# Patient Record
Sex: MALE | Race: WHITE | NOT HISPANIC OR LATINO | Employment: OTHER | URBAN - METROPOLITAN AREA
[De-identification: names, ages, dates, MRNs, and addresses within clinical notes are randomized per-mention and may not be internally consistent; named-entity substitution may affect disease eponyms.]

---

## 2017-05-01 ENCOUNTER — OFFICE VISIT (OUTPATIENT)
Dept: OPHTHALMOLOGY | Facility: CLINIC | Age: 82
End: 2017-05-01
Payer: MEDICARE

## 2017-05-01 DIAGNOSIS — H52.7 REFRACTIVE ERROR: ICD-10-CM

## 2017-05-01 DIAGNOSIS — Z96.1 PSEUDOPHAKIA: ICD-10-CM

## 2017-05-01 DIAGNOSIS — H43.23 ASTEROID HYALOSIS, BILATERAL: ICD-10-CM

## 2017-05-01 DIAGNOSIS — H35.30 AMD (AGE-RELATED MACULAR DEGENERATION), BILATERAL: Primary | ICD-10-CM

## 2017-05-01 DIAGNOSIS — I10 ESSENTIAL HYPERTENSION: ICD-10-CM

## 2017-05-01 PROCEDURE — 99999 PR PBB SHADOW E&M-EST. PATIENT-LVL II: CPT | Mod: PBBFAC,,, | Performed by: OPHTHALMOLOGY

## 2017-05-01 PROCEDURE — 92014 COMPRE OPH EXAM EST PT 1/>: CPT | Mod: S$GLB,,, | Performed by: OPHTHALMOLOGY

## 2017-05-01 NOTE — PROGRESS NOTES
Subjective:       Patient ID: Tremayne Fortune is a 82 y.o. male.    Chief Complaint: Follow-up    HPI  Review of Systems    Objective:      Physical Exam    Assessment:       1. AMD (age-related macular degeneration), bilateral    2. Asteroid hyalosis, bilateral    3. Essential hypertension    4. Refractive error    5. Pseudophakia        Plan:       Dry AMD OU-Stable.  Asteroid hyalosis OU-Stable.  HTN-No retinopathy OU.  RE-Pt does not want MRx. Doing well with readers.        AREDS/AG.  Control HTN.  Readers.  RTC 1 yr.

## 2017-05-01 NOTE — MR AVS SNAPSHOT
[facility]  [address]  [place] [address]  Phone: [phone]  Fax: [phone]                  Tremayne Fortune   2017 11:00 AM   Office Visit    Description:  Male : 1935   Provider:  Nato Delgado MD   Department:  [facility]           Reason for Visit     Follow-up           Diagnoses this Visit        Comments    AMD (age-related macular degeneration), bilateral    -  Primary     Asteroid hyalosis, bilateral         Essential hypertension         Refractive error         Pseudophakia                To Do List           Goals (5 Years of Data)     None      Follow-Up and Disposition     Return in about 1 year (around 2018) for 1 yr F/U..      [facility]     [facility] Care Line -  Assistance  Unless otherwise directed by your provider, please contact [facility], our nurse care line that is available for  assistance.     Registered nurses in the [facility] provide: appointment scheduling, clinical advisement, health education, and other advisory services.  Call: [phone] (toll free)               Medications           Message regarding Medications     Verify the changes and/or additions to your medication regime listed below are the same as discussed with your clinician today.  If any of these changes or additions are incorrect, please notify your healthcare provider.             Verify that the below list of medications is an accurate representation of the medications you are currently taking.  If none reported, the list may be blank. If incorrect, please contact your healthcare provider. Carry this list with you in case of emergency.           Current Medications     colestipol (COLESTID) 1 gram Tab     lisinopril (PRINIVIL,ZESTRIL) 20 MG tablet     metoprolol succinate (TOPROL-XL) 50 MG 24 hr tablet     rosuvastatin (CRESTOR) 10 MG tablet Take 10 mg by mouth once daily.    isosorbide mononitrate  (IMDUR) 30 MG 24 hr tablet            Clinical Reference Information           Allergies as of 5/1/2017     Shellfish Containing Products      Immunizations Administered on Date of Encounter - 5/1/2017     None      [facility] Sign-Up     Activating your [facility] account is as easy as 1-2-3!     1) Visit [email or web address], select Sign Up Now, enter this activation code and your date of birth, then select Next.  [email or web address]  Expires: 6/15/2017  4:40 PM      2) Create a username and password to use when you visit [facility] in the future and select a security question in case you lose your password and select Next.    3) Enter your e-mail address and click Sign Up!    Additional Information  If you have questions, please e-mail [email or web address] or call [phone] to talk to our [facility] staff. Remember, [facility] is NOT to be used for urgent needs. For medical emergencies, dial 911.         Language Assistance Services     ATTENTION: Language assistance services are available, free of charge. Please call [phone].      ATENCIÓN: Si habla español, tiene a hogue disposición servicios gratuitos de asistencia lingüística. Llame al [phone].     CHÚ Ý: N?u b?n nói Ti?ng Vi?t, có các d?ch v? h? tr? ngôn ng? mi?n phí dành cho b?n. G?i s? [phone].         [facility] complies with applicable Federal civil rights laws and does not discriminate on the basis of race, color, national origin, age, disability, or sex.

## 2018-05-10 ENCOUNTER — OFFICE VISIT (OUTPATIENT)
Dept: OPHTHALMOLOGY | Facility: CLINIC | Age: 83
End: 2018-05-10
Payer: MEDICARE

## 2018-05-10 DIAGNOSIS — H43.23 ASTEROID HYALOSIS, BILATERAL: ICD-10-CM

## 2018-05-10 DIAGNOSIS — H26.491 PCO (POSTERIOR CAPSULAR OPACIFICATION), RIGHT: Primary | ICD-10-CM

## 2018-05-10 DIAGNOSIS — H52.7 REFRACTIVE ERROR: ICD-10-CM

## 2018-05-10 DIAGNOSIS — I10 ESSENTIAL HYPERTENSION: ICD-10-CM

## 2018-05-10 DIAGNOSIS — Z96.1 PSEUDOPHAKIA: ICD-10-CM

## 2018-05-10 DIAGNOSIS — H35.30 AMD (AGE-RELATED MACULAR DEGENERATION), BILATERAL: ICD-10-CM

## 2018-05-10 PROCEDURE — 92014 COMPRE OPH EXAM EST PT 1/>: CPT | Mod: S$GLB,,, | Performed by: OPHTHALMOLOGY

## 2018-05-10 PROCEDURE — 99999 PR PBB SHADOW E&M-EST. PATIENT-LVL II: CPT | Mod: PBBFAC,,, | Performed by: OPHTHALMOLOGY

## 2018-05-10 RX ORDER — AMLODIPINE BESYLATE 5 MG/1
5 TABLET ORAL NIGHTLY
COMMUNITY

## 2018-05-10 RX ORDER — ASPIRIN 325 MG
325 TABLET, DELAYED RELEASE (ENTERIC COATED) ORAL DAILY
COMMUNITY

## 2018-05-10 RX ORDER — AMOXICILLIN 500 MG
1 CAPSULE ORAL 2 TIMES DAILY
COMMUNITY

## 2018-05-10 RX ORDER — ROSUVASTATIN CALCIUM 5 MG/1
5 TABLET, COATED ORAL DAILY
Refills: 3 | COMMUNITY
Start: 2018-03-22 | End: 2018-12-01

## 2018-05-10 RX ORDER — LOSARTAN POTASSIUM 50 MG/1
50 TABLET ORAL DAILY
Refills: 1 | COMMUNITY
Start: 2018-02-26

## 2018-05-10 NOTE — PROGRESS NOTES
Subjective:       Patient ID: Tremayne Fortune is a 83 y.o. male.    Chief Complaint: Macular Degeneration (AMD, bilateral )    HPI     Macular Degeneration    Additional comments: AMD, bilateral            Comments   AMD, bilateral   Denies eye pain and flashes. Increase floaters left eye more than right   eye.   No itching, burning or tearing.   Crusting in the corner of eyes left eye more than right eye throughout the   day. Notice a few months, pt states that he no longer have this problem.   Pt states that vision is getting a little bit worst.   Do have trouble with glare.     Meds: No gtt        Last edited by BERNA Espinoza on 5/10/2018 10:59 AM. (History)             Assessment:       1. PCO (posterior capsular opacification), right    2. AMD (age-related macular degeneration), bilateral    3. Asteroid hyalosis, bilateral    4. Essential hypertension    5. Refractive error    6. Pseudophakia        Plan:       Early PCO OD- Not Visually Significant.  Dry AMD OU-Stable.  Asteroid hyalosis OU-Stable.  HTN-No retinopathy OU.  RE-Pt wants CRx.        AREDS/AG.  Control HTN.  Give CRx.  RTC 1 yr.

## 2018-12-01 ENCOUNTER — OFFICE VISIT (OUTPATIENT)
Dept: URGENT CARE | Facility: CLINIC | Age: 83
End: 2018-12-01
Payer: MEDICARE

## 2018-12-01 VITALS
DIASTOLIC BLOOD PRESSURE: 64 MMHG | WEIGHT: 150 LBS | TEMPERATURE: 101 F | SYSTOLIC BLOOD PRESSURE: 126 MMHG | HEART RATE: 87 BPM | BODY MASS INDEX: 23.85 KG/M2 | OXYGEN SATURATION: 98 %

## 2018-12-01 DIAGNOSIS — J00 ACUTE NASOPHARYNGITIS: ICD-10-CM

## 2018-12-01 DIAGNOSIS — R50.9 FEVER, UNSPECIFIED FEVER CAUSE: Primary | ICD-10-CM

## 2018-12-01 LAB
CTP QC/QA: YES
FLUAV AG NPH QL: NEGATIVE
FLUBV AG NPH QL: NEGATIVE

## 2018-12-01 PROCEDURE — 96372 THER/PROPH/DIAG INJ SC/IM: CPT | Mod: S$GLB,,, | Performed by: FAMILY MEDICINE

## 2018-12-01 PROCEDURE — 1101F PT FALLS ASSESS-DOCD LE1/YR: CPT | Mod: CPTII,S$GLB,, | Performed by: FAMILY MEDICINE

## 2018-12-01 PROCEDURE — 99214 OFFICE O/P EST MOD 30 MIN: CPT | Mod: 25,S$GLB,, | Performed by: FAMILY MEDICINE

## 2018-12-01 PROCEDURE — 3074F SYST BP LT 130 MM HG: CPT | Mod: CPTII,S$GLB,, | Performed by: FAMILY MEDICINE

## 2018-12-01 PROCEDURE — 87804 INFLUENZA ASSAY W/OPTIC: CPT | Mod: 59,QW,S$GLB, | Performed by: FAMILY MEDICINE

## 2018-12-01 PROCEDURE — 3078F DIAST BP <80 MM HG: CPT | Mod: CPTII,S$GLB,, | Performed by: FAMILY MEDICINE

## 2018-12-01 RX ORDER — BETAMETHASONE SODIUM PHOSPHATE AND BETAMETHASONE ACETATE 3; 3 MG/ML; MG/ML
6 INJECTION, SUSPENSION INTRA-ARTICULAR; INTRALESIONAL; INTRAMUSCULAR; SOFT TISSUE
Status: COMPLETED | OUTPATIENT
Start: 2018-12-01 | End: 2018-12-01

## 2018-12-01 RX ORDER — GUAIFENESIN/DEXTROMETHORPHAN 100-10MG/5
5 SYRUP ORAL NIGHTLY
Refills: 0 | COMMUNITY
Start: 2018-12-01 | End: 2018-12-11

## 2018-12-01 RX ADMIN — BETAMETHASONE SODIUM PHOSPHATE AND BETAMETHASONE ACETATE 6 MG: 3; 3 INJECTION, SUSPENSION INTRA-ARTICULAR; INTRALESIONAL; INTRAMUSCULAR; SOFT TISSUE at 04:12

## 2018-12-01 NOTE — PATIENT INSTRUCTIONS

## 2018-12-01 NOTE — PROGRESS NOTES
Subjective:       Patient ID: Tremayne Fortune is a 83 y.o. male.    Vitals:  weight is 68 kg (150 lb). His temperature is 100.8 °F (38.2 °C) (abnormal). His blood pressure is 126/64 and his pulse is 87. His oxygen saturation is 98%.     Chief Complaint: URI    URI    This is a new problem. The current episode started in the past 7 days. The problem has been gradually worsening. The maximum temperature recorded prior to his arrival was 100.4 - 100.9 F. The fever has been present for less than 1 day. Associated symptoms include congestion, coughing, rhinorrhea and a sore throat. Pertinent negatives include no ear pain, nausea, rash, sinus pain, vomiting or wheezing. Treatments tried: otc cough.     Just a couple of days of URI sx. Some cough but mostly nonproductive.  Nasal d/c is clear.      Constitution: Positive for fever. Negative for chills, sweating and fatigue.   HENT: Positive for congestion, sore throat and voice change. Negative for ear pain, sinus pain and sinus pressure.    Neck: Negative for painful lymph nodes.   Eyes: Negative for eye redness.   Respiratory: Positive for cough. Negative for chest tightness, sputum production, bloody sputum, COPD, shortness of breath, stridor, wheezing and asthma.    Gastrointestinal: Negative for nausea and vomiting.   Musculoskeletal: Negative for muscle ache.   Skin: Negative for rash.   Allergic/Immunologic: Negative for seasonal allergies and asthma.   Hematologic/Lymphatic: Negative for swollen lymph nodes.       Objective:      Physical Exam   Constitutional: He appears well-developed and well-nourished. No distress.   HENT:   Head: Normocephalic.   Right Ear: External ear normal.   Left Ear: External ear normal.   Nose: Nose normal.   Mouth/Throat: Oropharynx is clear and moist. No oropharyngeal exudate.   Eyes: Conjunctivae are normal.   Neck: No thyromegaly present.   Cardiovascular: Normal rate, regular rhythm and normal heart sounds.   Pulmonary/Chest: Effort  normal and breath sounds normal. No respiratory distress. He has no wheezes.   Lymphadenopathy:     He has no cervical adenopathy.   Neurological: He is alert. No cranial nerve deficit.   Skin: He is not diaphoretic.   Psychiatric: He has a normal mood and affect. His behavior is normal.       Assessment:       1. Fever, unspecified fever cause    2. Acute nasopharyngitis        Plan:         Fever, unspecified fever cause  -     POCT Influenza A/B    Acute nasopharyngitis  -     betamethasone acetate-betamethasone sodium phosphate injection 6 mg

## 2019-08-05 ENCOUNTER — OFFICE VISIT (OUTPATIENT)
Dept: OPHTHALMOLOGY | Facility: CLINIC | Age: 84
End: 2019-08-05
Payer: MEDICARE

## 2019-08-05 DIAGNOSIS — H35.3131 EARLY DRY STAGE NONEXUDATIVE AGE-RELATED MACULAR DEGENERATION OF BOTH EYES: ICD-10-CM

## 2019-08-05 DIAGNOSIS — Z96.1 PSEUDOPHAKIA: ICD-10-CM

## 2019-08-05 DIAGNOSIS — H26.491 PCO (POSTERIOR CAPSULAR OPACIFICATION), RIGHT: Primary | ICD-10-CM

## 2019-08-05 DIAGNOSIS — H52.7 REFRACTIVE ERROR: ICD-10-CM

## 2019-08-05 DIAGNOSIS — I10 ESSENTIAL HYPERTENSION: ICD-10-CM

## 2019-08-05 DIAGNOSIS — H43.23 ASTEROID HYALOSIS, BILATERAL: ICD-10-CM

## 2019-08-05 PROCEDURE — 92014 PR EYE EXAM, EST PATIENT,COMPREHESV: ICD-10-PCS | Mod: S$GLB,,, | Performed by: OPHTHALMOLOGY

## 2019-08-05 PROCEDURE — 99999 PR PBB SHADOW E&M-EST. PATIENT-LVL II: CPT | Mod: PBBFAC,,, | Performed by: OPHTHALMOLOGY

## 2019-08-05 PROCEDURE — 99499 UNLISTED E&M SERVICE: CPT | Mod: S$GLB,,, | Performed by: OPHTHALMOLOGY

## 2019-08-05 PROCEDURE — 99499 RISK ADDL DX/OHS AUDIT: ICD-10-PCS | Mod: S$GLB,,, | Performed by: OPHTHALMOLOGY

## 2019-08-05 PROCEDURE — 99999 PR PBB SHADOW E&M-EST. PATIENT-LVL II: ICD-10-PCS | Mod: PBBFAC,,, | Performed by: OPHTHALMOLOGY

## 2019-08-05 PROCEDURE — 92014 COMPRE OPH EXAM EST PT 1/>: CPT | Mod: S$GLB,,, | Performed by: OPHTHALMOLOGY

## 2019-08-05 NOTE — PROGRESS NOTES
Subjective:       Patient ID: Tremayne Fortune is a 84 y.o. male.    Chief Complaint: Eye Exam    HPI     DSL- 5/10/18     85 y/o male is here for routine eye exam. Pt states no Va change. Pt   denies eye allergies. Occas floaters. Pt wear only OTC.     Eyemeds  At's OU PRN    Last edited by Barbara Roman on 8/5/2019  2:58 PM. (History)             Assessment:       1. PCO (posterior capsular opacification), right    2. Early dry stage nonexudative age-related macular degeneration of both eyes    3. Asteroid hyalosis, bilateral    4. Essential hypertension    5. Refractive error    6. Pseudophakia        Plan:       Early PCO OD- Not Visually Significant.  Dry AMD OU-Needs AREDS/AG.  Asteroid hyalosis OU-Stable.  HTN-No retinopathy OU.  RE-Pt wants MRx for distance only.          AREDS/AG.  Control HTN.  Give MRx for distance.  RTC 1 yr.

## 2020-11-19 ENCOUNTER — TELEPHONE (OUTPATIENT)
Dept: OPHTHALMOLOGY | Facility: CLINIC | Age: 85
End: 2020-11-19

## 2020-11-19 NOTE — TELEPHONE ENCOUNTER
----- Message from Rashmi Brown sent at 11/19/2020 10:43 AM CST -----  Contact: Tremayne  @ [phone]  Pt needs to be seen sooner than January because he is having floaters in the left eye.

## 2020-11-19 NOTE — TELEPHONE ENCOUNTER
Left message stating I was returning phone call. If pt call back, appt has been schedule already on Epic.

## 2020-12-07 ENCOUNTER — OFFICE VISIT (OUTPATIENT)
Dept: OPHTHALMOLOGY | Facility: CLINIC | Age: 85
End: 2020-12-07
Payer: MEDICARE

## 2020-12-07 DIAGNOSIS — H35.3221 EXUDATIVE AGE-RELATED MACULAR DEGENERATION OF LEFT EYE WITH ACTIVE CHOROIDAL NEOVASCULARIZATION: Primary | ICD-10-CM

## 2020-12-07 DIAGNOSIS — H35.3111 EARLY DRY STAGE NONEXUDATIVE AGE-RELATED MACULAR DEGENERATION OF RIGHT EYE: ICD-10-CM

## 2020-12-07 DIAGNOSIS — Z96.1 PSEUDOPHAKIA: ICD-10-CM

## 2020-12-07 DIAGNOSIS — H26.491 PCO (POSTERIOR CAPSULAR OPACIFICATION), RIGHT: ICD-10-CM

## 2020-12-07 DIAGNOSIS — H52.7 REFRACTIVE ERROR: ICD-10-CM

## 2020-12-07 DIAGNOSIS — H43.23 ASTEROID HYALOSIS, BILATERAL: ICD-10-CM

## 2020-12-07 PROCEDURE — 99499 UNLISTED E&M SERVICE: CPT | Mod: S$GLB,,, | Performed by: OPHTHALMOLOGY

## 2020-12-07 PROCEDURE — 99499 RISK ADDL DX/OHS AUDIT: ICD-10-PCS | Mod: S$GLB,,, | Performed by: OPHTHALMOLOGY

## 2020-12-07 PROCEDURE — 1126F PR PAIN SEVERITY QUANTIFIED, NO PAIN PRESENT: ICD-10-PCS | Mod: S$GLB,,, | Performed by: OPHTHALMOLOGY

## 2020-12-07 PROCEDURE — 1126F AMNT PAIN NOTED NONE PRSNT: CPT | Mod: S$GLB,,, | Performed by: OPHTHALMOLOGY

## 2020-12-07 PROCEDURE — 99999 PR PBB SHADOW E&M-EST. PATIENT-LVL II: CPT | Mod: PBBFAC,,, | Performed by: OPHTHALMOLOGY

## 2020-12-07 PROCEDURE — 3288F PR FALLS RISK ASSESSMENT DOCUMENTED: ICD-10-PCS | Mod: CPTII,S$GLB,, | Performed by: OPHTHALMOLOGY

## 2020-12-07 PROCEDURE — 3288F FALL RISK ASSESSMENT DOCD: CPT | Mod: CPTII,S$GLB,, | Performed by: OPHTHALMOLOGY

## 2020-12-07 PROCEDURE — 1101F PR PT FALLS ASSESS DOC 0-1 FALLS W/OUT INJ PAST YR: ICD-10-PCS | Mod: CPTII,S$GLB,, | Performed by: OPHTHALMOLOGY

## 2020-12-07 PROCEDURE — 1101F PT FALLS ASSESS-DOCD LE1/YR: CPT | Mod: CPTII,S$GLB,, | Performed by: OPHTHALMOLOGY

## 2020-12-07 PROCEDURE — 92014 PR EYE EXAM, EST PATIENT,COMPREHESV: ICD-10-PCS | Mod: S$GLB,,, | Performed by: OPHTHALMOLOGY

## 2020-12-07 PROCEDURE — 99999 PR PBB SHADOW E&M-EST. PATIENT-LVL II: ICD-10-PCS | Mod: PBBFAC,,, | Performed by: OPHTHALMOLOGY

## 2020-12-07 PROCEDURE — 92014 COMPRE OPH EXAM EST PT 1/>: CPT | Mod: S$GLB,,, | Performed by: OPHTHALMOLOGY

## 2020-12-07 NOTE — PROGRESS NOTES
Subjective:       Patient ID: Tremayne Fortune is a 85 y.o. male.    Chief Complaint: Eye Exam    HPI     DSL_ 8/5/2019     86 y/o male is here for routine eye exam. H/o of PCO, right. Pt report   onset of vision lost and floaters of the LT eye X 3 weeks ago. Pt denies   any new medication, flashes of light, pain, and eye allergies.     Eyemed  No gtts      Last edited by Barbara Roman on 12/7/2020  8:31 AM. (History)             Assessment:       1. Exudative age-related macular degeneration of left eye with active choroidal neovascularization    2. Early dry stage nonexudative age-related macular degeneration of right eye    3. PCO (posterior capsular opacification), right    4. Asteroid hyalosis, bilateral    5. Refractive error    6. Pseudophakia        Plan:       Wet AMD OS with macular hem-Needs Retina eval/tx. Pt with 3 wk h/o decline in Va.  Dry AMD OD-Stable.     Early PCO OU- Not Visually Significant.  Asteroih hyalosis OU-Stable.  RE      Refer to Dr Shultz.  AREDS/AG.  RTC me in 1 yr.

## 2020-12-09 ENCOUNTER — TELEPHONE (OUTPATIENT)
Dept: FAMILY MEDICINE | Facility: CLINIC | Age: 85
End: 2020-12-09

## 2020-12-09 NOTE — TELEPHONE ENCOUNTER
----- Message from EtienneJones Foy sent at 12/9/2020 11:52 AM CST -----  Regarding: Orders  Contact: Patient  Type: Patient Call Back    Who called: PATIENT    What is the request in detail: He is requesting medical record request sent to retired provider office and labs before appt. Please advise.    [phone]    Can the clinic reply by [facility]? No    Would the patient rather a call back or a response via [facility]? Call    Best call back number: [phone]    Additional Information:    Thanks

## 2020-12-09 NOTE — TELEPHONE ENCOUNTER
I spoke to the pt and advised we will need him to complete the TY form when he comes in and then we can request them.

## 2020-12-14 ENCOUNTER — TELEPHONE (OUTPATIENT)
Dept: OPHTHALMOLOGY | Facility: CLINIC | Age: 85
End: 2020-12-14

## 2020-12-14 ENCOUNTER — LAB VISIT (OUTPATIENT)
Dept: FAMILY MEDICINE | Facility: CLINIC | Age: 85
End: 2020-12-14
Attending: OPHTHALMOLOGY
Payer: MEDICARE

## 2020-12-14 ENCOUNTER — OFFICE VISIT (OUTPATIENT)
Dept: OPHTHALMOLOGY | Facility: CLINIC | Age: 85
End: 2020-12-14
Attending: OPHTHALMOLOGY
Payer: MEDICARE

## 2020-12-14 DIAGNOSIS — H35.3112 NONEXUDATIVE AGE-RELATED MACULAR DEGENERATION, RIGHT EYE, INTERMEDIATE DRY STAGE: ICD-10-CM

## 2020-12-14 DIAGNOSIS — H35.62 MACULAR SUBRETINAL HEMORRHAGE, LEFT: ICD-10-CM

## 2020-12-14 DIAGNOSIS — H43.23 ASTEROID HYALOSIS, BILATERAL: ICD-10-CM

## 2020-12-14 DIAGNOSIS — H35.3221 EXUDATIVE AGE-RELATED MACULAR DEGENERATION, LEFT EYE, WITH ACTIVE CHOROIDAL NEOVASCULARIZATION: ICD-10-CM

## 2020-12-14 DIAGNOSIS — H35.62 MACULAR SUBRETINAL HEMORRHAGE, LEFT: Primary | ICD-10-CM

## 2020-12-14 LAB — SARS-COV-2 RNA RESP QL NAA+PROBE: NOT DETECTED

## 2020-12-14 PROCEDURE — 99999 PR PBB SHADOW E&M-EST. PATIENT-LVL III: ICD-10-PCS | Mod: PBBFAC,,, | Performed by: OPHTHALMOLOGY

## 2020-12-14 PROCEDURE — 92014 COMPRE OPH EXAM EST PT 1/>: CPT | Mod: S$GLB,,, | Performed by: OPHTHALMOLOGY

## 2020-12-14 PROCEDURE — 92202 OPSCPY EXTND ON/MAC DRAW: CPT | Mod: S$GLB,,, | Performed by: OPHTHALMOLOGY

## 2020-12-14 PROCEDURE — 99999 PR PBB SHADOW E&M-EST. PATIENT-LVL III: CPT | Mod: PBBFAC,,, | Performed by: OPHTHALMOLOGY

## 2020-12-14 PROCEDURE — U0003 INFECTIOUS AGENT DETECTION BY NUCLEIC ACID (DNA OR RNA); SEVERE ACUTE RESPIRATORY SYNDROME CORONAVIRUS 2 (SARS-COV-2) (CORONAVIRUS DISEASE [COVID-19]), AMPLIFIED PROBE TECHNIQUE, MAKING USE OF HIGH THROUGHPUT TECHNOLOGIES AS DESCRIBED BY CMS-2020-01-R: HCPCS

## 2020-12-14 PROCEDURE — 1126F PR PAIN SEVERITY QUANTIFIED, NO PAIN PRESENT: ICD-10-PCS | Mod: S$GLB,,, | Performed by: OPHTHALMOLOGY

## 2020-12-14 PROCEDURE — 92134 POSTERIOR SEGMENT OCT RETINA (OCULAR COHERENCE TOMOGRAPHY)-BOTH EYES: ICD-10-PCS | Mod: S$GLB,,, | Performed by: OPHTHALMOLOGY

## 2020-12-14 PROCEDURE — 1101F PR PT FALLS ASSESS DOC 0-1 FALLS W/OUT INJ PAST YR: ICD-10-PCS | Mod: CPTII,S$GLB,, | Performed by: OPHTHALMOLOGY

## 2020-12-14 PROCEDURE — 1101F PT FALLS ASSESS-DOCD LE1/YR: CPT | Mod: CPTII,S$GLB,, | Performed by: OPHTHALMOLOGY

## 2020-12-14 PROCEDURE — 3288F FALL RISK ASSESSMENT DOCD: CPT | Mod: CPTII,S$GLB,, | Performed by: OPHTHALMOLOGY

## 2020-12-14 PROCEDURE — 92202 PR OPHTHALMOSCOPY, EXT, W/DRAW OPTIC NERVE/MACULA, I&R, UNI/BI: ICD-10-PCS | Mod: S$GLB,,, | Performed by: OPHTHALMOLOGY

## 2020-12-14 PROCEDURE — 3288F PR FALLS RISK ASSESSMENT DOCUMENTED: ICD-10-PCS | Mod: CPTII,S$GLB,, | Performed by: OPHTHALMOLOGY

## 2020-12-14 PROCEDURE — 92134 CPTRZ OPH DX IMG PST SGM RTA: CPT | Mod: S$GLB,,, | Performed by: OPHTHALMOLOGY

## 2020-12-14 PROCEDURE — 92014 PR EYE EXAM, EST PATIENT,COMPREHESV: ICD-10-PCS | Mod: S$GLB,,, | Performed by: OPHTHALMOLOGY

## 2020-12-14 PROCEDURE — 1126F AMNT PAIN NOTED NONE PRSNT: CPT | Mod: S$GLB,,, | Performed by: OPHTHALMOLOGY

## 2020-12-14 RX ORDER — ROSUVASTATIN CALCIUM 5 MG/1
5 TABLET, COATED ORAL NIGHTLY
COMMUNITY
Start: 2020-11-30

## 2020-12-14 RX ORDER — OMEGA-3 FATTY ACIDS 1000 MG
2 CAPSULE ORAL 2 TIMES DAILY
COMMUNITY
End: 2021-02-22 | Stop reason: CLARIF

## 2020-12-14 RX ORDER — ISOSORBIDE MONONITRATE 30 MG/1
30 TABLET, EXTENDED RELEASE ORAL NIGHTLY
COMMUNITY
Start: 2020-09-10 | End: 2021-02-22 | Stop reason: CLARIF

## 2020-12-14 RX ORDER — ROSUVASTATIN CALCIUM 5 MG/1
TABLET, COATED ORAL
COMMUNITY
Start: 2020-11-30 | End: 2021-02-22 | Stop reason: CLARIF

## 2020-12-14 RX ORDER — ISOSORBIDE MONONITRATE 30 MG/1
30 TABLET, EXTENDED RELEASE ORAL DAILY
COMMUNITY
Start: 2020-09-29

## 2020-12-14 NOTE — TELEPHONE ENCOUNTER
12/14/2020  Amisha has spoken w/ pt and Mrs Curtis (Sx Nurse regarding change of arrival time due to a  cancellation of on schedule and pt new arrival time. Pt was informed of new arrival time and pre-op call has been completed w/ all information. stj 3:20

## 2020-12-14 NOTE — H&P
Pre-Operative History & Physical  Ophthalmology      SUBJECTIVE:     History of Present Illness:  Patient is a 85 y.o. 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    MEDICATIONS:   No medications prior to admission.       ALLERGIES:   Review of patient's allergies indicates:   Allergen Reactions    Shellfish containing products Hives     Nausea and vomiting  Nausea and vomiting       PAST MEDICAL HISTORY:   Past Medical History:   Diagnosis Date    Cataract     Colon cancer     Hypertension     Macular degeneration      PAST SURGICAL HISTORY:   Past Surgical History:   Procedure Laterality Date    CARDIAC SURGERY      4 vessel CABG    CATARACT EXTRACTION W/  INTRAOCULAR LENS IMPLANT Right 2016    Dr. Delgado    CATARACT EXTRACTION W/  INTRAOCULAR LENS IMPLANT Left 2016    Dr. Delgado     CHOLECYSTECTOMY      COLON SURGERY      colon resection     PAST FAMILY HISTORY:   Family History   Problem Relation Age of Onset    No Known Problems Mother     No Known Problems Father     No Known Problems Sister     No Known Problems Brother     No Known Problems Maternal Aunt     No Known Problems Maternal Uncle     No Known Problems Paternal Aunt     No Known Problems Paternal Uncle     No Known Problems Maternal Grandmother     No Known Problems Maternal Grandfather     No Known Problems Paternal Grandmother     No Known Problems Paternal Grandfather     Amblyopia Neg Hx     Blindness Neg Hx     Cancer Neg Hx     Cataracts Neg Hx     Diabetes Neg Hx     Glaucoma Neg Hx     Hypertension Neg Hx     Macular degeneration Neg Hx     Retinal detachment Neg Hx     Strabismus Neg Hx     Stroke Neg Hx     Thyroid disease Neg Hx      SOCIAL HISTORY:   Social History     Tobacco Use    Smoking status: Former Smoker     Quit date: 1996     Years since quittin.4    Smokeless tobacco: Never Used   Substance Use Topics    Alcohol use: No    Drug use: No         MENTAL STATUS: Alert    REVIEW OF SYSTEMS: Negative,except per HPI    OBJECTIVE:     COVID-19 Screening Test:  Test order  Results pending  ASSESSMENT/PLAN:     Patient is a 85 y.o. 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     - Plan for surgical correction 25g PPV/subretinal tPA/Avastin, left eye, under local/MAC     - Risks/benefits/alternatives of the procedure including, but not limited to scarring, bleeding, infection, loss or decreased vision, and/or need for possible repeat surgery discussed with the patient and family.   - Informed consent obtained prior to surgery and the patient/family voiced good understanding.

## 2020-12-15 ENCOUNTER — HOSPITAL ENCOUNTER (OUTPATIENT)
Facility: HOSPITAL | Age: 85
Discharge: HOME | End: 2020-12-15
Attending: OPHTHALMOLOGY | Admitting: OPHTHALMOLOGY
Payer: MEDICARE

## 2020-12-15 ENCOUNTER — ANESTHESIA EVENT (OUTPATIENT)
Dept: SURGERY | Facility: HOSPITAL | Age: 85
End: 2020-12-15
Payer: MEDICARE

## 2020-12-15 ENCOUNTER — ANESTHESIA (OUTPATIENT)
Dept: SURGERY | Facility: HOSPITAL | Age: 85
End: 2020-12-15
Payer: MEDICARE

## 2020-12-15 VITALS
SYSTOLIC BLOOD PRESSURE: 173 MMHG | HEART RATE: 59 BPM | BODY MASS INDEX: 24.91 KG/M2 | WEIGHT: 155 LBS | HEIGHT: 66 IN | RESPIRATION RATE: 16 BRPM | DIASTOLIC BLOOD PRESSURE: 78 MMHG | TEMPERATURE: 98 F | OXYGEN SATURATION: 100 %

## 2020-12-15 DIAGNOSIS — H35.62 MACULAR SUBRETINAL HEMORRHAGE OF LEFT EYE: Primary | ICD-10-CM

## 2020-12-15 DIAGNOSIS — H35.62 SUBRETINAL HEMORRHAGE OF LEFT EYE: ICD-10-CM

## 2020-12-15 PROCEDURE — 36000706: Performed by: OPHTHALMOLOGY

## 2020-12-15 PROCEDURE — 25000003 PHARM REV CODE 250: Performed by: OPHTHALMOLOGY

## 2020-12-15 PROCEDURE — 36000707: Performed by: OPHTHALMOLOGY

## 2020-12-15 PROCEDURE — D9220A PRA ANESTHESIA: ICD-10-PCS | Mod: ANES,,, | Performed by: ANESTHESIOLOGY

## 2020-12-15 PROCEDURE — 71000015 HC POSTOP RECOV 1ST HR: Performed by: OPHTHALMOLOGY

## 2020-12-15 PROCEDURE — 37000008 HC ANESTHESIA 1ST 15 MINUTES: Performed by: OPHTHALMOLOGY

## 2020-12-15 PROCEDURE — 63600175 PHARM REV CODE 636 W HCPCS: Performed by: NURSE ANESTHETIST, CERTIFIED REGISTERED

## 2020-12-15 PROCEDURE — 63600175 PHARM REV CODE 636 W HCPCS: Performed by: OPHTHALMOLOGY

## 2020-12-15 PROCEDURE — 37000009 HC ANESTHESIA EA ADD 15 MINS: Performed by: OPHTHALMOLOGY

## 2020-12-15 PROCEDURE — 71000044 HC DOSC ROUTINE RECOVERY FIRST HOUR: Performed by: OPHTHALMOLOGY

## 2020-12-15 PROCEDURE — 67036 PR VITRECTOMY,MECHANICAL: ICD-10-PCS | Mod: LT,,, | Performed by: OPHTHALMOLOGY

## 2020-12-15 PROCEDURE — D9220A PRA ANESTHESIA: Mod: CRNA,,, | Performed by: NURSE ANESTHETIST, CERTIFIED REGISTERED

## 2020-12-15 PROCEDURE — 67036 REMOVAL OF INNER EYE FLUID: CPT | Mod: LT,,, | Performed by: OPHTHALMOLOGY

## 2020-12-15 PROCEDURE — D9220A PRA ANESTHESIA: Mod: ANES,,, | Performed by: ANESTHESIOLOGY

## 2020-12-15 PROCEDURE — 27600004 OPTIME MED/SURG SUP & DEVICES INTRAOCULAR LENS: Performed by: OPHTHALMOLOGY

## 2020-12-15 PROCEDURE — S0020 INJECTION, BUPIVICAINE HYDRO: HCPCS | Performed by: OPHTHALMOLOGY

## 2020-12-15 PROCEDURE — 25000003 PHARM REV CODE 250: Performed by: NURSE ANESTHETIST, CERTIFIED REGISTERED

## 2020-12-15 PROCEDURE — D9220A PRA ANESTHESIA: ICD-10-PCS | Mod: CRNA,,, | Performed by: NURSE ANESTHETIST, CERTIFIED REGISTERED

## 2020-12-15 RX ORDER — MIDAZOLAM HYDROCHLORIDE 1 MG/ML
INJECTION, SOLUTION INTRAMUSCULAR; INTRAVENOUS
Status: DISCONTINUED | OUTPATIENT
Start: 2020-12-15 | End: 2020-12-15

## 2020-12-15 RX ORDER — MOXIFLOXACIN 5 MG/ML
1 SOLUTION/ DROPS OPHTHALMIC
Status: DISPENSED | OUTPATIENT
Start: 2020-12-15

## 2020-12-15 RX ORDER — ACETAMINOPHEN 325 MG/1
325 TABLET ORAL EVERY 6 HOURS PRN
Refills: 0
Start: 2020-12-15

## 2020-12-15 RX ORDER — PHENYLEPHRINE HYDROCHLORIDE 25 MG/ML
1 SOLUTION/ DROPS OPHTHALMIC
Status: DISPENSED | OUTPATIENT
Start: 2020-12-15

## 2020-12-15 RX ORDER — TETRACAINE HYDROCHLORIDE 5 MG/ML
1 SOLUTION OPHTHALMIC
Status: DISPENSED | OUTPATIENT
Start: 2020-12-15

## 2020-12-15 RX ORDER — NEOMYCIN SULFATE, POLYMYXIN B SULFATE, AND DEXAMETHASONE 3.5; 10000; 1 MG/G; [USP'U]/G; MG/G
OINTMENT OPHTHALMIC
Status: DISCONTINUED | OUTPATIENT
Start: 2020-12-15 | End: 2020-12-15 | Stop reason: HOSPADM

## 2020-12-15 RX ORDER — LIDOCAINE HYDROCHLORIDE 20 MG/ML
INJECTION, SOLUTION EPIDURAL; INFILTRATION; INTRACAUDAL; PERINEURAL
Status: DISCONTINUED | OUTPATIENT
Start: 2020-12-15 | End: 2020-12-15

## 2020-12-15 RX ORDER — DEXAMETHASONE SODIUM PHOSPHATE 4 MG/ML
INJECTION, SOLUTION INTRA-ARTICULAR; INTRALESIONAL; INTRAMUSCULAR; INTRAVENOUS; SOFT TISSUE
Status: DISCONTINUED
Start: 2020-12-15 | End: 2020-12-15 | Stop reason: HOSPADM

## 2020-12-15 RX ORDER — ACETAMINOPHEN 325 MG/1
650 TABLET ORAL EVERY 4 HOURS PRN
Status: DISCONTINUED | OUTPATIENT
Start: 2020-12-15 | End: 2020-12-15 | Stop reason: HOSPADM

## 2020-12-15 RX ORDER — PREDNISOLONE ACETATE 10 MG/ML
1 SUSPENSION/ DROPS OPHTHALMIC
Status: DISPENSED | OUTPATIENT
Start: 2020-12-15

## 2020-12-15 RX ORDER — FENTANYL CITRATE 50 UG/ML
INJECTION, SOLUTION INTRAMUSCULAR; INTRAVENOUS
Status: DISCONTINUED | OUTPATIENT
Start: 2020-12-15 | End: 2020-12-15

## 2020-12-15 RX ORDER — SODIUM CHLORIDE 9 MG/ML
INJECTION, SOLUTION INTRAVENOUS CONTINUOUS PRN
Status: DISCONTINUED | OUTPATIENT
Start: 2020-12-15 | End: 2020-12-15

## 2020-12-15 RX ORDER — EPINEPHRINE 1 MG/ML
INJECTION, SOLUTION INTRACARDIAC; INTRAMUSCULAR; INTRAVENOUS; SUBCUTANEOUS
Status: DISCONTINUED
Start: 2020-12-15 | End: 2020-12-15 | Stop reason: HOSPADM

## 2020-12-15 RX ORDER — EPINEPHRINE 1 MG/ML
INJECTION, SOLUTION INTRACARDIAC; INTRAMUSCULAR; INTRAVENOUS; SUBCUTANEOUS
Status: DISCONTINUED | OUTPATIENT
Start: 2020-12-15 | End: 2020-12-15 | Stop reason: HOSPADM

## 2020-12-15 RX ORDER — VANCOMYCIN HYDROCHLORIDE 500 MG/10ML
INJECTION, POWDER, LYOPHILIZED, FOR SOLUTION INTRAVENOUS
Status: DISCONTINUED
Start: 2020-12-15 | End: 2020-12-15 | Stop reason: HOSPADM

## 2020-12-15 RX ORDER — PROPOFOL 10 MG/ML
VIAL (ML) INTRAVENOUS
Status: DISCONTINUED | OUTPATIENT
Start: 2020-12-15 | End: 2020-12-15

## 2020-12-15 RX ORDER — NEOMYCIN SULFATE, POLYMYXIN B SULFATE, AND DEXAMETHASONE 3.5; 10000; 1 MG/G; [USP'U]/G; MG/G
OINTMENT OPHTHALMIC
Status: DISCONTINUED
Start: 2020-12-15 | End: 2020-12-15 | Stop reason: HOSPADM

## 2020-12-15 RX ORDER — VANCOMYCIN HYDROCHLORIDE 500 MG/10ML
INJECTION, POWDER, LYOPHILIZED, FOR SOLUTION INTRAVENOUS
Status: DISCONTINUED | OUTPATIENT
Start: 2020-12-15 | End: 2020-12-15 | Stop reason: HOSPADM

## 2020-12-15 RX ORDER — TROPICAMIDE 10 MG/ML
1 SOLUTION/ DROPS OPHTHALMIC
Status: DISPENSED | OUTPATIENT
Start: 2020-12-15

## 2020-12-15 RX ORDER — LIDOCAINE HYDROCHLORIDE 20 MG/ML
INJECTION, SOLUTION EPIDURAL; INFILTRATION; INTRACAUDAL; PERINEURAL
Status: DISCONTINUED
Start: 2020-12-15 | End: 2020-12-15 | Stop reason: HOSPADM

## 2020-12-15 RX ORDER — LIDOCAINE HYDROCHLORIDE 20 MG/ML
INJECTION, SOLUTION EPIDURAL; INFILTRATION; INTRACAUDAL; PERINEURAL
Status: DISCONTINUED | OUTPATIENT
Start: 2020-12-15 | End: 2020-12-15 | Stop reason: HOSPADM

## 2020-12-15 RX ORDER — NEOMYCIN SULFATE, POLYMYXIN B SULFATE AND DEXAMETHASONE 3.5; 10000; 1 MG/ML; [USP'U]/ML; MG/ML
SUSPENSION/ DROPS OPHTHALMIC
Status: DISCONTINUED
Start: 2020-12-15 | End: 2020-12-15 | Stop reason: WASHOUT

## 2020-12-15 RX ORDER — BUPIVACAINE HYDROCHLORIDE 7.5 MG/ML
INJECTION, SOLUTION EPIDURAL; RETROBULBAR
Status: DISCONTINUED | OUTPATIENT
Start: 2020-12-15 | End: 2020-12-15 | Stop reason: HOSPADM

## 2020-12-15 RX ORDER — HYDROCODONE BITARTRATE AND ACETAMINOPHEN 5; 325 MG/1; MG/1
1 TABLET ORAL EVERY 4 HOURS PRN
Status: DISCONTINUED | OUTPATIENT
Start: 2020-12-15 | End: 2020-12-15 | Stop reason: HOSPADM

## 2020-12-15 RX ORDER — ATROPINE SULFATE 10 MG/ML
1 SOLUTION/ DROPS OPHTHALMIC
Status: DISPENSED | OUTPATIENT
Start: 2020-12-15

## 2020-12-15 RX ORDER — BUPIVACAINE HYDROCHLORIDE 7.5 MG/ML
INJECTION, SOLUTION EPIDURAL; RETROBULBAR
Status: DISCONTINUED
Start: 2020-12-15 | End: 2020-12-15 | Stop reason: HOSPADM

## 2020-12-15 RX ORDER — DEXAMETHASONE SODIUM PHOSPHATE 4 MG/ML
INJECTION, SOLUTION INTRA-ARTICULAR; INTRALESIONAL; INTRAMUSCULAR; INTRAVENOUS; SOFT TISSUE
Status: DISCONTINUED | OUTPATIENT
Start: 2020-12-15 | End: 2020-12-15 | Stop reason: HOSPADM

## 2020-12-15 RX ADMIN — TROPICAMIDE 1 DROP: 10 SOLUTION/ DROPS OPHTHALMIC at 10:12

## 2020-12-15 RX ADMIN — BEVACIZUMAB 1.25 MG: 100 INJECTION, SOLUTION INTRAVENOUS at 01:12

## 2020-12-15 RX ADMIN — SODIUM CHLORIDE: 0.9 INJECTION, SOLUTION INTRAVENOUS at 12:12

## 2020-12-15 RX ADMIN — ATROPINE SULFATE 1 DROP: 10 SOLUTION OPHTHALMIC at 10:12

## 2020-12-15 RX ADMIN — TETRACAINE HYDROCHLORIDE 1 DROP: 5 SOLUTION OPHTHALMIC at 10:12

## 2020-12-15 RX ADMIN — PHENYLEPHRINE HYDROCHLORIDE 1 DROP: 25 SOLUTION/ DROPS OPHTHALMIC at 10:12

## 2020-12-15 RX ADMIN — PREDNISOLONE ACETATE 1 DROP: 10 SUSPENSION OPHTHALMIC at 10:12

## 2020-12-15 RX ADMIN — FENTANYL CITRATE 25 MCG: 50 INJECTION INTRAMUSCULAR; INTRAVENOUS at 12:12

## 2020-12-15 RX ADMIN — PROPOFOL 10 MG: 10 INJECTION, EMULSION INTRAVENOUS at 12:12

## 2020-12-15 RX ADMIN — MOXIFLOXACIN 1 DROP: 5 SOLUTION/ DROPS OPHTHALMIC at 10:12

## 2020-12-15 RX ADMIN — LIDOCAINE HYDROCHLORIDE 60 MG: 20 INJECTION, SOLUTION EPIDURAL; INFILTRATION; INTRACAUDAL at 12:12

## 2020-12-15 RX ADMIN — PROPOFOL 30 MG: 10 INJECTION, EMULSION INTRAVENOUS at 12:12

## 2020-12-15 RX ADMIN — MIDAZOLAM 1 MG: 1 INJECTION INTRAMUSCULAR; INTRAVENOUS at 12:12

## 2020-12-15 NOTE — BRIEF OP NOTE
Date of Procedure: 12/15/2020     Pre-Op Dx: Macular subretinal hemorrhage, left eye    Post Op Dx: Same    Procedure Performed: Pars plana vitrectomy, injection of subretinal tpa, injection of 20% SF6 gas, intravitreal injection of 20% SF6 gas, left eye    Attending Surgeon: CAM Shultz    Assistant Surgeon: CUCA Dillon    Anesthesia: Local/Mac, retrobulbar injection of 50/50 mixture 0.75% bupivicaine, 2% lidocaine    Estimated blood loss: Minimal    Complication: None    Specimen: None    Disposition: Stable to recovery    Findings: None    Outcome: Stable    Date of Discharge: 12/15/2020    Discharge Disposition: Home    F/U: 12/16/20

## 2020-12-15 NOTE — ANESTHESIA POSTPROCEDURE EVALUATION
Anesthesia Post Evaluation    Patient: Tremayne Fortune    Procedure(s) Performed: Procedure(s) (LRB):  VITRECTOMY, PARS PLANA APPROACH, injection of subretinal tpa, injection of gas, left eye (Left)    Final Anesthesia Type: general    Patient location during evaluation: OPS  Patient participation: Yes- Able to Participate  Level of consciousness: awake and alert  Post-procedure vital signs: reviewed and stable  Pain management: adequate  Airway patency: patent    PONV status at discharge: No PONV  Anesthetic complications: no      Cardiovascular status: blood pressure returned to baseline and stable  Respiratory status: unassisted, room air and spontaneous ventilation  Hydration status: euvolemic  Follow-up not needed.          Vitals Value Taken Time   /72 12/15/20 1431   Temp 98.1 12/15/20 1350   Pulse 57 12/15/20 1433   Resp 16 12/15/20 1350   SpO2 100 % 12/15/20 1433   Vitals shown include unvalidated device data.      No case tracking events are documented in the log.      Pain/Justin Score: No data recorded    Patient moving throughout the procedure despite retrobulbar block.  Disinhibited due sedation.  Likely would benefit from LMA for future procedures

## 2020-12-15 NOTE — OP NOTE
Date of Procedure: 12/15/2020   Pre-Op Dx: Macular subretinal hemorrhage, left eye  Post Op Dx: Same  Procedure Performed: Pars plana vitrectomy, injection of subretinal tpa, injection of 20% SF6 gas, intravitreal injection of 20% SF6 gas, left eye  Attending Surgeon: CAM Shultz  Assistant Surgeon: CUCA Dillon  Anesthesia: Local/Mac, retrobulbar injection of 50/50 mixture 0.75% bupivicaine, 2% lidocaine  Estimated blood loss: Minimal  Complication: None  Specimen: None  Disposition: Stable to recovery  Findings: None  Outcome: Stable  Date of Discharge: 12/15/2020  Discharge Disposition: Home  F/U: 12/16/20       Informed consent was obtained and placed in the medical record. The patient was properly identified and taken to the operating room. MAC anesthesia was begun by the anesthesia team. A retrobulbar block consisting of a 50/50 mixture of 2% lidocaine and 0.75% bupivicaine was performed in the left eye without complication. The left eye was prepped and draped in the standard ophthalmic fashion taking care to exclude the lashes from the operative field.    Standard 25 gauge vitrectomy setup was achieved with all ports 4.0 mm posterior to the limbus. The Hepregen visualization system alternating with a high magnification contact lens were used. Core vitrectomy was performed. The hyaloid membrane was elevated using the cutter on suction mode.  The peripheral vitreous was shaved to the vitreous base using scleral depression. 0.4cc of tissue plasminogen activator in a concentration of 12.5mcg/0.1 cc was injected into the large subretinal hemorrhage in the inferotemporal macula.  A bleb was raised which included the entire area of the submacular hemorrhage and extended past the inferotemporal arcade.  The retina was inspected for 360 degrees to the ora harlan using scleral depression. There were no breaks or detachments.  Fluid/air exchange was performed.  The air was exchanged for 20% SF6 gas through the  infusion cannula using a chimney through one of the ports for exhaust. The ports were removed. The infusion cannula was removed.  All wounds were checked and noted not to be leaking. The eye was normotensive. A subconjunctival injection of vancomycin and dexamethasone was placed.     The eye was patched. A Infante shield was placed. The patient was awakened from MAC anesthesia by the anesthesia team having tolerated the procedure well.  The patient was brought to the post operative area with instructions to lie flat until 45 minutes after tpa injection, then to sit upright.

## 2020-12-15 NOTE — PLAN OF CARE
Patient tolerated procedure/anesthesia well, vss, no complications or concerns. Patient complains of mild pain (does not want to take tylenol at this time), patient denies nausea, and tolerates PO intake. Consents with chart. RN reviewed discharge instructions with patient and granddaughter at bedside,verbalized understanding.

## 2020-12-15 NOTE — TRANSFER OF CARE
"Anesthesia Transfer of Care Note    Patient: Tremayne Fortune    Procedure(s) Performed: Procedure(s) (LRB):  VITRECTOMY, PARS PLANA APPROACH, injection of subretinal tpa, injection of gas, left eye (Left)    Patient location: PACU    Anesthesia Type: general    Transport from OR: Transported from OR on room air with adequate spontaneous ventilation    Post pain: adequate analgesia    Post assessment: no apparent anesthetic complications and tolerated procedure well    Post vital signs: stable    Level of consciousness: awake, alert and oriented    Nausea/Vomiting: no nausea/vomiting    Complications: none    Transfer of care protocol was followed      Last vitals:   Visit Vitals  BP (!) 171/79   Pulse (!) 55   Temp 37.1 °C (98.7 °F) (Oral)   Resp 19   Ht 5' 6" (1.676 m)   Wt 70.3 kg (155 lb)   SpO2 97%   BMI 25.02 kg/m²     "
no

## 2020-12-15 NOTE — DISCHARGE INSTRUCTIONS
Face forward positioning all day today.  Try to sleep upright or head at 45 degree angle.  If unable to sleep in this position, then left side down is the next best p  Having a Vitrectomy    A vitrectomy is a type of eye surgery to treat problems with the retina and vitreous. The vitreous is a gel-like substance that fills the middle portion of your eye. During the surgery, your surgeon removes the vitreous and replaces it with another solution.  What to tell your health care provider  Before your surgery, tell your health care provider:  · What medicines you take. This includes over-the-counter medicines such as ibuprofen. It also includes vitamins, herbs, and other supplements. You may need to stop taking some medicines before the procedure, such as blood thinners and aspirin.  · If you smoke. You may need to stop before your surgery. Smoking can delay healing. Talk with your healthcare provider if you need help to stop smoking.  · If youve had recent changes in your health. This includes an infection or fever.  · If you are sensitive or allergic to anything. This includes medicines, latex, tape, and anesthetic medicines.  · If you are pregnant. Also tell your healthcare provider if you think you may be pregnant.  Getting ready for your surgery  Make sure to:  · Ask a family member or friend to take you home from the hospital  · Make plans for some help at home while you recover  · Follow all other instructions from your health care provider  · Read the consent form and ask questions if something is not clear  · Not eat or drink after midnight before your surgery  Tests before your surgery  Before your surgery, your doctor may look at your retina. Special tools are used to shine a light in your eye and look at your retina. You may need to have your eyes dilated for this eye exam. You also may need to have an ultrasound of your eye. This helps your doctor view the retina. An ultrasound uses sound waves to create  images on a computer screen.  On the day of your surgery  Talk with your doctor about what to expect during your surgery. The details of the surgery may differ somewhat. A doctor specially trained in eye surgery (ophthalmologist) will do your operation. In general, you can expect the following:  · You may have general anesthesia. This will cause you to sleep through the surgery. Or you may be awake during the surgery. You will receive a medicine to help you relax. You also may be given anesthetic eye drops and injections. This is to make sure you dont feel anything.  · Your surgeon will make an incision in the outer layer of your eye.  · He or she will make a small cut in the white part of the eye (sclera).  · Your doctor will remove the vitreous and any scar tissue or other material.  · Your doctor will do other repairs to your eye as needed. For example, he or she might use a laser to fix a tear in your retina. In some cases, your doctor may inject a gas bubble into your eye. This is to help keep the retina in place.  · Your doctor will replace the vitreous with another type of fluid. It may be replaced with silicone oil or saline.  · Your doctor will close your incisions with stitches.  · Your doctor will put an antibiotic ointment on your eye to help prevent infection.  · Your eye will be covered with a patch.  After your surgery  Youll likely be able to go home the same day. Have someone drive you home.  Recovering at home  Follow all of your doctors instructions about eye care. Your eye may be a little sore after the procedure. You can take over-the-counter pain medicine as approved by your healthcare provider. You may need to use eye drops with antibiotics. This is to help prevent infection. You may need to wear an eye patch for a day or so.  If you had a gas bubble placed in your eye during your vitrectomy, you will need to follow specific instructions about positioning. You will also need to not travel  on an airplane for a period of time after the surgery. Ask your doctor when it will be safe for you to fly again.  Follow-up care  You will need close follow-up with your doctor to see how well the surgery worked. You may have an appointment the day after the surgery. You may need follow-up surgery in the future to remove the replacement fluid from your eye.  Your vision may not be completely normal after your vitrectomy, especially if you had permanent damage to your retina. Ask your doctor how much improvement you can expect.     When to call your health care provider  Call your health care provider right away if you have any of the below:  · Vision that gets worse  · Pain or swelling around your eye that gets worse   Date Last Reviewed: 6/16/2015  © 3959-6095 [employer] [address] [place] [address], [place] [address]. All rights reserved. This information is not intended as a substitute for professional medical care. Always follow your healthcare professional's instructions.

## 2020-12-15 NOTE — ANESTHESIA PREPROCEDURE EVALUATION
12/15/2020  Tremayne Fortune is a 85 y.o., male.    Pre-op Assessment    I have reviewed the Patient Summary Reports.     I have reviewed the Nursing Notes.    I have reviewed the Medications.     Review of Systems  Anesthesia Hx:  No problems with previous Anesthesia Denies Hx of Anesthetic complications  History of prior surgery of interest to airway management or planning: Denies Family Hx of Anesthesia complications.   Denies Personal Hx of Anesthesia complications.   Social:  Non-Smoker, No Alcohol Use    Hematology/Oncology:  Hematology Normal       -- Cancer in past history:  surgery  Oncology Comments: Colon cancer     EENT/Dental:   Macular subretinal hemorrhage   Cardiovascular:   Exercise tolerance: good Hypertension CAD asymptomatic CABG/stent      12/20  ECHOCARDIOGRAM CONCLUSIONS   *Mildly increased left ventricular septal wall thickness. Normal left ventricular cavity size. Borderline normal left ventricular   systolic function. Apical-septal akinesis; Septal and inferior hypokinesis. Left ventricular ejection fraction is estimated at 50 %.   Grade I diastolic dysfunction.   *Mild LAE.   *Mild aortic valve sclerosis without stenosis; Mild AI.   *Trace to mild MR.   *Mild TR; RVSP 33.6 mmHg.   *Mild to moderate PI.           12/20  CAROTIDS: Conclusions - Abnormal scan as described above. Bilateral Moderate ICA stenosis of 40-59%.    Pulmonary:  Pulmonary Normal    Renal/:  Renal/ Normal     Hepatic/GI:  Hepatic/GI Normal    Musculoskeletal:  Musculoskeletal Normal    Neurological:  Neurology Normal    Endocrine:  Endocrine Normal    Dermatological:  Skin Normal    Psych:  Psychiatric Normal           Physical Exam  General:  Well nourished    Airway/Jaw/Neck:  Airway Findings: Mouth Opening: Normal General Airway Assessment: Adult  Mallampati: II  TM Distance: Normal, at least 6 cm          Dental:  DENTAL FINDINGS: Normal   Chest/Lungs:  Chest/Lungs Clear    Heart/Vascular:  Heart Findings: Normal Heart murmur: negative       Mental Status:  Mental Status Findings:  Cooperative, Alert and Oriented         Anesthesia Plan  Type of Anesthesia, risks & benefits discussed:  Anesthesia Type:  MAC, general  Patient's Preference:   Intra-op Monitoring Plan: standard ASA monitors  Intra-op Monitoring Plan Comments:   Post Op Pain Control Plan: per primary service following discharge from PACU  Post Op Pain Control Plan Comments:   Induction:   IV  Beta Blocker:  Patient is not currently on a Beta-Blocker (No further documentation required).       Informed Consent: Patient understands risks and agrees with Anesthesia plan.  Questions answered. Anesthesia consent signed with patient.  ASA Score: 3     Day of Surgery Review of History & Physical:    H&P update referred to the surgeon.         Ready For Surgery From Anesthesia Perspective.

## 2020-12-15 NOTE — PROGRESS NOTES
Subjective:       Patient ID: Tremayne Fortune is a 85 y.o. male      Chief Complaint   Patient presents with    Macular Degeneration     History of Present Illness  HPI     DSL-12/07/20 referred by   for ARMD - OS retina exam    Pt states that about 3-4 wks ago started having problems with OS, had   floaters in the past but this time big spot, shaped like the number 8 and   black in front of the eye- doesn't move. Can't make out any faces when   closing the OD, but can see in the periphery. No flashes of light, pain or   double vision.  Va normal OD.    Eye med(s) - none     H/o of PCO, right.         Last edited by Alexander Shultz MD on 12/15/2020  5:55 AM. (History)        Imaging:    See report    Assessment/Plan:     1. Macular subretinal hemorrhage, left  Large subfoveal SRH.  Discussed RBA antiVEGF vs PPV/tpa/gas/antiVEGF.  Unclear visual potential with each.  Discussed that SRH is likely already causing some toxicity.  May have better chance at limiting subfoveal scarring with displacement.  Also has small chance of improved vision with inj only.    Pt wishes to proceed with surgery    RBA discussed in detail, inc surgical failure, need for more surgery, loss of vision/eye, no recovery of vision, surgical failure, bleeding, infection, high eye pressure (glaucoma), etc.  Pt wishes to proceed.    - COVID-19 Routine Screening; Future  - Case Request Operating Room: VITRECTOMY, PARS PLANA APPROACH, injection of subretinal tpa, injection of gas, left eye  - Posterior Segment OCT Retina-Both eyes    2. Exudative age-related macular degeneration, left eye, with active choroidal neovascularization  See #1  - Posterior Segment OCT Retina-Both eyes    3. Nonexudative age-related macular degeneration, right eye, intermediate dry stage  Discussed Dry and Wet AMD in detail  Recommend AREDS 2 Vitamins  Home Amsler Grid Testing  RTC immediately PRN any changes in vision    - Posterior Segment OCT  Retina-Both eyes    4. Asteroid hyalosis, bilateral  Having PPV OS.  Not vis sig.    Follow up in about 1 day (around 12/15/2020), or if symptoms worsen or fail to improve, for Surgery.

## 2020-12-15 NOTE — DISCHARGE SUMMARY
[facility]  Discharge Note  Short Stay  Date of Admission: 12/15/2020    Procedure(s) (LRB):  VITRECTOMY, PARS PLANA APPROACH, injection of subretinal tpa, injection of gas, left eye (Left)    OUTCOME: Patient tolerated treatment/procedure well without complication and is now ready for discharge.    DISPOSITION: Home or Self Care    FINAL DIAGNOSIS:  Macular subretinal hemorrhage, left eye    FOLLOWUP: In clinic    DISCHARGE INSTRUCTIONS:    Discharge Procedure Orders   Diet general     Sponge bath only until clinic visit     Lifting restrictions     Call MD for:  temperature >100.4     Call MD for:  persistent nausea and vomiting     Call MD for:  severe uncontrolled pain     Call MD for:  difficulty breathing, headache or visual disturbances     Call MD for:  redness, tenderness, or signs of infection (pain, swelling, redness, odor or green/yellow discharge around incision site)     Call MD for:  hives     Call MD for:  persistent dizziness or light-headedness     Call MD for:  extreme fatigue     Call MD for:     Leave dressing on - Keep it clean, dry, and intact until clinic visit

## 2020-12-16 ENCOUNTER — OFFICE VISIT (OUTPATIENT)
Dept: OPHTHALMOLOGY | Facility: CLINIC | Age: 85
End: 2020-12-16
Payer: MEDICARE

## 2020-12-16 VITALS — DIASTOLIC BLOOD PRESSURE: 67 MMHG | HEART RATE: 64 BPM | SYSTOLIC BLOOD PRESSURE: 144 MMHG

## 2020-12-16 DIAGNOSIS — H35.62 MACULAR SUBRETINAL HEMORRHAGE, LEFT: Primary | ICD-10-CM

## 2020-12-16 PROCEDURE — 1101F PR PT FALLS ASSESS DOC 0-1 FALLS W/OUT INJ PAST YR: ICD-10-PCS | Mod: CPTII,S$GLB,, | Performed by: OPHTHALMOLOGY

## 2020-12-16 PROCEDURE — 99999 PR PBB SHADOW E&M-EST. PATIENT-LVL III: ICD-10-PCS | Mod: PBBFAC,,, | Performed by: OPHTHALMOLOGY

## 2020-12-16 PROCEDURE — 99024 PR POST-OP FOLLOW-UP VISIT: ICD-10-PCS | Mod: S$GLB,,, | Performed by: OPHTHALMOLOGY

## 2020-12-16 PROCEDURE — 3288F FALL RISK ASSESSMENT DOCD: CPT | Mod: CPTII,S$GLB,, | Performed by: OPHTHALMOLOGY

## 2020-12-16 PROCEDURE — 1126F PR PAIN SEVERITY QUANTIFIED, NO PAIN PRESENT: ICD-10-PCS | Mod: S$GLB,,, | Performed by: OPHTHALMOLOGY

## 2020-12-16 PROCEDURE — 3288F PR FALLS RISK ASSESSMENT DOCUMENTED: ICD-10-PCS | Mod: CPTII,S$GLB,, | Performed by: OPHTHALMOLOGY

## 2020-12-16 PROCEDURE — 1101F PT FALLS ASSESS-DOCD LE1/YR: CPT | Mod: CPTII,S$GLB,, | Performed by: OPHTHALMOLOGY

## 2020-12-16 PROCEDURE — 99024 POSTOP FOLLOW-UP VISIT: CPT | Mod: S$GLB,,, | Performed by: OPHTHALMOLOGY

## 2020-12-16 PROCEDURE — 1126F AMNT PAIN NOTED NONE PRSNT: CPT | Mod: S$GLB,,, | Performed by: OPHTHALMOLOGY

## 2020-12-16 PROCEDURE — 99999 PR PBB SHADOW E&M-EST. PATIENT-LVL III: CPT | Mod: PBBFAC,,, | Performed by: OPHTHALMOLOGY

## 2020-12-19 NOTE — PROGRESS NOTES
Subjective:       Patient ID: Tremayne Fortune is a 85 y.o. male      Chief Complaint   Patient presents with    Post-op Evaluation     History of Present Illness  HPI     1 day PO PPV - OS 12/15/2020    Pt along with granddaughter states that he is doing ok, no pain or   discomfort at this time.  Eye shield and patch was removed without   difficulty.  Vision is very blurry.  Sees smaller spot in vision.    Pt did not bring drops today.          Last edited by Alexander Shultz MD on 12/19/2020  2:02 PM. (History)          Assessment/Plan:     1. Macular subretinal hemorrhage, left  Doing well POD#1  PF 0/4  Vig 0/4  Atropine 0/1  No vigorous activity, no lifting, bending, etc.  Infante shield sleeping  Infante shield, glasses, or sunglasses all times for protection   No shower   RD/Endophthalmitis precautions   RTC 1 wk sooner PRN if any problems (kian pain, redness, dimming or loss of vision)      Follow up in about 1 week (around 12/23/2020), or if symptoms worsen or fail to improve, for Post-op.

## 2020-12-28 ENCOUNTER — OFFICE VISIT (OUTPATIENT)
Dept: OPHTHALMOLOGY | Facility: CLINIC | Age: 85
End: 2020-12-28
Attending: OPHTHALMOLOGY
Payer: MEDICARE

## 2020-12-28 DIAGNOSIS — H35.62 MACULAR SUBRETINAL HEMORRHAGE, LEFT: Primary | ICD-10-CM

## 2020-12-28 DIAGNOSIS — H35.3221 EXUDATIVE AGE-RELATED MACULAR DEGENERATION, LEFT EYE, WITH ACTIVE CHOROIDAL NEOVASCULARIZATION: ICD-10-CM

## 2020-12-28 PROCEDURE — 99999 PR PBB SHADOW E&M-EST. PATIENT-LVL III: CPT | Mod: PBBFAC,,, | Performed by: OPHTHALMOLOGY

## 2020-12-28 PROCEDURE — 99024 POSTOP FOLLOW-UP VISIT: CPT | Mod: S$GLB,,, | Performed by: OPHTHALMOLOGY

## 2020-12-28 PROCEDURE — 3288F PR FALLS RISK ASSESSMENT DOCUMENTED: ICD-10-PCS | Mod: CPTII,S$GLB,, | Performed by: OPHTHALMOLOGY

## 2020-12-28 PROCEDURE — 99999 PR PBB SHADOW E&M-EST. PATIENT-LVL III: ICD-10-PCS | Mod: PBBFAC,,, | Performed by: OPHTHALMOLOGY

## 2020-12-28 PROCEDURE — 1101F PT FALLS ASSESS-DOCD LE1/YR: CPT | Mod: CPTII,S$GLB,, | Performed by: OPHTHALMOLOGY

## 2020-12-28 PROCEDURE — 99024 PR POST-OP FOLLOW-UP VISIT: ICD-10-PCS | Mod: S$GLB,,, | Performed by: OPHTHALMOLOGY

## 2020-12-28 PROCEDURE — 1126F PR PAIN SEVERITY QUANTIFIED, NO PAIN PRESENT: ICD-10-PCS | Mod: S$GLB,,, | Performed by: OPHTHALMOLOGY

## 2020-12-28 PROCEDURE — 1101F PR PT FALLS ASSESS DOC 0-1 FALLS W/OUT INJ PAST YR: ICD-10-PCS | Mod: CPTII,S$GLB,, | Performed by: OPHTHALMOLOGY

## 2020-12-28 PROCEDURE — 3288F FALL RISK ASSESSMENT DOCD: CPT | Mod: CPTII,S$GLB,, | Performed by: OPHTHALMOLOGY

## 2020-12-28 PROCEDURE — 1126F AMNT PAIN NOTED NONE PRSNT: CPT | Mod: S$GLB,,, | Performed by: OPHTHALMOLOGY

## 2020-12-28 RX ORDER — MOXIFLOXACIN 5 MG/ML
1 SOLUTION/ DROPS OPHTHALMIC 4 TIMES DAILY
Status: ON HOLD | COMMUNITY
End: 2021-02-23 | Stop reason: HOSPADM

## 2020-12-28 RX ORDER — ATROPINE SULFATE 10 MG/ML
1 SOLUTION/ DROPS OPHTHALMIC DAILY
Status: ON HOLD | COMMUNITY
End: 2021-02-23 | Stop reason: HOSPADM

## 2020-12-28 RX ORDER — PREDNISOLONE ACETATE 10 MG/ML
1 SUSPENSION/ DROPS OPHTHALMIC 4 TIMES DAILY
Status: ON HOLD | COMMUNITY
End: 2021-02-23 | Stop reason: HOSPADM

## 2020-12-28 NOTE — PROGRESS NOTES
Subjective:       Patient ID: Tremayne Fortune is a 85 y.o. male      Chief Complaint   Patient presents with    macular sub retinal heme left eye     History of Present Illness  HPI     DLS 12/16/2020 Dr Shultz       POW 2 PPV OS 12/15/2020    Pt reports that he is using the gtts as instructed, no eye pain , no F/F  Saw the line of bubble.  Now sees [place] [facility] looking down.    Dark figure 8 became lighter spot.  Still blurry      Gtts   PF QID OS  vigamox QID OS (ran out of Vigamox on 12/27/2020 )  Atropine QD OS    Ocular HX   Macular subretinal heme OS              Last edited by Alexander Shultz MD on 12/28/2020  3:29 PM. (History)          Assessment/Plan:     1. Macular subretinal hemorrhage, left  Doing well POD#1  Taper PF OS 2-1-0 dec by 1 gtt/day/wk until off in 2 wks  Vig d/c  Atropine d/c    RD/Endophthalmitis precautions     RTC 2 wks sooner PRN if any problems (kian pain, redness, dimming or loss of vision)      Follow up in about 2 weeks (around 1/11/2021), or if symptoms worsen or fail to improve, for OCT and INJECTION ONLY, Injection Left eye, Avastin.

## 2021-01-11 ENCOUNTER — OFFICE VISIT (OUTPATIENT)
Dept: FAMILY MEDICINE | Facility: CLINIC | Age: 86
End: 2021-01-11
Payer: MEDICARE

## 2021-01-11 ENCOUNTER — LAB VISIT (OUTPATIENT)
Dept: LAB | Facility: HOSPITAL | Age: 86
End: 2021-01-11
Attending: FAMILY MEDICINE
Payer: MEDICARE

## 2021-01-11 VITALS
OXYGEN SATURATION: 98 % | DIASTOLIC BLOOD PRESSURE: 70 MMHG | BODY MASS INDEX: 25.72 KG/M2 | WEIGHT: 160.06 LBS | HEIGHT: 66 IN | SYSTOLIC BLOOD PRESSURE: 124 MMHG | HEART RATE: 82 BPM | TEMPERATURE: 97 F

## 2021-01-11 DIAGNOSIS — I10 ESSENTIAL HYPERTENSION: ICD-10-CM

## 2021-01-11 DIAGNOSIS — E78.5 DYSLIPIDEMIA: ICD-10-CM

## 2021-01-11 DIAGNOSIS — Z85.038 HISTORY OF COLON CANCER: ICD-10-CM

## 2021-01-11 DIAGNOSIS — E66.3 OVERWEIGHT (BMI 25.0-29.9): ICD-10-CM

## 2021-01-11 DIAGNOSIS — H35.62 MACULAR SUBRETINAL HEMORRHAGE OF LEFT EYE: ICD-10-CM

## 2021-01-11 DIAGNOSIS — Z00.00 VISIT FOR WELL MAN HEALTH CHECK: ICD-10-CM

## 2021-01-11 DIAGNOSIS — I25.810 CORONARY ARTERY DISEASE INVOLVING CORONARY BYPASS GRAFT OF NATIVE HEART WITHOUT ANGINA PECTORIS: ICD-10-CM

## 2021-01-11 DIAGNOSIS — Z00.00 VISIT FOR WELL MAN HEALTH CHECK: Primary | ICD-10-CM

## 2021-01-11 LAB
ALBUMIN SERPL BCP-MCNC: 3.8 G/DL (ref 3.5–5.2)
ALP SERPL-CCNC: 108 U/L (ref 55–135)
ALT SERPL W/O P-5'-P-CCNC: 24 U/L (ref 10–44)
ANION GAP SERPL CALC-SCNC: 5 MMOL/L (ref 8–16)
AST SERPL-CCNC: 25 U/L (ref 10–40)
BASOPHILS # BLD AUTO: 0.02 K/UL (ref 0–0.2)
BASOPHILS NFR BLD: 0.4 % (ref 0–1.9)
BILIRUB SERPL-MCNC: 0.7 MG/DL (ref 0.1–1)
BUN SERPL-MCNC: 10 MG/DL (ref 8–23)
CALCIUM SERPL-MCNC: 9.3 MG/DL (ref 8.7–10.5)
CHLORIDE SERPL-SCNC: 102 MMOL/L (ref 95–110)
CHOLEST SERPL-MCNC: 138 MG/DL (ref 120–199)
CHOLEST/HDLC SERPL: 3.1 {RATIO} (ref 2–5)
CO2 SERPL-SCNC: 31 MMOL/L (ref 23–29)
CREAT SERPL-MCNC: 0.8 MG/DL (ref 0.5–1.4)
DIFFERENTIAL METHOD: ABNORMAL
EOSINOPHIL # BLD AUTO: 0.1 K/UL (ref 0–0.5)
EOSINOPHIL NFR BLD: 2.5 % (ref 0–8)
ERYTHROCYTE [DISTWIDTH] IN BLOOD BY AUTOMATED COUNT: 13.2 % (ref 11.5–14.5)
EST. GFR  (AFRICAN AMERICAN): >60 ML/MIN/1.73 M^2
EST. GFR  (NON AFRICAN AMERICAN): >60 ML/MIN/1.73 M^2
GLUCOSE SERPL-MCNC: 92 MG/DL (ref 70–110)
HCT VFR BLD AUTO: 40.9 % (ref 40–54)
HDLC SERPL-MCNC: 44 MG/DL (ref 40–75)
HDLC SERPL: 31.9 % (ref 20–50)
HGB BLD-MCNC: 13.7 G/DL (ref 14–18)
IMM GRANULOCYTES # BLD AUTO: 0.02 K/UL (ref 0–0.04)
IMM GRANULOCYTES NFR BLD AUTO: 0.4 % (ref 0–0.5)
LDLC SERPL CALC-MCNC: 63.2 MG/DL (ref 63–159)
LYMPHOCYTES # BLD AUTO: 0.9 K/UL (ref 1–4.8)
LYMPHOCYTES NFR BLD: 17.7 % (ref 18–48)
MCH RBC QN AUTO: 31.6 PG (ref 27–31)
MCHC RBC AUTO-ENTMCNC: 33.5 G/DL (ref 32–36)
MCV RBC AUTO: 95 FL (ref 82–98)
MONOCYTES # BLD AUTO: 0.5 K/UL (ref 0.3–1)
MONOCYTES NFR BLD: 9.2 % (ref 4–15)
NEUTROPHILS # BLD AUTO: 3.7 K/UL (ref 1.8–7.7)
NEUTROPHILS NFR BLD: 69.8 % (ref 38–73)
NONHDLC SERPL-MCNC: 94 MG/DL
NRBC BLD-RTO: 0 /100 WBC
PLATELET # BLD AUTO: 180 K/UL (ref 150–350)
PMV BLD AUTO: 9.4 FL (ref 9.2–12.9)
POTASSIUM SERPL-SCNC: 4.5 MMOL/L (ref 3.5–5.1)
PROT SERPL-MCNC: 7.4 G/DL (ref 6–8.4)
RBC # BLD AUTO: 4.33 M/UL (ref 4.6–6.2)
SODIUM SERPL-SCNC: 138 MMOL/L (ref 136–145)
TRIGL SERPL-MCNC: 154 MG/DL (ref 30–150)
WBC # BLD AUTO: 5.24 K/UL (ref 3.9–12.7)

## 2021-01-11 PROCEDURE — 3074F SYST BP LT 130 MM HG: CPT | Mod: CPTII,S$GLB,, | Performed by: FAMILY MEDICINE

## 2021-01-11 PROCEDURE — 1101F PT FALLS ASSESS-DOCD LE1/YR: CPT | Mod: CPTII,S$GLB,, | Performed by: FAMILY MEDICINE

## 2021-01-11 PROCEDURE — 1126F PR PAIN SEVERITY QUANTIFIED, NO PAIN PRESENT: ICD-10-PCS | Mod: S$GLB,,, | Performed by: FAMILY MEDICINE

## 2021-01-11 PROCEDURE — 99214 PR OFFICE/OUTPT VISIT, EST, LEVL IV, 30-39 MIN: ICD-10-PCS | Mod: S$GLB,,, | Performed by: FAMILY MEDICINE

## 2021-01-11 PROCEDURE — 3288F FALL RISK ASSESSMENT DOCD: CPT | Mod: CPTII,S$GLB,, | Performed by: FAMILY MEDICINE

## 2021-01-11 PROCEDURE — 80061 LIPID PANEL: CPT

## 2021-01-11 PROCEDURE — 1126F AMNT PAIN NOTED NONE PRSNT: CPT | Mod: S$GLB,,, | Performed by: FAMILY MEDICINE

## 2021-01-11 PROCEDURE — 99999 PR PBB SHADOW E&M-EST. PATIENT-LVL IV: ICD-10-PCS | Mod: PBBFAC,,, | Performed by: FAMILY MEDICINE

## 2021-01-11 PROCEDURE — 99214 OFFICE O/P EST MOD 30 MIN: CPT | Mod: S$GLB,,, | Performed by: FAMILY MEDICINE

## 2021-01-11 PROCEDURE — 3078F DIAST BP <80 MM HG: CPT | Mod: CPTII,S$GLB,, | Performed by: FAMILY MEDICINE

## 2021-01-11 PROCEDURE — 99999 PR PBB SHADOW E&M-EST. PATIENT-LVL IV: CPT | Mod: PBBFAC,,, | Performed by: FAMILY MEDICINE

## 2021-01-11 PROCEDURE — 1101F PR PT FALLS ASSESS DOC 0-1 FALLS W/OUT INJ PAST YR: ICD-10-PCS | Mod: CPTII,S$GLB,, | Performed by: FAMILY MEDICINE

## 2021-01-11 PROCEDURE — 85025 COMPLETE CBC W/AUTO DIFF WBC: CPT

## 2021-01-11 PROCEDURE — 3074F PR MOST RECENT SYSTOLIC BLOOD PRESSURE < 130 MM HG: ICD-10-PCS | Mod: CPTII,S$GLB,, | Performed by: FAMILY MEDICINE

## 2021-01-11 PROCEDURE — 36415 COLL VENOUS BLD VENIPUNCTURE: CPT | Mod: PN

## 2021-01-11 PROCEDURE — 3078F PR MOST RECENT DIASTOLIC BLOOD PRESSURE < 80 MM HG: ICD-10-PCS | Mod: CPTII,S$GLB,, | Performed by: FAMILY MEDICINE

## 2021-01-11 PROCEDURE — 80053 COMPREHEN METABOLIC PANEL: CPT

## 2021-01-11 PROCEDURE — 3288F PR FALLS RISK ASSESSMENT DOCUMENTED: ICD-10-PCS | Mod: CPTII,S$GLB,, | Performed by: FAMILY MEDICINE

## 2021-01-13 ENCOUNTER — IMMUNIZATION (OUTPATIENT)
Dept: OBSTETRICS AND GYNECOLOGY | Facility: CLINIC | Age: 86
End: 2021-01-13
Payer: MEDICARE

## 2021-01-13 DIAGNOSIS — Z23 NEED FOR VACCINATION: ICD-10-CM

## 2021-01-13 PROCEDURE — 91300 COVID-19, MRNA, LNP-S, PF, 30 MCG/0.3 ML DOSE VACCINE: CPT | Mod: PBBFAC | Performed by: FAMILY MEDICINE

## 2021-01-20 ENCOUNTER — PROCEDURE VISIT (OUTPATIENT)
Dept: OPHTHALMOLOGY | Facility: CLINIC | Age: 86
End: 2021-01-20
Attending: OPHTHALMOLOGY
Payer: MEDICARE

## 2021-01-20 DIAGNOSIS — H35.3221 EXUDATIVE AGE-RELATED MACULAR DEGENERATION, LEFT EYE, WITH ACTIVE CHOROIDAL NEOVASCULARIZATION: ICD-10-CM

## 2021-01-20 DIAGNOSIS — H35.62 MACULAR SUBRETINAL HEMORRHAGE, LEFT: Primary | ICD-10-CM

## 2021-01-20 PROCEDURE — 92134 CPTRZ OPH DX IMG PST SGM RTA: CPT | Mod: S$GLB,,, | Performed by: OPHTHALMOLOGY

## 2021-01-20 PROCEDURE — 99499 RISK ADDL DX/OHS AUDIT: ICD-10-PCS | Mod: S$GLB,,, | Performed by: OPHTHALMOLOGY

## 2021-01-20 PROCEDURE — 99024 POSTOP FOLLOW-UP VISIT: CPT | Mod: S$GLB,,, | Performed by: OPHTHALMOLOGY

## 2021-01-20 PROCEDURE — 67028 INJECTION EYE DRUG: CPT | Mod: 79,LT,S$GLB, | Performed by: OPHTHALMOLOGY

## 2021-01-20 PROCEDURE — 92134 POSTERIOR SEGMENT OCT RETINA (OCULAR COHERENCE TOMOGRAPHY)-BOTH EYES: ICD-10-PCS | Mod: S$GLB,,, | Performed by: OPHTHALMOLOGY

## 2021-01-20 PROCEDURE — 99499 UNLISTED E&M SERVICE: CPT | Mod: S$GLB,,, | Performed by: OPHTHALMOLOGY

## 2021-01-20 PROCEDURE — 67028 PR INJECT INTRAVITREAL PHARMCOLOGIC: ICD-10-PCS | Mod: 79,LT,S$GLB, | Performed by: OPHTHALMOLOGY

## 2021-01-20 PROCEDURE — 99024 PR POST-OP FOLLOW-UP VISIT: ICD-10-PCS | Mod: S$GLB,,, | Performed by: OPHTHALMOLOGY

## 2021-01-20 RX ADMIN — Medication 1.25 MG: at 02:01

## 2021-02-03 ENCOUNTER — IMMUNIZATION (OUTPATIENT)
Dept: OBSTETRICS AND GYNECOLOGY | Facility: CLINIC | Age: 86
End: 2021-02-03
Payer: MEDICARE

## 2021-02-03 DIAGNOSIS — Z23 NEED FOR VACCINATION: Primary | ICD-10-CM

## 2021-02-03 PROCEDURE — 0002A COVID-19, MRNA, LNP-S, PF, 30 MCG/0.3 ML DOSE VACCINE: CPT | Mod: PBBFAC | Performed by: FAMILY MEDICINE

## 2021-02-03 PROCEDURE — 91300 COVID-19, MRNA, LNP-S, PF, 30 MCG/0.3 ML DOSE VACCINE: CPT | Mod: PBBFAC | Performed by: FAMILY MEDICINE

## 2021-02-18 ENCOUNTER — OFFICE VISIT (OUTPATIENT)
Dept: OPHTHALMOLOGY | Facility: CLINIC | Age: 86
End: 2021-02-18
Payer: MEDICARE

## 2021-02-18 ENCOUNTER — TELEPHONE (OUTPATIENT)
Dept: OPHTHALMOLOGY | Facility: CLINIC | Age: 86
End: 2021-02-18

## 2021-02-18 DIAGNOSIS — H33.22 RETINAL DETACHMENT, LEFT: Primary | ICD-10-CM

## 2021-02-18 PROCEDURE — 3288F FALL RISK ASSESSMENT DOCD: CPT | Mod: CPTII,S$GLB,, | Performed by: OPHTHALMOLOGY

## 2021-02-18 PROCEDURE — 1101F PT FALLS ASSESS-DOCD LE1/YR: CPT | Mod: CPTII,S$GLB,, | Performed by: OPHTHALMOLOGY

## 2021-02-18 PROCEDURE — 1126F AMNT PAIN NOTED NONE PRSNT: CPT | Mod: S$GLB,,, | Performed by: OPHTHALMOLOGY

## 2021-02-18 PROCEDURE — 1159F MED LIST DOCD IN RCRD: CPT | Mod: S$GLB,,, | Performed by: OPHTHALMOLOGY

## 2021-02-18 PROCEDURE — 1126F PR PAIN SEVERITY QUANTIFIED, NO PAIN PRESENT: ICD-10-PCS | Mod: S$GLB,,, | Performed by: OPHTHALMOLOGY

## 2021-02-18 PROCEDURE — 99024 POSTOP FOLLOW-UP VISIT: CPT | Mod: S$GLB,,, | Performed by: OPHTHALMOLOGY

## 2021-02-18 PROCEDURE — 99999 PR PBB SHADOW E&M-EST. PATIENT-LVL III: CPT | Mod: PBBFAC,,, | Performed by: OPHTHALMOLOGY

## 2021-02-18 PROCEDURE — 99999 PR PBB SHADOW E&M-EST. PATIENT-LVL III: ICD-10-PCS | Mod: PBBFAC,,, | Performed by: OPHTHALMOLOGY

## 2021-02-18 PROCEDURE — 76512 OPH US DX B-SCAN: CPT | Mod: LT,S$GLB,, | Performed by: OPHTHALMOLOGY

## 2021-02-18 PROCEDURE — 1159F PR MEDICATION LIST DOCUMENTED IN MEDICAL RECORD: ICD-10-PCS | Mod: S$GLB,,, | Performed by: OPHTHALMOLOGY

## 2021-02-18 PROCEDURE — 99024 PR POST-OP FOLLOW-UP VISIT: ICD-10-PCS | Mod: S$GLB,,, | Performed by: OPHTHALMOLOGY

## 2021-02-18 PROCEDURE — 3288F PR FALLS RISK ASSESSMENT DOCUMENTED: ICD-10-PCS | Mod: CPTII,S$GLB,, | Performed by: OPHTHALMOLOGY

## 2021-02-18 PROCEDURE — 76512 B SCAN: ICD-10-PCS | Mod: LT,S$GLB,, | Performed by: OPHTHALMOLOGY

## 2021-02-18 PROCEDURE — 1101F PR PT FALLS ASSESS DOC 0-1 FALLS W/OUT INJ PAST YR: ICD-10-PCS | Mod: CPTII,S$GLB,, | Performed by: OPHTHALMOLOGY

## 2021-02-20 ENCOUNTER — LAB VISIT (OUTPATIENT)
Dept: INTERNAL MEDICINE | Facility: CLINIC | Age: 86
End: 2021-02-20
Payer: MEDICARE

## 2021-02-20 DIAGNOSIS — H33.22 RETINAL DETACHMENT, LEFT: ICD-10-CM

## 2021-02-20 PROCEDURE — U0005 INFEC AGEN DETEC AMPLI PROBE: HCPCS

## 2021-02-20 PROCEDURE — U0003 INFECTIOUS AGENT DETECTION BY NUCLEIC ACID (DNA OR RNA); SEVERE ACUTE RESPIRATORY SYNDROME CORONAVIRUS 2 (SARS-COV-2) (CORONAVIRUS DISEASE [COVID-19]), AMPLIFIED PROBE TECHNIQUE, MAKING USE OF HIGH THROUGHPUT TECHNOLOGIES AS DESCRIBED BY CMS-2020-01-R: HCPCS

## 2021-02-21 LAB — SARS-COV-2 RNA RESP QL NAA+PROBE: NOT DETECTED

## 2021-02-22 ENCOUNTER — TELEPHONE (OUTPATIENT)
Dept: OPHTHALMOLOGY | Facility: CLINIC | Age: 86
End: 2021-02-22

## 2021-02-22 RX ORDER — PSYLLIUM HUSK 0.4 G
1 CAPSULE ORAL 4 TIMES DAILY
COMMUNITY

## 2021-02-23 ENCOUNTER — HOSPITAL ENCOUNTER (OUTPATIENT)
Facility: HOSPITAL | Age: 86
Discharge: HOME | End: 2021-02-23
Attending: OPHTHALMOLOGY | Admitting: OPHTHALMOLOGY
Payer: MEDICARE

## 2021-02-23 ENCOUNTER — ANESTHESIA (OUTPATIENT)
Dept: SURGERY | Facility: HOSPITAL | Age: 86
End: 2021-02-23
Payer: MEDICARE

## 2021-02-23 ENCOUNTER — ANESTHESIA EVENT (OUTPATIENT)
Dept: SURGERY | Facility: HOSPITAL | Age: 86
End: 2021-02-23
Payer: MEDICARE

## 2021-02-23 VITALS
DIASTOLIC BLOOD PRESSURE: 70 MMHG | HEART RATE: 62 BPM | WEIGHT: 156 LBS | SYSTOLIC BLOOD PRESSURE: 147 MMHG | RESPIRATION RATE: 14 BRPM | HEIGHT: 66 IN | TEMPERATURE: 97 F | BODY MASS INDEX: 25.07 KG/M2 | OXYGEN SATURATION: 99 %

## 2021-02-23 DIAGNOSIS — H33.052 TOTAL RETINAL DETACHMENT OF LEFT EYE: Primary | ICD-10-CM

## 2021-02-23 DIAGNOSIS — H33.22 RETINAL DETACHMENT, LEFT: ICD-10-CM

## 2021-02-23 PROCEDURE — 25000003 PHARM REV CODE 250: Performed by: OPHTHALMOLOGY

## 2021-02-23 PROCEDURE — D9220A PRA ANESTHESIA: ICD-10-PCS | Mod: CRNA,,, | Performed by: NURSE ANESTHETIST, CERTIFIED REGISTERED

## 2021-02-23 PROCEDURE — 63600175 PHARM REV CODE 636 W HCPCS: Performed by: NURSE ANESTHETIST, CERTIFIED REGISTERED

## 2021-02-23 PROCEDURE — 27201423 OPTIME MED/SURG SUP & DEVICES STERILE SUPPLY: Performed by: OPHTHALMOLOGY

## 2021-02-23 PROCEDURE — 71000015 HC POSTOP RECOV 1ST HR: Performed by: OPHTHALMOLOGY

## 2021-02-23 PROCEDURE — 27800903 OPTIME MED/SURG SUP & DEVICES OTHER IMPLANTS: Performed by: OPHTHALMOLOGY

## 2021-02-23 PROCEDURE — 71000044 HC DOSC ROUTINE RECOVERY FIRST HOUR: Performed by: OPHTHALMOLOGY

## 2021-02-23 PROCEDURE — 36000709 HC OR TIME LEV III EA ADD 15 MIN: Performed by: OPHTHALMOLOGY

## 2021-02-23 PROCEDURE — 67113 REPAIR RETINAL DETACH CPLX: CPT | Mod: 79,LT,, | Performed by: OPHTHALMOLOGY

## 2021-02-23 PROCEDURE — 71000033 HC RECOVERY, INTIAL HOUR: Performed by: OPHTHALMOLOGY

## 2021-02-23 PROCEDURE — 71000039 HC RECOVERY, EACH ADD'L HOUR: Performed by: OPHTHALMOLOGY

## 2021-02-23 PROCEDURE — 63600175 PHARM REV CODE 636 W HCPCS: Performed by: ANESTHESIOLOGY

## 2021-02-23 PROCEDURE — C1784 OCULAR DEV, INTRAOP, DET RET: HCPCS | Performed by: OPHTHALMOLOGY

## 2021-02-23 PROCEDURE — S0020 INJECTION, BUPIVICAINE HYDRO: HCPCS | Performed by: OPHTHALMOLOGY

## 2021-02-23 PROCEDURE — 37000009 HC ANESTHESIA EA ADD 15 MINS: Performed by: OPHTHALMOLOGY

## 2021-02-23 PROCEDURE — 71000016 HC POSTOP RECOV ADDL HR: Performed by: OPHTHALMOLOGY

## 2021-02-23 PROCEDURE — 25000003 PHARM REV CODE 250

## 2021-02-23 PROCEDURE — D9220A PRA ANESTHESIA: ICD-10-PCS | Mod: ANES,,, | Performed by: ANESTHESIOLOGY

## 2021-02-23 PROCEDURE — C1814 RETINAL TAMP, SILICONE OIL: HCPCS | Performed by: OPHTHALMOLOGY

## 2021-02-23 PROCEDURE — D9220A PRA ANESTHESIA: Mod: CRNA,,, | Performed by: NURSE ANESTHETIST, CERTIFIED REGISTERED

## 2021-02-23 PROCEDURE — 67113 PR REPAIR COMPLEX RETINA DETACH VITRECTOMY & MEMB PEEL: ICD-10-PCS | Mod: 79,LT,, | Performed by: OPHTHALMOLOGY

## 2021-02-23 PROCEDURE — 36000708 HC OR TIME LEV III 1ST 15 MIN: Performed by: OPHTHALMOLOGY

## 2021-02-23 PROCEDURE — 63600175 PHARM REV CODE 636 W HCPCS: Performed by: OPHTHALMOLOGY

## 2021-02-23 PROCEDURE — 27600004 OPTIME MED/SURG SUP & DEVICES INTRAOCULAR LENS: Performed by: OPHTHALMOLOGY

## 2021-02-23 PROCEDURE — 37000008 HC ANESTHESIA 1ST 15 MINUTES: Performed by: OPHTHALMOLOGY

## 2021-02-23 PROCEDURE — 25000003 PHARM REV CODE 250: Performed by: NURSE ANESTHETIST, CERTIFIED REGISTERED

## 2021-02-23 PROCEDURE — D9220A PRA ANESTHESIA: Mod: ANES,,, | Performed by: ANESTHESIOLOGY

## 2021-02-23 DEVICE — IMPLANTABLE DEVICE: Type: IMPLANTABLE DEVICE | Site: EYE | Status: FUNCTIONAL

## 2021-02-23 RX ORDER — PHENYLEPHRINE HYDROCHLORIDE 25 MG/ML
SOLUTION/ DROPS OPHTHALMIC
Status: COMPLETED
Start: 2021-02-23 | End: 2021-02-23

## 2021-02-23 RX ORDER — FENTANYL CITRATE 50 UG/ML
INJECTION, SOLUTION INTRAMUSCULAR; INTRAVENOUS
Status: DISCONTINUED | OUTPATIENT
Start: 2021-02-23 | End: 2021-02-23

## 2021-02-23 RX ORDER — DEXAMETHASONE SODIUM PHOSPHATE 4 MG/ML
INJECTION, SOLUTION INTRA-ARTICULAR; INTRALESIONAL; INTRAMUSCULAR; INTRAVENOUS; SOFT TISSUE
Status: DISCONTINUED | OUTPATIENT
Start: 2021-02-23 | End: 2021-02-23 | Stop reason: HOSPADM

## 2021-02-23 RX ORDER — PREDNISOLONE ACETATE 10 MG/ML
1 SUSPENSION/ DROPS OPHTHALMIC
Status: DISCONTINUED | OUTPATIENT
Start: 2021-02-23 | End: 2021-02-23 | Stop reason: HOSPADM

## 2021-02-23 RX ORDER — TETRACAINE HYDROCHLORIDE 5 MG/ML
1 SOLUTION OPHTHALMIC
Status: DISCONTINUED | OUTPATIENT
Start: 2021-02-23 | End: 2021-02-23 | Stop reason: HOSPADM

## 2021-02-23 RX ORDER — BUPIVACAINE HYDROCHLORIDE 7.5 MG/ML
INJECTION, SOLUTION EPIDURAL; RETROBULBAR
Status: DISCONTINUED | OUTPATIENT
Start: 2021-02-23 | End: 2021-02-23 | Stop reason: HOSPADM

## 2021-02-23 RX ORDER — ACETAMINOPHEN 325 MG/1
325 TABLET ORAL EVERY 6 HOURS PRN
Refills: 0
Start: 2021-02-23 | End: 2021-07-12 | Stop reason: SDUPTHER

## 2021-02-23 RX ORDER — EPHEDRINE SULFATE 50 MG/ML
INJECTION, SOLUTION INTRAVENOUS
Status: DISCONTINUED | OUTPATIENT
Start: 2021-02-23 | End: 2021-02-23

## 2021-02-23 RX ORDER — ACETAMINOPHEN 325 MG/1
650 TABLET ORAL EVERY 4 HOURS PRN
Status: DISCONTINUED | OUTPATIENT
Start: 2021-02-23 | End: 2021-02-23 | Stop reason: HOSPADM

## 2021-02-23 RX ORDER — LIDOCAINE HYDROCHLORIDE 10 MG/ML
1 INJECTION, SOLUTION EPIDURAL; INFILTRATION; INTRACAUDAL; PERINEURAL ONCE
Status: COMPLETED | OUTPATIENT
Start: 2021-02-23 | End: 2021-02-23

## 2021-02-23 RX ORDER — PREDNISOLONE ACETATE 10 MG/ML
SUSPENSION/ DROPS OPHTHALMIC
Status: COMPLETED
Start: 2021-02-23 | End: 2021-02-23

## 2021-02-23 RX ORDER — TOBRAMYCIN AND DEXAMETHASONE 3; 1 MG/ML; MG/ML
1 SUSPENSION/ DROPS OPHTHALMIC
Status: DISCONTINUED | OUTPATIENT
Start: 2021-02-23 | End: 2021-02-23

## 2021-02-23 RX ORDER — HYDROCODONE BITARTRATE AND ACETAMINOPHEN 5; 325 MG/1; MG/1
1 TABLET ORAL EVERY 6 HOURS PRN
Qty: 12 TABLET | Refills: 0 | Status: SHIPPED | OUTPATIENT
Start: 2021-02-23 | End: 2021-02-23 | Stop reason: SDUPTHER

## 2021-02-23 RX ORDER — VANCOMYCIN HYDROCHLORIDE 500 MG/10ML
INJECTION, POWDER, LYOPHILIZED, FOR SOLUTION INTRAVENOUS
Status: DISCONTINUED | OUTPATIENT
Start: 2021-02-23 | End: 2021-02-23 | Stop reason: HOSPADM

## 2021-02-23 RX ORDER — TROPICAMIDE 10 MG/ML
1 SOLUTION/ DROPS OPHTHALMIC
Status: DISCONTINUED | OUTPATIENT
Start: 2021-02-23 | End: 2021-02-23 | Stop reason: HOSPADM

## 2021-02-23 RX ORDER — HYDROCODONE BITARTRATE AND ACETAMINOPHEN 5; 325 MG/1; MG/1
1 TABLET ORAL EVERY 6 HOURS PRN
Qty: 12 TABLET | Refills: 0 | Status: SHIPPED | OUTPATIENT
Start: 2021-02-23 | End: 2021-07-12

## 2021-02-23 RX ORDER — NEOMYCIN SULFATE, POLYMYXIN B SULFATE, AND DEXAMETHASONE 3.5; 10000; 1 MG/G; [USP'U]/G; MG/G
OINTMENT OPHTHALMIC
Status: DISCONTINUED
Start: 2021-02-23 | End: 2021-02-23 | Stop reason: HOSPADM

## 2021-02-23 RX ORDER — LIDOCAINE HYDROCHLORIDE 20 MG/ML
INJECTION, SOLUTION EPIDURAL; INFILTRATION; INTRACAUDAL; PERINEURAL
Status: DISCONTINUED
Start: 2021-02-23 | End: 2021-02-23 | Stop reason: HOSPADM

## 2021-02-23 RX ORDER — LIDOCAINE HYDROCHLORIDE 20 MG/ML
INJECTION INTRAVENOUS
Status: DISCONTINUED | OUTPATIENT
Start: 2021-02-23 | End: 2021-02-23

## 2021-02-23 RX ORDER — TETRACAINE HYDROCHLORIDE 5 MG/ML
SOLUTION OPHTHALMIC
Status: COMPLETED
Start: 2021-02-23 | End: 2021-02-23

## 2021-02-23 RX ORDER — PROPOFOL 10 MG/ML
VIAL (ML) INTRAVENOUS
Status: DISCONTINUED | OUTPATIENT
Start: 2021-02-23 | End: 2021-02-23

## 2021-02-23 RX ORDER — HYDROCODONE BITARTRATE AND ACETAMINOPHEN 5; 325 MG/1; MG/1
1 TABLET ORAL EVERY 4 HOURS PRN
Status: DISCONTINUED | OUTPATIENT
Start: 2021-02-23 | End: 2021-02-23 | Stop reason: HOSPADM

## 2021-02-23 RX ORDER — FENTANYL CITRATE 50 UG/ML
25 INJECTION, SOLUTION INTRAMUSCULAR; INTRAVENOUS EVERY 5 MIN PRN
Status: DISCONTINUED | OUTPATIENT
Start: 2021-02-23 | End: 2021-02-23 | Stop reason: HOSPADM

## 2021-02-23 RX ORDER — ATROPINE SULFATE 10 MG/ML
1 SOLUTION/ DROPS OPHTHALMIC
Status: DISCONTINUED | OUTPATIENT
Start: 2021-02-23 | End: 2021-02-23 | Stop reason: HOSPADM

## 2021-02-23 RX ORDER — PHENYLEPHRINE HYDROCHLORIDE 25 MG/ML
1 SOLUTION/ DROPS OPHTHALMIC
Status: DISCONTINUED | OUTPATIENT
Start: 2021-02-23 | End: 2021-02-23 | Stop reason: HOSPADM

## 2021-02-23 RX ORDER — EPINEPHRINE 1 MG/ML
INJECTION, SOLUTION INTRACARDIAC; INTRAMUSCULAR; INTRAVENOUS; SUBCUTANEOUS
Status: DISCONTINUED
Start: 2021-02-23 | End: 2021-02-23 | Stop reason: HOSPADM

## 2021-02-23 RX ORDER — DEXAMETHASONE SODIUM PHOSPHATE 4 MG/ML
INJECTION, SOLUTION INTRA-ARTICULAR; INTRALESIONAL; INTRAMUSCULAR; INTRAVENOUS; SOFT TISSUE
Status: DISCONTINUED
Start: 2021-02-23 | End: 2021-02-23 | Stop reason: HOSPADM

## 2021-02-23 RX ORDER — MOXIFLOXACIN 5 MG/ML
1 SOLUTION/ DROPS OPHTHALMIC
Status: DISCONTINUED | OUTPATIENT
Start: 2021-02-23 | End: 2021-02-23 | Stop reason: HOSPADM

## 2021-02-23 RX ORDER — MOXIFLOXACIN 5 MG/ML
SOLUTION/ DROPS OPHTHALMIC
Status: COMPLETED
Start: 2021-02-23 | End: 2021-02-23

## 2021-02-23 RX ORDER — BUPIVACAINE HYDROCHLORIDE 7.5 MG/ML
INJECTION, SOLUTION EPIDURAL; RETROBULBAR
Status: DISCONTINUED
Start: 2021-02-23 | End: 2021-02-23 | Stop reason: HOSPADM

## 2021-02-23 RX ORDER — TROPICAMIDE 10 MG/ML
SOLUTION/ DROPS OPHTHALMIC
Status: COMPLETED
Start: 2021-02-23 | End: 2021-02-23

## 2021-02-23 RX ORDER — GENTAMICIN SULFATE 40 MG/ML
INJECTION, SOLUTION INTRAMUSCULAR; INTRAVENOUS
Status: DISCONTINUED | OUTPATIENT
Start: 2021-02-23 | End: 2021-02-23 | Stop reason: HOSPADM

## 2021-02-23 RX ORDER — SODIUM CHLORIDE 0.9 % (FLUSH) 0.9 %
10 SYRINGE (ML) INJECTION
Status: DISCONTINUED | OUTPATIENT
Start: 2021-02-23 | End: 2021-02-23 | Stop reason: HOSPADM

## 2021-02-23 RX ORDER — ACETAMINOPHEN 10 MG/ML
1000 INJECTION, SOLUTION INTRAVENOUS ONCE
Status: COMPLETED | OUTPATIENT
Start: 2021-02-23 | End: 2021-02-23

## 2021-02-23 RX ORDER — EPINEPHRINE 1 MG/ML
INJECTION, SOLUTION INTRACARDIAC; INTRAMUSCULAR; INTRAVENOUS; SUBCUTANEOUS
Status: DISCONTINUED | OUTPATIENT
Start: 2021-02-23 | End: 2021-02-23 | Stop reason: HOSPADM

## 2021-02-23 RX ORDER — SODIUM CHLORIDE 9 MG/ML
INJECTION, SOLUTION INTRAVENOUS CONTINUOUS
Status: DISCONTINUED | OUTPATIENT
Start: 2021-02-23 | End: 2021-02-23 | Stop reason: HOSPADM

## 2021-02-23 RX ORDER — GENTAMICIN SULFATE 40 MG/ML
INJECTION, SOLUTION INTRAMUSCULAR; INTRAVENOUS
Status: DISCONTINUED
Start: 2021-02-23 | End: 2021-02-23 | Stop reason: HOSPADM

## 2021-02-23 RX ORDER — VANCOMYCIN HYDROCHLORIDE 500 MG/10ML
INJECTION, POWDER, LYOPHILIZED, FOR SOLUTION INTRAVENOUS
Status: DISCONTINUED
Start: 2021-02-23 | End: 2021-02-23 | Stop reason: HOSPADM

## 2021-02-23 RX ORDER — ATROPINE SULFATE 10 MG/ML
SOLUTION/ DROPS OPHTHALMIC
Status: COMPLETED
Start: 2021-02-23 | End: 2021-02-23

## 2021-02-23 RX ORDER — ONDANSETRON 2 MG/ML
4 INJECTION INTRAMUSCULAR; INTRAVENOUS ONCE AS NEEDED
Status: DISCONTINUED | OUTPATIENT
Start: 2021-02-23 | End: 2021-02-23 | Stop reason: HOSPADM

## 2021-02-23 RX ORDER — NEOMYCIN SULFATE, POLYMYXIN B SULFATE, AND DEXAMETHASONE 3.5; 10000; 1 MG/G; [USP'U]/G; MG/G
OINTMENT OPHTHALMIC
Status: DISCONTINUED | OUTPATIENT
Start: 2021-02-23 | End: 2021-02-23 | Stop reason: HOSPADM

## 2021-02-23 RX ADMIN — PROPOFOL 100 MG: 10 INJECTION, EMULSION INTRAVENOUS at 12:02

## 2021-02-23 RX ADMIN — PREDNISOLONE ACETATE 1 DROP: 10 SUSPENSION/ DROPS OPHTHALMIC at 12:02

## 2021-02-23 RX ADMIN — ACETAMINOPHEN 1000 MG: 10 INJECTION, SOLUTION INTRAVENOUS at 06:02

## 2021-02-23 RX ADMIN — TETRACAINE HYDROCHLORIDE 1 DROP: 5 SOLUTION OPHTHALMIC at 12:02

## 2021-02-23 RX ADMIN — SODIUM CHLORIDE: 0.9 INJECTION, SOLUTION INTRAVENOUS at 12:02

## 2021-02-23 RX ADMIN — LIDOCAINE HYDROCHLORIDE 2 MG: 10 INJECTION, SOLUTION EPIDURAL; INFILTRATION; INTRACAUDAL at 12:02

## 2021-02-23 RX ADMIN — LIDOCAINE HYDROCHLORIDE 70 MG: 20 INJECTION, SOLUTION INTRAVENOUS at 12:02

## 2021-02-23 RX ADMIN — TROPICAMIDE 1 DROP: 10 SOLUTION/ DROPS OPHTHALMIC at 12:02

## 2021-02-23 RX ADMIN — PREDNISOLONE ACETATE 1 DROP: 10 SUSPENSION OPHTHALMIC at 12:02

## 2021-02-23 RX ADMIN — ATROPINE SULFATE 1 DROP: 10 SOLUTION/ DROPS OPHTHALMIC at 12:02

## 2021-02-23 RX ADMIN — FENTANYL CITRATE 25 MCG: 50 INJECTION, SOLUTION INTRAMUSCULAR; INTRAVENOUS at 12:02

## 2021-02-23 RX ADMIN — MOXIFLOXACIN 1 DROP: 5 SOLUTION/ DROPS OPHTHALMIC at 12:02

## 2021-02-23 RX ADMIN — EPHEDRINE SULFATE 5 MG: 50 INJECTION INTRAVENOUS at 01:02

## 2021-02-23 RX ADMIN — PHENYLEPHRINE HYDROCHLORIDE 1 DROP: 25 SOLUTION/ DROPS OPHTHALMIC at 12:02

## 2021-02-23 RX ADMIN — ATROPINE SULFATE 1 DROP: 10 SOLUTION OPHTHALMIC at 12:02

## 2021-02-24 ENCOUNTER — OFFICE VISIT (OUTPATIENT)
Dept: OPHTHALMOLOGY | Facility: CLINIC | Age: 86
End: 2021-02-24
Payer: MEDICARE

## 2021-02-24 DIAGNOSIS — H33.22 RETINAL DETACHMENT, LEFT: Primary | ICD-10-CM

## 2021-02-24 PROCEDURE — 99024 POSTOP FOLLOW-UP VISIT: CPT | Mod: S$GLB,,, | Performed by: OPHTHALMOLOGY

## 2021-02-24 PROCEDURE — 3288F FALL RISK ASSESSMENT DOCD: CPT | Mod: CPTII,S$GLB,, | Performed by: OPHTHALMOLOGY

## 2021-02-24 PROCEDURE — 99024 PR POST-OP FOLLOW-UP VISIT: ICD-10-PCS | Mod: S$GLB,,, | Performed by: OPHTHALMOLOGY

## 2021-02-24 PROCEDURE — 1126F AMNT PAIN NOTED NONE PRSNT: CPT | Mod: S$GLB,,, | Performed by: OPHTHALMOLOGY

## 2021-02-24 PROCEDURE — 3288F PR FALLS RISK ASSESSMENT DOCUMENTED: ICD-10-PCS | Mod: CPTII,S$GLB,, | Performed by: OPHTHALMOLOGY

## 2021-02-24 PROCEDURE — 1126F PR PAIN SEVERITY QUANTIFIED, NO PAIN PRESENT: ICD-10-PCS | Mod: S$GLB,,, | Performed by: OPHTHALMOLOGY

## 2021-02-24 PROCEDURE — 1101F PT FALLS ASSESS-DOCD LE1/YR: CPT | Mod: CPTII,S$GLB,, | Performed by: OPHTHALMOLOGY

## 2021-02-24 PROCEDURE — 99999 PR PBB SHADOW E&M-EST. PATIENT-LVL III: CPT | Mod: PBBFAC,,, | Performed by: OPHTHALMOLOGY

## 2021-02-24 PROCEDURE — 99999 PR PBB SHADOW E&M-EST. PATIENT-LVL III: ICD-10-PCS | Mod: PBBFAC,,, | Performed by: OPHTHALMOLOGY

## 2021-02-24 PROCEDURE — 1101F PR PT FALLS ASSESS DOC 0-1 FALLS W/OUT INJ PAST YR: ICD-10-PCS | Mod: CPTII,S$GLB,, | Performed by: OPHTHALMOLOGY

## 2021-03-03 ENCOUNTER — OFFICE VISIT (OUTPATIENT)
Dept: OPHTHALMOLOGY | Facility: CLINIC | Age: 86
End: 2021-03-03
Attending: OPHTHALMOLOGY
Payer: MEDICARE

## 2021-03-03 DIAGNOSIS — H33.22 RETINAL DETACHMENT, LEFT: Primary | ICD-10-CM

## 2021-03-03 PROCEDURE — 99024 PR POST-OP FOLLOW-UP VISIT: ICD-10-PCS | Mod: S$GLB,,, | Performed by: OPHTHALMOLOGY

## 2021-03-03 PROCEDURE — 1125F AMNT PAIN NOTED PAIN PRSNT: CPT | Mod: S$GLB,,, | Performed by: OPHTHALMOLOGY

## 2021-03-03 PROCEDURE — 99999 PR PBB SHADOW E&M-EST. PATIENT-LVL III: CPT | Mod: PBBFAC,,, | Performed by: OPHTHALMOLOGY

## 2021-03-03 PROCEDURE — 3288F PR FALLS RISK ASSESSMENT DOCUMENTED: ICD-10-PCS | Mod: CPTII,S$GLB,, | Performed by: OPHTHALMOLOGY

## 2021-03-03 PROCEDURE — 99024 POSTOP FOLLOW-UP VISIT: CPT | Mod: S$GLB,,, | Performed by: OPHTHALMOLOGY

## 2021-03-03 PROCEDURE — 1101F PR PT FALLS ASSESS DOC 0-1 FALLS W/OUT INJ PAST YR: ICD-10-PCS | Mod: CPTII,S$GLB,, | Performed by: OPHTHALMOLOGY

## 2021-03-03 PROCEDURE — 99999 PR PBB SHADOW E&M-EST. PATIENT-LVL III: ICD-10-PCS | Mod: PBBFAC,,, | Performed by: OPHTHALMOLOGY

## 2021-03-03 PROCEDURE — 1125F PR PAIN SEVERITY QUANTIFIED, PAIN PRESENT: ICD-10-PCS | Mod: S$GLB,,, | Performed by: OPHTHALMOLOGY

## 2021-03-03 PROCEDURE — 3288F FALL RISK ASSESSMENT DOCD: CPT | Mod: CPTII,S$GLB,, | Performed by: OPHTHALMOLOGY

## 2021-03-03 PROCEDURE — 1101F PT FALLS ASSESS-DOCD LE1/YR: CPT | Mod: CPTII,S$GLB,, | Performed by: OPHTHALMOLOGY

## 2021-03-22 ENCOUNTER — OFFICE VISIT (OUTPATIENT)
Dept: OPHTHALMOLOGY | Facility: CLINIC | Age: 86
End: 2021-03-22
Attending: OPHTHALMOLOGY
Payer: MEDICARE

## 2021-03-22 DIAGNOSIS — H33.22 RETINAL DETACHMENT, LEFT: Primary | ICD-10-CM

## 2021-03-22 PROCEDURE — 99999 PR PBB SHADOW E&M-EST. PATIENT-LVL III: ICD-10-PCS | Mod: PBBFAC,,, | Performed by: OPHTHALMOLOGY

## 2021-03-22 PROCEDURE — 99024 PR POST-OP FOLLOW-UP VISIT: ICD-10-PCS | Mod: S$GLB,,, | Performed by: OPHTHALMOLOGY

## 2021-03-22 PROCEDURE — 1101F PT FALLS ASSESS-DOCD LE1/YR: CPT | Mod: CPTII,S$GLB,, | Performed by: OPHTHALMOLOGY

## 2021-03-22 PROCEDURE — 99024 POSTOP FOLLOW-UP VISIT: CPT | Mod: S$GLB,,, | Performed by: OPHTHALMOLOGY

## 2021-03-22 PROCEDURE — 1126F PR PAIN SEVERITY QUANTIFIED, NO PAIN PRESENT: ICD-10-PCS | Mod: S$GLB,,, | Performed by: OPHTHALMOLOGY

## 2021-03-22 PROCEDURE — 1126F AMNT PAIN NOTED NONE PRSNT: CPT | Mod: S$GLB,,, | Performed by: OPHTHALMOLOGY

## 2021-03-22 PROCEDURE — 3288F PR FALLS RISK ASSESSMENT DOCUMENTED: ICD-10-PCS | Mod: CPTII,S$GLB,, | Performed by: OPHTHALMOLOGY

## 2021-03-22 PROCEDURE — 99999 PR PBB SHADOW E&M-EST. PATIENT-LVL III: CPT | Mod: PBBFAC,,, | Performed by: OPHTHALMOLOGY

## 2021-03-22 PROCEDURE — 3288F FALL RISK ASSESSMENT DOCD: CPT | Mod: CPTII,S$GLB,, | Performed by: OPHTHALMOLOGY

## 2021-03-22 PROCEDURE — 1101F PR PT FALLS ASSESS DOC 0-1 FALLS W/OUT INJ PAST YR: ICD-10-PCS | Mod: CPTII,S$GLB,, | Performed by: OPHTHALMOLOGY

## 2021-03-22 RX ORDER — PREDNISOLONE ACETATE 10 MG/ML
1 SUSPENSION/ DROPS OPHTHALMIC 3 TIMES DAILY
Qty: 5 ML | Refills: 0 | Status: SHIPPED | OUTPATIENT
Start: 2021-03-22 | End: 2021-03-22 | Stop reason: SDUPTHER

## 2021-03-22 RX ORDER — PREDNISOLONE ACETATE 10 MG/ML
1 SUSPENSION/ DROPS OPHTHALMIC 3 TIMES DAILY
Qty: 5 ML | Refills: 0 | Status: SHIPPED | OUTPATIENT
Start: 2021-03-22 | End: 2022-03-09

## 2021-04-26 ENCOUNTER — OFFICE VISIT (OUTPATIENT)
Dept: OPHTHALMOLOGY | Facility: CLINIC | Age: 86
End: 2021-04-26
Attending: OPHTHALMOLOGY
Payer: MEDICARE

## 2021-04-26 DIAGNOSIS — H35.3112 NONEXUDATIVE AGE-RELATED MACULAR DEGENERATION, RIGHT EYE, INTERMEDIATE DRY STAGE: ICD-10-CM

## 2021-04-26 DIAGNOSIS — H33.22 RETINAL DETACHMENT, LEFT: Primary | ICD-10-CM

## 2021-04-26 PROCEDURE — 1126F PR PAIN SEVERITY QUANTIFIED, NO PAIN PRESENT: ICD-10-PCS | Mod: S$GLB,,, | Performed by: OPHTHALMOLOGY

## 2021-04-26 PROCEDURE — 1101F PR PT FALLS ASSESS DOC 0-1 FALLS W/OUT INJ PAST YR: ICD-10-PCS | Mod: CPTII,S$GLB,, | Performed by: OPHTHALMOLOGY

## 2021-04-26 PROCEDURE — 92134 POSTERIOR SEGMENT OCT RETINA (OCULAR COHERENCE TOMOGRAPHY)-BOTH EYES: ICD-10-PCS | Mod: S$GLB,,, | Performed by: OPHTHALMOLOGY

## 2021-04-26 PROCEDURE — 3288F PR FALLS RISK ASSESSMENT DOCUMENTED: ICD-10-PCS | Mod: CPTII,S$GLB,, | Performed by: OPHTHALMOLOGY

## 2021-04-26 PROCEDURE — 3288F FALL RISK ASSESSMENT DOCD: CPT | Mod: CPTII,S$GLB,, | Performed by: OPHTHALMOLOGY

## 2021-04-26 PROCEDURE — 99999 PR PBB SHADOW E&M-EST. PATIENT-LVL III: ICD-10-PCS | Mod: PBBFAC,,, | Performed by: OPHTHALMOLOGY

## 2021-04-26 PROCEDURE — 99999 PR PBB SHADOW E&M-EST. PATIENT-LVL III: CPT | Mod: PBBFAC,,, | Performed by: OPHTHALMOLOGY

## 2021-04-26 PROCEDURE — 99024 POSTOP FOLLOW-UP VISIT: CPT | Mod: S$GLB,,, | Performed by: OPHTHALMOLOGY

## 2021-04-26 PROCEDURE — 1126F AMNT PAIN NOTED NONE PRSNT: CPT | Mod: S$GLB,,, | Performed by: OPHTHALMOLOGY

## 2021-04-26 PROCEDURE — 92134 CPTRZ OPH DX IMG PST SGM RTA: CPT | Mod: S$GLB,,, | Performed by: OPHTHALMOLOGY

## 2021-04-26 PROCEDURE — 1101F PT FALLS ASSESS-DOCD LE1/YR: CPT | Mod: CPTII,S$GLB,, | Performed by: OPHTHALMOLOGY

## 2021-04-26 PROCEDURE — 99024 PR POST-OP FOLLOW-UP VISIT: ICD-10-PCS | Mod: S$GLB,,, | Performed by: OPHTHALMOLOGY

## 2021-05-04 ENCOUNTER — OFFICE VISIT (OUTPATIENT)
Dept: OPTOMETRY | Facility: CLINIC | Age: 86
End: 2021-05-04
Payer: MEDICARE

## 2021-05-04 DIAGNOSIS — H52.7 REFRACTIVE ERROR: Primary | ICD-10-CM

## 2021-05-04 PROCEDURE — 92015 DETERMINE REFRACTIVE STATE: CPT | Mod: S$GLB,,, | Performed by: OPTOMETRIST

## 2021-05-04 PROCEDURE — 99999 PR PBB SHADOW E&M-EST. PATIENT-LVL III: CPT | Mod: PBBFAC,,, | Performed by: OPTOMETRIST

## 2021-05-04 PROCEDURE — 92015 PR REFRACTION: ICD-10-PCS | Mod: S$GLB,,, | Performed by: OPTOMETRIST

## 2021-05-04 PROCEDURE — 3288F PR FALLS RISK ASSESSMENT DOCUMENTED: ICD-10-PCS | Mod: CPTII,S$GLB,, | Performed by: OPTOMETRIST

## 2021-05-04 PROCEDURE — 99999 PR PBB SHADOW E&M-EST. PATIENT-LVL III: ICD-10-PCS | Mod: PBBFAC,,, | Performed by: OPTOMETRIST

## 2021-05-04 PROCEDURE — 1101F PT FALLS ASSESS-DOCD LE1/YR: CPT | Mod: CPTII,S$GLB,, | Performed by: OPTOMETRIST

## 2021-05-04 PROCEDURE — 1126F PR PAIN SEVERITY QUANTIFIED, NO PAIN PRESENT: ICD-10-PCS | Mod: S$GLB,,, | Performed by: OPTOMETRIST

## 2021-05-04 PROCEDURE — 1101F PR PT FALLS ASSESS DOC 0-1 FALLS W/OUT INJ PAST YR: ICD-10-PCS | Mod: CPTII,S$GLB,, | Performed by: OPTOMETRIST

## 2021-05-04 PROCEDURE — 1126F AMNT PAIN NOTED NONE PRSNT: CPT | Mod: S$GLB,,, | Performed by: OPTOMETRIST

## 2021-05-04 PROCEDURE — 3288F FALL RISK ASSESSMENT DOCD: CPT | Mod: CPTII,S$GLB,, | Performed by: OPTOMETRIST

## 2021-06-08 ENCOUNTER — HOSPITAL ENCOUNTER (OUTPATIENT)
Dept: RADIOLOGY | Facility: HOSPITAL | Age: 86
Discharge: HOME | End: 2021-06-08
Attending: PODIATRIST
Payer: MEDICARE

## 2021-06-08 ENCOUNTER — OFFICE VISIT (OUTPATIENT)
Dept: PODIATRY | Facility: CLINIC | Age: 86
End: 2021-06-08
Payer: MEDICARE

## 2021-06-08 VITALS — WEIGHT: 156 LBS | BODY MASS INDEX: 25.07 KG/M2 | HEIGHT: 66 IN

## 2021-06-08 DIAGNOSIS — M79.672 FOOT PAIN, LEFT: Primary | ICD-10-CM

## 2021-06-08 DIAGNOSIS — M76.72 PERONEUS BREVIS TENDINITIS, LEFT: ICD-10-CM

## 2021-06-08 DIAGNOSIS — M79.672 FOOT PAIN, LEFT: ICD-10-CM

## 2021-06-08 DIAGNOSIS — S93.402A INVERSION SPRAIN OF ANKLE, LEFT, INITIAL ENCOUNTER: ICD-10-CM

## 2021-06-08 PROCEDURE — 1126F AMNT PAIN NOTED NONE PRSNT: CPT | Mod: S$GLB,,, | Performed by: PODIATRIST

## 2021-06-08 PROCEDURE — 99203 PR OFFICE/OUTPT VISIT, NEW, LEVL III, 30-44 MIN: ICD-10-PCS | Mod: S$GLB,,, | Performed by: PODIATRIST

## 2021-06-08 PROCEDURE — 99203 OFFICE O/P NEW LOW 30 MIN: CPT | Mod: S$GLB,,, | Performed by: PODIATRIST

## 2021-06-08 PROCEDURE — 73630 XR FOOT COMPLETE 3 VIEW LEFT: ICD-10-PCS | Mod: 26,LT,, | Performed by: RADIOLOGY

## 2021-06-08 PROCEDURE — 1126F PR PAIN SEVERITY QUANTIFIED, NO PAIN PRESENT: ICD-10-PCS | Mod: S$GLB,,, | Performed by: PODIATRIST

## 2021-06-08 PROCEDURE — 1101F PT FALLS ASSESS-DOCD LE1/YR: CPT | Mod: CPTII,S$GLB,, | Performed by: PODIATRIST

## 2021-06-08 PROCEDURE — 1159F PR MEDICATION LIST DOCUMENTED IN MEDICAL RECORD: ICD-10-PCS | Mod: S$GLB,,, | Performed by: PODIATRIST

## 2021-06-08 PROCEDURE — 1101F PR PT FALLS ASSESS DOC 0-1 FALLS W/OUT INJ PAST YR: ICD-10-PCS | Mod: CPTII,S$GLB,, | Performed by: PODIATRIST

## 2021-06-08 PROCEDURE — 1159F MED LIST DOCD IN RCRD: CPT | Mod: S$GLB,,, | Performed by: PODIATRIST

## 2021-06-08 PROCEDURE — 73630 X-RAY EXAM OF FOOT: CPT | Mod: 26,LT,, | Performed by: RADIOLOGY

## 2021-06-08 PROCEDURE — 3288F PR FALLS RISK ASSESSMENT DOCUMENTED: ICD-10-PCS | Mod: CPTII,S$GLB,, | Performed by: PODIATRIST

## 2021-06-08 PROCEDURE — 99999 PR PBB SHADOW E&M-EST. PATIENT-LVL III: CPT | Mod: PBBFAC,,, | Performed by: PODIATRIST

## 2021-06-08 PROCEDURE — 99999 PR PBB SHADOW E&M-EST. PATIENT-LVL III: ICD-10-PCS | Mod: PBBFAC,,, | Performed by: PODIATRIST

## 2021-06-08 PROCEDURE — 73630 X-RAY EXAM OF FOOT: CPT | Mod: TC,FY,PO,LT

## 2021-06-08 PROCEDURE — 3288F FALL RISK ASSESSMENT DOCD: CPT | Mod: CPTII,S$GLB,, | Performed by: PODIATRIST

## 2021-06-08 RX ORDER — METHYLPREDNISOLONE 4 MG/1
TABLET ORAL
Qty: 21 TABLET | Refills: 0 | Status: SHIPPED | OUTPATIENT
Start: 2021-06-08 | End: 2021-07-12

## 2021-07-07 ENCOUNTER — OFFICE VISIT (OUTPATIENT)
Dept: OPHTHALMOLOGY | Facility: CLINIC | Age: 86
End: 2021-07-07
Attending: OPHTHALMOLOGY
Payer: MEDICARE

## 2021-07-07 DIAGNOSIS — H35.62 MACULAR SUBRETINAL HEMORRHAGE, LEFT: ICD-10-CM

## 2021-07-07 DIAGNOSIS — H43.21 ASTEROID HYALOSIS, RIGHT: ICD-10-CM

## 2021-07-07 DIAGNOSIS — H35.3221 EXUDATIVE AGE-RELATED MACULAR DEGENERATION, LEFT EYE, WITH ACTIVE CHOROIDAL NEOVASCULARIZATION: ICD-10-CM

## 2021-07-07 DIAGNOSIS — H33.22 RETINAL DETACHMENT, LEFT: ICD-10-CM

## 2021-07-07 DIAGNOSIS — H35.3112 NONEXUDATIVE AGE-RELATED MACULAR DEGENERATION, RIGHT EYE, INTERMEDIATE DRY STAGE: Primary | ICD-10-CM

## 2021-07-07 PROCEDURE — 92134 CPTRZ OPH DX IMG PST SGM RTA: CPT | Mod: S$GLB,,, | Performed by: OPHTHALMOLOGY

## 2021-07-07 PROCEDURE — 99499 RISK ADDL DX/OHS AUDIT: ICD-10-PCS | Mod: S$GLB,,, | Performed by: OPHTHALMOLOGY

## 2021-07-07 PROCEDURE — 92134 POSTERIOR SEGMENT OCT RETINA (OCULAR COHERENCE TOMOGRAPHY)-BOTH EYES: ICD-10-PCS | Mod: S$GLB,,, | Performed by: OPHTHALMOLOGY

## 2021-07-07 PROCEDURE — 1126F AMNT PAIN NOTED NONE PRSNT: CPT | Mod: S$GLB,,, | Performed by: OPHTHALMOLOGY

## 2021-07-07 PROCEDURE — 92201 OPSCPY EXTND RTA DRAW UNI/BI: CPT | Mod: S$GLB,,, | Performed by: OPHTHALMOLOGY

## 2021-07-07 PROCEDURE — 99999 PR PBB SHADOW E&M-EST. PATIENT-LVL III: CPT | Mod: PBBFAC,,, | Performed by: OPHTHALMOLOGY

## 2021-07-07 PROCEDURE — 92201 PR OPHTHALMOSCOPY, EXT, W/RET DRAW/SCLERAL DEPR, I&R, UNI/BI: ICD-10-PCS | Mod: S$GLB,,, | Performed by: OPHTHALMOLOGY

## 2021-07-07 PROCEDURE — 99499 UNLISTED E&M SERVICE: CPT | Mod: S$GLB,,, | Performed by: OPHTHALMOLOGY

## 2021-07-07 PROCEDURE — 1101F PR PT FALLS ASSESS DOC 0-1 FALLS W/OUT INJ PAST YR: ICD-10-PCS | Mod: CPTII,S$GLB,, | Performed by: OPHTHALMOLOGY

## 2021-07-07 PROCEDURE — 92014 PR EYE EXAM, EST PATIENT,COMPREHESV: ICD-10-PCS | Mod: S$GLB,,, | Performed by: OPHTHALMOLOGY

## 2021-07-07 PROCEDURE — 92014 COMPRE OPH EXAM EST PT 1/>: CPT | Mod: S$GLB,,, | Performed by: OPHTHALMOLOGY

## 2021-07-07 PROCEDURE — 99999 PR PBB SHADOW E&M-EST. PATIENT-LVL III: ICD-10-PCS | Mod: PBBFAC,,, | Performed by: OPHTHALMOLOGY

## 2021-07-07 PROCEDURE — 3288F PR FALLS RISK ASSESSMENT DOCUMENTED: ICD-10-PCS | Mod: CPTII,S$GLB,, | Performed by: OPHTHALMOLOGY

## 2021-07-07 PROCEDURE — 1126F PR PAIN SEVERITY QUANTIFIED, NO PAIN PRESENT: ICD-10-PCS | Mod: S$GLB,,, | Performed by: OPHTHALMOLOGY

## 2021-07-07 PROCEDURE — 1101F PT FALLS ASSESS-DOCD LE1/YR: CPT | Mod: CPTII,S$GLB,, | Performed by: OPHTHALMOLOGY

## 2021-07-07 PROCEDURE — 3288F FALL RISK ASSESSMENT DOCD: CPT | Mod: CPTII,S$GLB,, | Performed by: OPHTHALMOLOGY

## 2021-07-07 RX ORDER — EZETIMIBE 10 MG/1
10 TABLET ORAL DAILY
COMMUNITY
Start: 2021-06-18

## 2021-07-12 ENCOUNTER — OFFICE VISIT (OUTPATIENT)
Dept: FAMILY MEDICINE | Facility: CLINIC | Age: 86
End: 2021-07-12
Payer: MEDICARE

## 2021-07-12 VITALS
SYSTOLIC BLOOD PRESSURE: 144 MMHG | TEMPERATURE: 99 F | HEIGHT: 66 IN | OXYGEN SATURATION: 97 % | BODY MASS INDEX: 25.61 KG/M2 | DIASTOLIC BLOOD PRESSURE: 64 MMHG | HEART RATE: 58 BPM | WEIGHT: 159.38 LBS

## 2021-07-12 DIAGNOSIS — D64.9 ACUTE ANEMIA: Primary | ICD-10-CM

## 2021-07-12 PROCEDURE — 1159F PR MEDICATION LIST DOCUMENTED IN MEDICAL RECORD: ICD-10-PCS | Mod: S$GLB,,, | Performed by: FAMILY MEDICINE

## 2021-07-12 PROCEDURE — 1126F AMNT PAIN NOTED NONE PRSNT: CPT | Mod: S$GLB,,, | Performed by: FAMILY MEDICINE

## 2021-07-12 PROCEDURE — 3288F FALL RISK ASSESSMENT DOCD: CPT | Mod: CPTII,S$GLB,, | Performed by: FAMILY MEDICINE

## 2021-07-12 PROCEDURE — 99999 PR PBB SHADOW E&M-EST. PATIENT-LVL IV: ICD-10-PCS | Mod: PBBFAC,,, | Performed by: FAMILY MEDICINE

## 2021-07-12 PROCEDURE — 3288F PR FALLS RISK ASSESSMENT DOCUMENTED: ICD-10-PCS | Mod: CPTII,S$GLB,, | Performed by: FAMILY MEDICINE

## 2021-07-12 PROCEDURE — 1159F MED LIST DOCD IN RCRD: CPT | Mod: S$GLB,,, | Performed by: FAMILY MEDICINE

## 2021-07-12 PROCEDURE — 1101F PR PT FALLS ASSESS DOC 0-1 FALLS W/OUT INJ PAST YR: ICD-10-PCS | Mod: CPTII,S$GLB,, | Performed by: FAMILY MEDICINE

## 2021-07-12 PROCEDURE — 99214 PR OFFICE/OUTPT VISIT, EST, LEVL IV, 30-39 MIN: ICD-10-PCS | Mod: S$GLB,,, | Performed by: FAMILY MEDICINE

## 2021-07-12 PROCEDURE — 1126F PR PAIN SEVERITY QUANTIFIED, NO PAIN PRESENT: ICD-10-PCS | Mod: S$GLB,,, | Performed by: FAMILY MEDICINE

## 2021-07-12 PROCEDURE — 1101F PT FALLS ASSESS-DOCD LE1/YR: CPT | Mod: CPTII,S$GLB,, | Performed by: FAMILY MEDICINE

## 2021-07-12 PROCEDURE — 99999 PR PBB SHADOW E&M-EST. PATIENT-LVL IV: CPT | Mod: PBBFAC,,, | Performed by: FAMILY MEDICINE

## 2021-07-12 PROCEDURE — 99214 OFFICE O/P EST MOD 30 MIN: CPT | Mod: S$GLB,,, | Performed by: FAMILY MEDICINE

## 2021-10-12 ENCOUNTER — IMMUNIZATION (OUTPATIENT)
Dept: OBSTETRICS AND GYNECOLOGY | Facility: CLINIC | Age: 86
End: 2021-10-12
Payer: MEDICARE

## 2021-10-12 DIAGNOSIS — Z23 NEED FOR VACCINATION: Primary | ICD-10-CM

## 2021-10-12 PROCEDURE — 91300 COVID-19, MRNA, LNP-S, PF, 30 MCG/0.3 ML DOSE VACCINE: CPT | Mod: PBBFAC | Performed by: FAMILY MEDICINE

## 2021-10-12 PROCEDURE — 0003A COVID-19, MRNA, LNP-S, PF, 30 MCG/0.3 ML DOSE VACCINE: CPT | Mod: PBBFAC | Performed by: FAMILY MEDICINE

## 2021-10-30 ENCOUNTER — PES CALL (OUTPATIENT)
Dept: ADMINISTRATIVE | Facility: CLINIC | Age: 86
End: 2021-10-30
Payer: MEDICARE

## 2021-11-08 ENCOUNTER — OFFICE VISIT (OUTPATIENT)
Dept: OPHTHALMOLOGY | Facility: CLINIC | Age: 86
End: 2021-11-08
Attending: OPHTHALMOLOGY
Payer: MEDICARE

## 2021-11-08 DIAGNOSIS — H33.22 RETINAL DETACHMENT, LEFT: ICD-10-CM

## 2021-11-08 DIAGNOSIS — H35.3221 EXUDATIVE AGE-RELATED MACULAR DEGENERATION, LEFT EYE, WITH ACTIVE CHOROIDAL NEOVASCULARIZATION: ICD-10-CM

## 2021-11-08 DIAGNOSIS — H35.3112 NONEXUDATIVE AGE-RELATED MACULAR DEGENERATION, RIGHT EYE, INTERMEDIATE DRY STAGE: Primary | ICD-10-CM

## 2021-11-08 DIAGNOSIS — H35.62 MACULAR SUBRETINAL HEMORRHAGE, LEFT: ICD-10-CM

## 2021-11-08 DIAGNOSIS — H43.21 ASTEROID HYALOSIS, RIGHT: ICD-10-CM

## 2021-11-08 PROCEDURE — 92012 PR EYE EXAM, EST PATIENT,INTERMED: ICD-10-PCS | Mod: S$GLB,,, | Performed by: OPHTHALMOLOGY

## 2021-11-08 PROCEDURE — 1160F PR REVIEW ALL MEDS BY PRESCRIBER/CLIN PHARMACIST DOCUMENTED: ICD-10-PCS | Mod: CPTII,S$GLB,, | Performed by: OPHTHALMOLOGY

## 2021-11-08 PROCEDURE — 99999 PR PBB SHADOW E&M-EST. PATIENT-LVL III: ICD-10-PCS | Mod: PBBFAC,,, | Performed by: OPHTHALMOLOGY

## 2021-11-08 PROCEDURE — 3288F PR FALLS RISK ASSESSMENT DOCUMENTED: ICD-10-PCS | Mod: CPTII,S$GLB,, | Performed by: OPHTHALMOLOGY

## 2021-11-08 PROCEDURE — 1126F PR PAIN SEVERITY QUANTIFIED, NO PAIN PRESENT: ICD-10-PCS | Mod: CPTII,S$GLB,, | Performed by: OPHTHALMOLOGY

## 2021-11-08 PROCEDURE — 1126F AMNT PAIN NOTED NONE PRSNT: CPT | Mod: CPTII,S$GLB,, | Performed by: OPHTHALMOLOGY

## 2021-11-08 PROCEDURE — 92134 POSTERIOR SEGMENT OCT RETINA (OCULAR COHERENCE TOMOGRAPHY)-BOTH EYES: ICD-10-PCS | Mod: S$GLB,,, | Performed by: OPHTHALMOLOGY

## 2021-11-08 PROCEDURE — 92012 INTRM OPH EXAM EST PATIENT: CPT | Mod: S$GLB,,, | Performed by: OPHTHALMOLOGY

## 2021-11-08 PROCEDURE — 92201 OPSCPY EXTND RTA DRAW UNI/BI: CPT | Mod: S$GLB,,, | Performed by: OPHTHALMOLOGY

## 2021-11-08 PROCEDURE — 3288F FALL RISK ASSESSMENT DOCD: CPT | Mod: CPTII,S$GLB,, | Performed by: OPHTHALMOLOGY

## 2021-11-08 PROCEDURE — 1101F PT FALLS ASSESS-DOCD LE1/YR: CPT | Mod: CPTII,S$GLB,, | Performed by: OPHTHALMOLOGY

## 2021-11-08 PROCEDURE — 1159F MED LIST DOCD IN RCRD: CPT | Mod: CPTII,S$GLB,, | Performed by: OPHTHALMOLOGY

## 2021-11-08 PROCEDURE — 99499 UNLISTED E&M SERVICE: CPT | Mod: S$GLB,,, | Performed by: OPHTHALMOLOGY

## 2021-11-08 PROCEDURE — 1159F PR MEDICATION LIST DOCUMENTED IN MEDICAL RECORD: ICD-10-PCS | Mod: CPTII,S$GLB,, | Performed by: OPHTHALMOLOGY

## 2021-11-08 PROCEDURE — 92201 PR OPHTHALMOSCOPY, EXT, W/RET DRAW/SCLERAL DEPR, I&R, UNI/BI: ICD-10-PCS | Mod: S$GLB,,, | Performed by: OPHTHALMOLOGY

## 2021-11-08 PROCEDURE — 1101F PR PT FALLS ASSESS DOC 0-1 FALLS W/OUT INJ PAST YR: ICD-10-PCS | Mod: CPTII,S$GLB,, | Performed by: OPHTHALMOLOGY

## 2021-11-08 PROCEDURE — 1160F RVW MEDS BY RX/DR IN RCRD: CPT | Mod: CPTII,S$GLB,, | Performed by: OPHTHALMOLOGY

## 2021-11-08 PROCEDURE — 99999 PR PBB SHADOW E&M-EST. PATIENT-LVL III: CPT | Mod: PBBFAC,,, | Performed by: OPHTHALMOLOGY

## 2021-11-08 PROCEDURE — 99499 RISK ADDL DX/OHS AUDIT: ICD-10-PCS | Mod: S$GLB,,, | Performed by: OPHTHALMOLOGY

## 2021-11-08 PROCEDURE — 92134 CPTRZ OPH DX IMG PST SGM RTA: CPT | Mod: S$GLB,,, | Performed by: OPHTHALMOLOGY

## 2021-11-18 ENCOUNTER — PES CALL (OUTPATIENT)
Dept: ADMINISTRATIVE | Facility: CLINIC | Age: 86
End: 2021-11-18
Payer: MEDICARE

## 2022-01-12 ENCOUNTER — OFFICE VISIT (OUTPATIENT)
Dept: FAMILY MEDICINE | Facility: CLINIC | Age: 87
End: 2022-01-12
Payer: MEDICARE

## 2022-01-12 ENCOUNTER — LAB VISIT (OUTPATIENT)
Dept: LAB | Facility: HOSPITAL | Age: 87
End: 2022-01-12
Attending: FAMILY MEDICINE
Payer: MEDICARE

## 2022-01-12 VITALS
DIASTOLIC BLOOD PRESSURE: 80 MMHG | RESPIRATION RATE: 16 BRPM | HEART RATE: 66 BPM | SYSTOLIC BLOOD PRESSURE: 136 MMHG | OXYGEN SATURATION: 96 %

## 2022-01-12 DIAGNOSIS — H35.3131 EARLY DRY STAGE NONEXUDATIVE AGE-RELATED MACULAR DEGENERATION OF BOTH EYES: ICD-10-CM

## 2022-01-12 DIAGNOSIS — I25.709 CORONARY ARTERY DISEASE INVOLVING CORONARY BYPASS GRAFT OF NATIVE HEART WITH ANGINA PECTORIS: ICD-10-CM

## 2022-01-12 DIAGNOSIS — I10 ESSENTIAL HYPERTENSION: ICD-10-CM

## 2022-01-12 DIAGNOSIS — I25.810 CORONARY ARTERY DISEASE INVOLVING CORONARY BYPASS GRAFT OF NATIVE HEART WITHOUT ANGINA PECTORIS: ICD-10-CM

## 2022-01-12 DIAGNOSIS — D64.9 ACUTE ANEMIA: ICD-10-CM

## 2022-01-12 DIAGNOSIS — E78.5 DYSLIPIDEMIA: ICD-10-CM

## 2022-01-12 DIAGNOSIS — Z85.038 HISTORY OF COLON CANCER: ICD-10-CM

## 2022-01-12 DIAGNOSIS — G62.0 DRUG-INDUCED POLYNEUROPATHY: ICD-10-CM

## 2022-01-12 DIAGNOSIS — Z00.00 VISIT FOR WELL MAN HEALTH CHECK: ICD-10-CM

## 2022-01-12 DIAGNOSIS — I73.9 PERIPHERAL VASCULAR DISEASE: ICD-10-CM

## 2022-01-12 DIAGNOSIS — H35.3221 EXUDATIVE AGE-RELATED MACULAR DEGENERATION, LEFT EYE, WITH ACTIVE CHOROIDAL NEOVASCULARIZATION: ICD-10-CM

## 2022-01-12 DIAGNOSIS — Z00.00 VISIT FOR WELL MAN HEALTH CHECK: Primary | ICD-10-CM

## 2022-01-12 DIAGNOSIS — D69.2 SENILE PURPURA: ICD-10-CM

## 2022-01-12 LAB
ALBUMIN SERPL BCP-MCNC: 3.7 G/DL (ref 3.5–5.2)
ALP SERPL-CCNC: 111 U/L (ref 55–135)
ALT SERPL W/O P-5'-P-CCNC: 31 U/L (ref 10–44)
ANION GAP SERPL CALC-SCNC: 10 MMOL/L (ref 8–16)
AST SERPL-CCNC: 31 U/L (ref 10–40)
BASOPHILS # BLD AUTO: 0.01 K/UL (ref 0–0.2)
BASOPHILS NFR BLD: 0.2 % (ref 0–1.9)
BILIRUB SERPL-MCNC: 0.6 MG/DL (ref 0.1–1)
BUN SERPL-MCNC: 9 MG/DL (ref 8–23)
CALCIUM SERPL-MCNC: 9.2 MG/DL (ref 8.7–10.5)
CHLORIDE SERPL-SCNC: 101 MMOL/L (ref 95–110)
CHOLEST SERPL-MCNC: 116 MG/DL (ref 120–199)
CHOLEST/HDLC SERPL: 2.8 {RATIO} (ref 2–5)
CO2 SERPL-SCNC: 28 MMOL/L (ref 23–29)
CREAT SERPL-MCNC: 0.8 MG/DL (ref 0.5–1.4)
DIFFERENTIAL METHOD: ABNORMAL
EOSINOPHIL # BLD AUTO: 0.2 K/UL (ref 0–0.5)
EOSINOPHIL NFR BLD: 3.2 % (ref 0–8)
ERYTHROCYTE [DISTWIDTH] IN BLOOD BY AUTOMATED COUNT: 14.5 % (ref 11.5–14.5)
EST. GFR  (AFRICAN AMERICAN): >60 ML/MIN/1.73 M^2
EST. GFR  (NON AFRICAN AMERICAN): >60 ML/MIN/1.73 M^2
GLUCOSE SERPL-MCNC: 86 MG/DL (ref 70–110)
HCT VFR BLD AUTO: 40.3 % (ref 40–54)
HDLC SERPL-MCNC: 42 MG/DL (ref 40–75)
HDLC SERPL: 36.2 % (ref 20–50)
HGB BLD-MCNC: 12.9 G/DL (ref 14–18)
IMM GRANULOCYTES # BLD AUTO: 0.01 K/UL (ref 0–0.04)
IMM GRANULOCYTES NFR BLD AUTO: 0.2 % (ref 0–0.5)
IRON SERPL-MCNC: 60 UG/DL (ref 45–160)
LDLC SERPL CALC-MCNC: 47.6 MG/DL (ref 63–159)
LYMPHOCYTES # BLD AUTO: 1 K/UL (ref 1–4.8)
LYMPHOCYTES NFR BLD: 19.3 % (ref 18–48)
MCH RBC QN AUTO: 30.3 PG (ref 27–31)
MCHC RBC AUTO-ENTMCNC: 32 G/DL (ref 32–36)
MCV RBC AUTO: 95 FL (ref 82–98)
MONOCYTES # BLD AUTO: 0.4 K/UL (ref 0.3–1)
MONOCYTES NFR BLD: 8.4 % (ref 4–15)
NEUTROPHILS # BLD AUTO: 3.5 K/UL (ref 1.8–7.7)
NEUTROPHILS NFR BLD: 68.7 % (ref 38–73)
NONHDLC SERPL-MCNC: 74 MG/DL
NRBC BLD-RTO: 0 /100 WBC
PLATELET # BLD AUTO: 177 K/UL (ref 150–450)
PMV BLD AUTO: 9.6 FL (ref 9.2–12.9)
POTASSIUM SERPL-SCNC: 4.4 MMOL/L (ref 3.5–5.1)
PROT SERPL-MCNC: 7.3 G/DL (ref 6–8.4)
RBC # BLD AUTO: 4.26 M/UL (ref 4.6–6.2)
SATURATED IRON: 18 % (ref 20–50)
SODIUM SERPL-SCNC: 139 MMOL/L (ref 136–145)
TOTAL IRON BINDING CAPACITY: 342 UG/DL (ref 250–450)
TRANSFERRIN SERPL-MCNC: 231 MG/DL (ref 200–375)
TRIGL SERPL-MCNC: 132 MG/DL (ref 30–150)
WBC # BLD AUTO: 5.02 K/UL (ref 3.9–12.7)

## 2022-01-12 PROCEDURE — 80053 COMPREHEN METABOLIC PANEL: CPT | Performed by: FAMILY MEDICINE

## 2022-01-12 PROCEDURE — 1159F MED LIST DOCD IN RCRD: CPT | Mod: CPTII,S$GLB,, | Performed by: FAMILY MEDICINE

## 2022-01-12 PROCEDURE — 36415 COLL VENOUS BLD VENIPUNCTURE: CPT | Mod: PN | Performed by: FAMILY MEDICINE

## 2022-01-12 PROCEDURE — 1160F PR REVIEW ALL MEDS BY PRESCRIBER/CLIN PHARMACIST DOCUMENTED: ICD-10-PCS | Mod: CPTII,S$GLB,, | Performed by: FAMILY MEDICINE

## 2022-01-12 PROCEDURE — 99999 PR PBB SHADOW E&M-EST. PATIENT-LVL III: CPT | Mod: PBBFAC,,, | Performed by: FAMILY MEDICINE

## 2022-01-12 PROCEDURE — 99999 PR PBB SHADOW E&M-EST. PATIENT-LVL III: ICD-10-PCS | Mod: PBBFAC,,, | Performed by: FAMILY MEDICINE

## 2022-01-12 PROCEDURE — 80061 LIPID PANEL: CPT | Performed by: FAMILY MEDICINE

## 2022-01-12 PROCEDURE — 85025 COMPLETE CBC W/AUTO DIFF WBC: CPT | Performed by: FAMILY MEDICINE

## 2022-01-12 PROCEDURE — 99213 OFFICE O/P EST LOW 20 MIN: CPT | Mod: S$GLB,,, | Performed by: FAMILY MEDICINE

## 2022-01-12 PROCEDURE — 99213 PR OFFICE/OUTPT VISIT, EST, LEVL III, 20-29 MIN: ICD-10-PCS | Mod: S$GLB,,, | Performed by: FAMILY MEDICINE

## 2022-01-12 PROCEDURE — 1159F PR MEDICATION LIST DOCUMENTED IN MEDICAL RECORD: ICD-10-PCS | Mod: CPTII,S$GLB,, | Performed by: FAMILY MEDICINE

## 2022-01-12 PROCEDURE — 1160F RVW MEDS BY RX/DR IN RCRD: CPT | Mod: CPTII,S$GLB,, | Performed by: FAMILY MEDICINE

## 2022-01-12 PROCEDURE — 84466 ASSAY OF TRANSFERRIN: CPT | Performed by: FAMILY MEDICINE

## 2022-01-12 NOTE — PROGRESS NOTES
"[facility] VISIT      CHIEF COMPLAINT  No chief complaint on file.      HPI  Tremayne Fortune is a 86 y.o. male who presents for physical.     Social Factors  Tobacco use: No  Ready to Quit: No  Alcohol: No  Intimate partner violence screening  "Do you feel safe in your current relationship?" yes  "Have you ever been in a relationship in which your partner frightened you or hurt you?" No  Living Will/POA: No  Regular Exercise: No    Depression  Over the past two weeks, have you felt down, depressed, or hopeless? No  Over the past two weeks, have you felt little interest or pleasure in doing things? No    Reproductive Health  STD screening in last year: deferred  HIV screening: deferred    Screen for Chronic Disease  CHD Risk Factors: advanced age (older than 55 for men, 65 for women), dyslipidemia, hypertension and male gender  Estimated body mass index is 25.73 kg/m² as calculated from the following:    Height as of 7/12/21: 5' 6" (1.676 m).    Weight as of 7/12/21: 72.3 kg (159 lb 6.3 oz).  Dyslipidemia screening needed: order today  T2DM screening needed: today  Colonoscopy needed: n/a  PSA needed: n/a  AAA screening needed:n/a  Screen men 35 years and older, and men 20 to 34 years of age who have cardiovascular risk factors for dyslipidemia  Begin screening colonoscopies at 50 years of age in men of average risk, and continue until 75 years of age; offer fecal occult blood testing every year, flexible sigmoidoscopy every five years combined with fecal occult blood testing every three years, or colonoscopy every 10 years   The American Urological Association recommends offering PSA testing and digital rectal examination to well-informed men beginning at 40 years of age and continuing until life expectancy is less than 10 years  Screen once with ultrasonography in men 65 to 75 years of age if they have a family history or have smoked at [id number] cigarettes in their lifetime  Screen men with a sustained blood " pressure greater than 135/80 mm Hg for T2DM      Immunizations  delayed    ALLERGIES and MEDS were verified.   PMHx, PSHx, FHx, SOCIALHx were updated as pertinent.    REVIEW OF SYSTEMS  Review of Systems   Constitutional: Negative.    HENT: Negative.    Eyes: Negative.    Respiratory: Negative.    Cardiovascular: Negative.    Gastrointestinal: Negative.    Genitourinary: Negative.    Musculoskeletal: Negative.    Skin: Negative.    Neurological: Negative.          PHYSICAL EXAM  VITAL SIGNS: /80   Pulse 66   Resp 16   SpO2 96%   GEN: Well developed, Well nourished, No acute distress.  HENT: Normocephalic, Atraumatic, Bilateral external ears normal, Nose normal, Oropharynx moist, No oral exudates.   Eyes: PERRL, EOMI, Conjunctiva normal, No discharge.   Neck: Supple, No tenderness.  Lymphatic: No cervical or supraclavicular lymphadenopathy noted.   Cardiovascular: Normal heart rate, Normal rhythm, No murmurs, No rubs, No gallops.   Thorax & Lungs: Normal breath sounds, No respiratory distress, No wheezing.  Abdomen: Soft, No tenderness, Bowel sounds normal.  Genital: deferred  Skin: Warm, Dry, No erythema, No rash.   Extremities: No edema, No tenderness.       ASSESSMENT/PLAN    Diagnoses and all orders for this visit:    Visit for [facility] health check  -     CBC Auto Differential; Future  -     Comprehensive Metabolic Panel; Future  -     Iron and TIBC; Future    Essential hypertension    Coronary artery disease involving coronary bypass graft of native heart without angina pectoris    Dyslipidemia  -     Lipid Panel; Future    History of colon cancer    Early dry stage nonexudative age-related macular degeneration of both eyes    Exudative age-related macular degeneration, left eye, with active choroidal neovascularization    Acute anemia  -     Cancel: CBC Auto Differential; Future  -     Cancel: Comprehensive Metabolic Panel; Future  -     Cancel: Iron and TIBC; Future  -     Cancel: CBC Auto  Differential  -     Cancel: Comprehensive Metabolic Panel  -     Cancel: Iron and TIBC  -     CBC Auto Differential; Future  -     Comprehensive Metabolic Panel; Future  -     Iron and TIBC; Future    Senile purpura    Drug-induced polyneuropathy    Coronary artery disease involving coronary bypass graft of native heart with angina pectoris    Peripheral vascular disease         FOLLOW UP: 6 months or sooner if needed  covid vaccines UTD    Mayo Groves MD

## 2022-01-28 ENCOUNTER — TELEPHONE (OUTPATIENT)
Dept: FAMILY MEDICINE | Facility: CLINIC | Age: 87
End: 2022-01-28
Payer: MEDICARE

## 2022-01-28 NOTE — TELEPHONE ENCOUNTER
----- Message from Jody Pineda MA sent at 1/28/2022  2:48 PM CST -----  Can you fax labs to the Dr  ----- Message -----  From: Althea Ash  Sent: 1/28/2022   2:32 PM CST  To: Yaneli Huber Staff    Type: Patient Call Back       What is the request in detail:  office calling requesting pt recent lab work to be faxed.       Can the clinic reply by [facility]? No       Would the patient rather a call back or a response via [facility]? Call back       Best call back number: [phone] fax [phone]      Thank you.

## 2022-02-07 ENCOUNTER — PES CALL (OUTPATIENT)
Dept: ADMINISTRATIVE | Facility: CLINIC | Age: 87
End: 2022-02-07
Payer: MEDICARE

## 2022-03-09 ENCOUNTER — OFFICE VISIT (OUTPATIENT)
Dept: OPHTHALMOLOGY | Facility: CLINIC | Age: 87
End: 2022-03-09
Attending: OPHTHALMOLOGY
Payer: MEDICARE

## 2022-03-09 DIAGNOSIS — H35.3112 NONEXUDATIVE AGE-RELATED MACULAR DEGENERATION, RIGHT EYE, INTERMEDIATE DRY STAGE: Primary | ICD-10-CM

## 2022-03-09 DIAGNOSIS — H35.3222 EXUDATIVE AGE-RELATED MACULAR DEGENERATION, LEFT EYE, WITH INACTIVE CHOROIDAL NEOVASCULARIZATION: ICD-10-CM

## 2022-03-09 DIAGNOSIS — H33.22 RETINAL DETACHMENT, LEFT: ICD-10-CM

## 2022-03-09 PROCEDURE — 92201 PR OPHTHALMOSCOPY, EXT, W/RET DRAW/SCLERAL DEPR, I&R, UNI/BI: ICD-10-PCS | Mod: S$GLB,,, | Performed by: OPHTHALMOLOGY

## 2022-03-09 PROCEDURE — 2023F DILAT RTA XM W/O RTNOPTHY: CPT | Mod: CPTII,S$GLB,, | Performed by: OPHTHALMOLOGY

## 2022-03-09 PROCEDURE — 1126F AMNT PAIN NOTED NONE PRSNT: CPT | Mod: CPTII,S$GLB,, | Performed by: OPHTHALMOLOGY

## 2022-03-09 PROCEDURE — 2023F PR DILATED RETINAL EXAM W/O EVID OF RETINOPATHY: ICD-10-PCS | Mod: CPTII,S$GLB,, | Performed by: OPHTHALMOLOGY

## 2022-03-09 PROCEDURE — 3288F FALL RISK ASSESSMENT DOCD: CPT | Mod: CPTII,S$GLB,, | Performed by: OPHTHALMOLOGY

## 2022-03-09 PROCEDURE — 1160F PR REVIEW ALL MEDS BY PRESCRIBER/CLIN PHARMACIST DOCUMENTED: ICD-10-PCS | Mod: CPTII,S$GLB,, | Performed by: OPHTHALMOLOGY

## 2022-03-09 PROCEDURE — 1126F PR PAIN SEVERITY QUANTIFIED, NO PAIN PRESENT: ICD-10-PCS | Mod: CPTII,S$GLB,, | Performed by: OPHTHALMOLOGY

## 2022-03-09 PROCEDURE — 92012 INTRM OPH EXAM EST PATIENT: CPT | Mod: S$GLB,,, | Performed by: OPHTHALMOLOGY

## 2022-03-09 PROCEDURE — 92134 POSTERIOR SEGMENT OCT RETINA (OCULAR COHERENCE TOMOGRAPHY)-BOTH EYES: ICD-10-PCS | Mod: S$GLB,,, | Performed by: OPHTHALMOLOGY

## 2022-03-09 PROCEDURE — 1101F PT FALLS ASSESS-DOCD LE1/YR: CPT | Mod: CPTII,S$GLB,, | Performed by: OPHTHALMOLOGY

## 2022-03-09 PROCEDURE — 92134 CPTRZ OPH DX IMG PST SGM RTA: CPT | Mod: S$GLB,,, | Performed by: OPHTHALMOLOGY

## 2022-03-09 PROCEDURE — 1159F MED LIST DOCD IN RCRD: CPT | Mod: CPTII,S$GLB,, | Performed by: OPHTHALMOLOGY

## 2022-03-09 PROCEDURE — 92012 PR EYE EXAM, EST PATIENT,INTERMED: ICD-10-PCS | Mod: S$GLB,,, | Performed by: OPHTHALMOLOGY

## 2022-03-09 PROCEDURE — 99999 PR PBB SHADOW E&M-EST. PATIENT-LVL III: ICD-10-PCS | Mod: PBBFAC,,, | Performed by: OPHTHALMOLOGY

## 2022-03-09 PROCEDURE — 1160F RVW MEDS BY RX/DR IN RCRD: CPT | Mod: CPTII,S$GLB,, | Performed by: OPHTHALMOLOGY

## 2022-03-09 PROCEDURE — 99999 PR PBB SHADOW E&M-EST. PATIENT-LVL III: CPT | Mod: PBBFAC,,, | Performed by: OPHTHALMOLOGY

## 2022-03-09 PROCEDURE — 1159F PR MEDICATION LIST DOCUMENTED IN MEDICAL RECORD: ICD-10-PCS | Mod: CPTII,S$GLB,, | Performed by: OPHTHALMOLOGY

## 2022-03-09 PROCEDURE — 1101F PR PT FALLS ASSESS DOC 0-1 FALLS W/OUT INJ PAST YR: ICD-10-PCS | Mod: CPTII,S$GLB,, | Performed by: OPHTHALMOLOGY

## 2022-03-09 PROCEDURE — 92201 OPSCPY EXTND RTA DRAW UNI/BI: CPT | Mod: S$GLB,,, | Performed by: OPHTHALMOLOGY

## 2022-03-09 PROCEDURE — 3288F PR FALLS RISK ASSESSMENT DOCUMENTED: ICD-10-PCS | Mod: CPTII,S$GLB,, | Performed by: OPHTHALMOLOGY

## 2022-03-11 NOTE — PROGRESS NOTES
Subjective:       Patient ID: Tremayne Fortune is a 87 y.o. male      Chief Complaint   Patient presents with    Macular Degeneration     History of Present Illness  HPI     DLS: 128/21    Pt here for 4 month follow up and states no changes since last exam  Va good OD and very poor OS.  No pain    1. Dry ARMD OD  2. Macular Subretinal Heme OS  3. RD OS  4. Wet ARMD with active CNV OD  5, Asteroid Hyalosis OD    Last edited by Alexander Shultz MD on 3/10/2022  8:53 PM. (History)        Imaging:    See report    Assessment/Plan:     1. Nonexudative age-related macular degeneration, right eye, intermediate dry stage  Stable  Discussed Dry and Wet AMD in detail  Recommend AREDS 2 Vitamins  Home Amsler Grid Testing-- given and instructed today  RTC immediately PRN any changes in vision    - Posterior Segment OCT Retina-Both eyes    2. Macular subretinal hemorrhage, left  Resolved since last visit  Given prior submac heme and mac off RD, has very low potential.  Recommend observation    3. Retinal detachment, left  Still with stable inferior traction.  Recommend continued obs  RD precautions    4. Exudative age-related macular degeneration, left eye, with inactive choroidal neovascularization  See #2    5. Asteroid hyalosis, right  Observe     Follow up in about 4 months (around 7/9/2022), or if symptoms worsen or fail to improve, for Comprehensive Examination, OCT Mac.

## 2022-07-13 ENCOUNTER — OFFICE VISIT (OUTPATIENT)
Dept: OPHTHALMOLOGY | Facility: CLINIC | Age: 87
End: 2022-07-13
Attending: OPHTHALMOLOGY
Payer: MEDICARE

## 2022-07-13 DIAGNOSIS — H43.21 ASTEROID HYALOSIS, RIGHT: ICD-10-CM

## 2022-07-13 DIAGNOSIS — H35.3211 EXUDATIVE AGE-RELATED MACULAR DEGENERATION, RIGHT EYE, WITH ACTIVE CHOROIDAL NEOVASCULARIZATION: Primary | ICD-10-CM

## 2022-07-13 DIAGNOSIS — H33.22 RETINAL DETACHMENT, LEFT: ICD-10-CM

## 2022-07-13 DIAGNOSIS — H35.3222 EXUDATIVE AGE-RELATED MACULAR DEGENERATION, LEFT EYE, WITH INACTIVE CHOROIDAL NEOVASCULARIZATION: ICD-10-CM

## 2022-07-13 PROCEDURE — 99214 OFFICE O/P EST MOD 30 MIN: CPT | Mod: 25,S$GLB,, | Performed by: OPHTHALMOLOGY

## 2022-07-13 PROCEDURE — 3288F PR FALLS RISK ASSESSMENT DOCUMENTED: ICD-10-PCS | Mod: CPTII,S$GLB,, | Performed by: OPHTHALMOLOGY

## 2022-07-13 PROCEDURE — 99999 PR PBB SHADOW E&M-EST. PATIENT-LVL III: CPT | Mod: PBBFAC,,, | Performed by: OPHTHALMOLOGY

## 2022-07-13 PROCEDURE — 1159F PR MEDICATION LIST DOCUMENTED IN MEDICAL RECORD: ICD-10-PCS | Mod: CPTII,S$GLB,, | Performed by: OPHTHALMOLOGY

## 2022-07-13 PROCEDURE — 67028 PR INJECT INTRAVITREAL PHARMCOLOGIC: ICD-10-PCS | Mod: RT,S$GLB,, | Performed by: OPHTHALMOLOGY

## 2022-07-13 PROCEDURE — 1101F PR PT FALLS ASSESS DOC 0-1 FALLS W/OUT INJ PAST YR: ICD-10-PCS | Mod: CPTII,S$GLB,, | Performed by: OPHTHALMOLOGY

## 2022-07-13 PROCEDURE — 99499 UNLISTED E&M SERVICE: CPT | Mod: S$GLB,,, | Performed by: OPHTHALMOLOGY

## 2022-07-13 PROCEDURE — 3288F FALL RISK ASSESSMENT DOCD: CPT | Mod: CPTII,S$GLB,, | Performed by: OPHTHALMOLOGY

## 2022-07-13 PROCEDURE — 1101F PT FALLS ASSESS-DOCD LE1/YR: CPT | Mod: CPTII,S$GLB,, | Performed by: OPHTHALMOLOGY

## 2022-07-13 PROCEDURE — 92134 POSTERIOR SEGMENT OCT RETINA (OCULAR COHERENCE TOMOGRAPHY)-BOTH EYES: ICD-10-PCS | Mod: S$GLB,,, | Performed by: OPHTHALMOLOGY

## 2022-07-13 PROCEDURE — 1160F RVW MEDS BY RX/DR IN RCRD: CPT | Mod: CPTII,S$GLB,, | Performed by: OPHTHALMOLOGY

## 2022-07-13 PROCEDURE — 1126F PR PAIN SEVERITY QUANTIFIED, NO PAIN PRESENT: ICD-10-PCS | Mod: CPTII,S$GLB,, | Performed by: OPHTHALMOLOGY

## 2022-07-13 PROCEDURE — 1160F PR REVIEW ALL MEDS BY PRESCRIBER/CLIN PHARMACIST DOCUMENTED: ICD-10-PCS | Mod: CPTII,S$GLB,, | Performed by: OPHTHALMOLOGY

## 2022-07-13 PROCEDURE — 67028 INJECTION EYE DRUG: CPT | Mod: RT,S$GLB,, | Performed by: OPHTHALMOLOGY

## 2022-07-13 PROCEDURE — 99499 RISK ADDL DX/OHS AUDIT: ICD-10-PCS | Mod: S$GLB,,, | Performed by: OPHTHALMOLOGY

## 2022-07-13 PROCEDURE — 99214 PR OFFICE/OUTPT VISIT, EST, LEVL IV, 30-39 MIN: ICD-10-PCS | Mod: 25,S$GLB,, | Performed by: OPHTHALMOLOGY

## 2022-07-13 PROCEDURE — 92134 CPTRZ OPH DX IMG PST SGM RTA: CPT | Mod: S$GLB,,, | Performed by: OPHTHALMOLOGY

## 2022-07-13 PROCEDURE — 1159F MED LIST DOCD IN RCRD: CPT | Mod: CPTII,S$GLB,, | Performed by: OPHTHALMOLOGY

## 2022-07-13 PROCEDURE — 99999 PR PBB SHADOW E&M-EST. PATIENT-LVL III: ICD-10-PCS | Mod: PBBFAC,,, | Performed by: OPHTHALMOLOGY

## 2022-07-13 PROCEDURE — 1126F AMNT PAIN NOTED NONE PRSNT: CPT | Mod: CPTII,S$GLB,, | Performed by: OPHTHALMOLOGY

## 2022-07-13 RX ADMIN — Medication 1.25 MG: at 12:07

## 2022-07-13 NOTE — PATIENT INSTRUCTIONS

## 2022-07-16 NOTE — PROGRESS NOTES
Subjective:       Patient ID: Tremayne Fortune is a 87 y.o. male      Chief Complaint   Patient presents with    spot in vision     History of Present Illness  HPI     Pt here for Comp ex and OCT review    DLS: 03/09/2022 by Dr. CAM Shultz MD      CC: Pt is seeing a dark spot in center of vision OD  just left of fixation   and can only see center of screen .   Vision OS very poor  -eye pain  -diplopia  -headaches  ++floaters/--flashes  --curtain /shadow/veil    Eye Meds: NONE    POHx:   1. Dry ARMD OD  2. Macular Subretinal Heme OS  3. RD OS  4. Wet ARMD with active CNV OD  5, Asteroid Hyalosis OD    Last edited by Alexander Shultz MD on 7/16/2022 11:26 AM. (History)        Imaging:    See report    Assessment/Plan:       1. Exudative age-related macular degeneration, right eye, with active choroidal neovascularization  New diagnosis today  Discussed pathology in detail.  Recommend Avastin OD today.  Discussed RBA inc endophthalmitis and RD discussed in great detail given pt's reluctance because of experience OS.  Discussed close observation is option and R/B  Discussed injection protocol, TREX, and visual expectations  Pt wishes to have injection today    - OCT- Retina  - Prior authorization Ordern    - Posterior Segment OCT Retina-Both eyes    2. Macular subretinal hemorrhage, left  Resolved since last visit  Given prior submac heme and mac off RD, has very low potential.  Recommend observation    3. Retinal detachment, left  Still with stable inferior traction.  Recommend continued obs  RD precautions    4. Exudative age-related macular degeneration, left eye, with inactive choroidal neovascularization  See #2    5. Asteroid hyalosis, right  Observe     Follow up in about 1 month (around 8/13/2022), or if symptoms worsen or fail to improve, for OCT and INJECTION ONLY, Injection Right eye, Avastin.      Patient identified.  Timeout performed.    Risks, benefits, and alternatives to treatment were  discussed in detail with the patient, including bleeding/infection (endophthalmitis)/etc.  The patient voiced understanding and wished to proceed with the procedure.  See separate consent form.    Injection Procedure Note:  Diagnosis: Wet AMD Right Eye    Topical Proparacaine drop placed then topical 5% Betadine  Sterile gloves used, and sterile lid speculum placed.  5% Betadine placed at injection site again prior to injection.  Avastin 1.25mg in 0.05cc Injected inferotemporally 3.5-4mm posterior to the limbus.  Complications: None  Va at least CF at 5 feet post injection.  Retina, ONH, IOP normal after injection.    Followup as above.  Patient should return immediately PRN.  Retinal Detachment and Endophthalmitis precautions given.

## 2022-07-22 ENCOUNTER — TELEPHONE (OUTPATIENT)
Dept: FAMILY MEDICINE | Facility: CLINIC | Age: 87
End: 2022-07-22
Payer: MEDICARE

## 2022-07-25 ENCOUNTER — OFFICE VISIT (OUTPATIENT)
Dept: FAMILY MEDICINE | Facility: CLINIC | Age: 87
End: 2022-07-25
Payer: MEDICARE

## 2022-07-25 VITALS
HEIGHT: 66 IN | BODY MASS INDEX: 25.58 KG/M2 | HEART RATE: 60 BPM | RESPIRATION RATE: 18 BRPM | SYSTOLIC BLOOD PRESSURE: 138 MMHG | DIASTOLIC BLOOD PRESSURE: 62 MMHG | WEIGHT: 159.19 LBS | OXYGEN SATURATION: 98 % | TEMPERATURE: 98 F

## 2022-07-25 DIAGNOSIS — I10 ESSENTIAL HYPERTENSION: Primary | ICD-10-CM

## 2022-07-25 DIAGNOSIS — D69.2 SENILE PURPURA: ICD-10-CM

## 2022-07-25 DIAGNOSIS — I25.810 CORONARY ARTERY DISEASE INVOLVING CORONARY BYPASS GRAFT OF NATIVE HEART WITHOUT ANGINA PECTORIS: ICD-10-CM

## 2022-07-25 DIAGNOSIS — Z23 NEED FOR STREPTOCOCCUS PNEUMONIAE VACCINATION: ICD-10-CM

## 2022-07-25 DIAGNOSIS — Z28.83 IMMUNIZATION NOT ADMINISTERED DUE TO UNAVAILABILITY OF VACCINE: ICD-10-CM

## 2022-07-25 PROCEDURE — 99999 PR PBB SHADOW E&M-EST. PATIENT-LVL V: ICD-10-PCS | Mod: PBBFAC,,, | Performed by: FAMILY MEDICINE

## 2022-07-25 PROCEDURE — 1126F PR PAIN SEVERITY QUANTIFIED, NO PAIN PRESENT: ICD-10-PCS | Mod: CPTII,S$GLB,, | Performed by: FAMILY MEDICINE

## 2022-07-25 PROCEDURE — 90677 PNEUMOCOCCAL CONJUGATE VACCINE 20-VALENT: ICD-10-PCS | Mod: S$GLB,,, | Performed by: FAMILY MEDICINE

## 2022-07-25 PROCEDURE — 1126F AMNT PAIN NOTED NONE PRSNT: CPT | Mod: CPTII,S$GLB,, | Performed by: FAMILY MEDICINE

## 2022-07-25 PROCEDURE — 1101F PR PT FALLS ASSESS DOC 0-1 FALLS W/OUT INJ PAST YR: ICD-10-PCS | Mod: CPTII,S$GLB,, | Performed by: FAMILY MEDICINE

## 2022-07-25 PROCEDURE — G0009 ADMIN PNEUMOCOCCAL VACCINE: HCPCS | Mod: S$GLB,,, | Performed by: FAMILY MEDICINE

## 2022-07-25 PROCEDURE — 1160F PR REVIEW ALL MEDS BY PRESCRIBER/CLIN PHARMACIST DOCUMENTED: ICD-10-PCS | Mod: CPTII,S$GLB,, | Performed by: FAMILY MEDICINE

## 2022-07-25 PROCEDURE — 1160F RVW MEDS BY RX/DR IN RCRD: CPT | Mod: CPTII,S$GLB,, | Performed by: FAMILY MEDICINE

## 2022-07-25 PROCEDURE — G0009 PNEUMOCOCCAL CONJUGATE VACCINE 20-VALENT: ICD-10-PCS | Mod: S$GLB,,, | Performed by: FAMILY MEDICINE

## 2022-07-25 PROCEDURE — 90677 PCV20 VACCINE IM: CPT | Mod: S$GLB,,, | Performed by: FAMILY MEDICINE

## 2022-07-25 PROCEDURE — 99214 PR OFFICE/OUTPT VISIT, EST, LEVL IV, 30-39 MIN: ICD-10-PCS | Mod: S$GLB,,, | Performed by: FAMILY MEDICINE

## 2022-07-25 PROCEDURE — 99214 OFFICE O/P EST MOD 30 MIN: CPT | Mod: S$GLB,,, | Performed by: FAMILY MEDICINE

## 2022-07-25 PROCEDURE — 99999 PR PBB SHADOW E&M-EST. PATIENT-LVL V: CPT | Mod: PBBFAC,,, | Performed by: FAMILY MEDICINE

## 2022-07-25 PROCEDURE — 3288F FALL RISK ASSESSMENT DOCD: CPT | Mod: CPTII,S$GLB,, | Performed by: FAMILY MEDICINE

## 2022-07-25 PROCEDURE — 1101F PT FALLS ASSESS-DOCD LE1/YR: CPT | Mod: CPTII,S$GLB,, | Performed by: FAMILY MEDICINE

## 2022-07-25 PROCEDURE — 1159F MED LIST DOCD IN RCRD: CPT | Mod: CPTII,S$GLB,, | Performed by: FAMILY MEDICINE

## 2022-07-25 PROCEDURE — 3288F PR FALLS RISK ASSESSMENT DOCUMENTED: ICD-10-PCS | Mod: CPTII,S$GLB,, | Performed by: FAMILY MEDICINE

## 2022-07-25 PROCEDURE — 1159F PR MEDICATION LIST DOCUMENTED IN MEDICAL RECORD: ICD-10-PCS | Mod: CPTII,S$GLB,, | Performed by: FAMILY MEDICINE

## 2022-07-25 RX ORDER — BNT162B2 0.23 MG/2.25ML
INJECTION, SUSPENSION INTRAMUSCULAR
COMMUNITY
Start: 2022-05-27 | End: 2022-07-25

## 2022-07-25 NOTE — PROGRESS NOTES
Routine Office Visit    Patient Name: Tremayne Fortune    : 1935  MRN: [id number]    Subjective:  Tremayne is a 87 y.o. male who presents today for:    1. Follow up  Patient presenting today for chronic anemia and htn.  He has been taking all medicaiton as prescribed.  HTN and CAD are managed by Dr. Ash and has been told everything is stable.  There has been no blood in stool or urine.  He does bruise easily, but that is not new.      Past Medical History  Past Medical History:   Diagnosis Date    Colon cancer     Hypertension     Macular degeneration     Total retinal detachment of left eye 2021       Past Surgical History  Past Surgical History:   Procedure Laterality Date    CARDIAC SURGERY      4 vessel CABG    CATARACT EXTRACTION W/  INTRAOCULAR LENS IMPLANT Right 2016    Dr. Delgado    CATARACT EXTRACTION W/  INTRAOCULAR LENS IMPLANT Left 2016    Dr. Delgado     CHOLECYSTECTOMY      COLON SURGERY      colon resection    REPAIR OF RETINAL DETACHMENT WITH VITRECTOMY Left 2021    Procedure: REPAIR, RETINAL DETACHMENT, WITH VITRECTOMY/SCERAL BUCKLE/ LEFT EYE;  Surgeon: Alexander Shultz MD;  Location: [address];  Service: Ophthalmology;  Laterality: Left;    SCLERAL BUCKLING Left 2021    Procedure: SCLERAL BUCKLING;  Surgeon: Alexander Shultz MD;  Location: [facility] OR [address] [place] [address];  Service: Ophthalmology;  Laterality: Left;    VITRECTOMY BY PARS PLANA APPROACH Left 12/15/2020    Procedure: VITRECTOMY, PARS PLANA APPROACH, injection of subretinal tpa, injection of gas, left eye;  Surgeon: Alexander Shultz MD;  Location: [address];  Service: Ophthalmology;  Laterality: Left;  pt can't arrive until 9 or 10am       Family History  Family History   Problem Relation Age of Onset    No Known Problems Mother     No Known Problems Father     No Known Problems Sister     No Known Problems Brother     No Known Problems Maternal Aunt     No Known  Problems Maternal Uncle     No Known Problems Paternal Aunt     No Known Problems Paternal Uncle     No Known Problems Maternal Grandmother     No Known Problems Maternal Grandfather     No Known Problems Paternal Grandmother     No Known Problems Paternal Grandfather     Amblyopia Neg Hx     Blindness Neg Hx     Cancer Neg Hx     Cataracts Neg Hx     Diabetes Neg Hx     Glaucoma Neg Hx     Hypertension Neg Hx     Macular degeneration Neg Hx     Retinal detachment Neg Hx     Strabismus Neg Hx     Stroke Neg Hx     Thyroid disease Neg Hx        Social History  Social History     Socioeconomic History    Marital status:     Number of children: 2   Occupational History    Occupation: retired   Tobacco Use    Smoking status: Former Smoker     Quit date: 1996     Years since quittin.0    Smokeless tobacco: Never Used   Substance and Sexual Activity    Alcohol use: No    Drug use: No    Sexual activity: Not Currently     Partners: Female       Current Medications  Current Outpatient Medications on File Prior to Visit   Medication Sig Dispense Refill    acetaminophen (TYLENOL) 325 MG tablet Take 1 tablet (325 mg total) by mouth every 6 (six) hours as needed for Pain.  0    amLODIPine (NORVASC) 5 MG tablet Take 5 mg by mouth nightly.       aspirin (ECOTRIN) 325 MG EC tablet Take 325 mg by mouth once daily.      colestipol (COLESTID) 1 gram Tab 1 g 2 (two) times daily.       ezetimibe (ZETIA) 10 mg tablet Take 10 mg by mouth once daily.      fish oil-omega-3 fatty acids 300-1,000 mg capsule Take 1 capsule by mouth 2 (two) times a day.       isosorbide mononitrate (IMDUR) 30 MG 24 hr tablet Take 30 mg by mouth once daily.      losartan (COZAAR) 50 MG tablet Take 50 mg by mouth once daily.  1    metoprolol succinate (TOPROL-XL) 50 MG 24 hr tablet 2 (two) times daily.       multivitamin capsule Take 1 capsule by mouth once daily.      psyllium husk 0.4 gram Cap Take 1  "capsule by mouth 4 (four) times daily.      rosuvastatin (CRESTOR) 5 MG tablet Take 5 mg by mouth every evening.       vit C/E/Zn/coppr/lutein/zeaxan (PRESERVISION AREDS-2 ORAL) Take by mouth.      [DISCONTINUED] PFIZER COVID-19 GLORIA VACCN,PF, 30 mcg/0.3 mL injection        Current Facility-Administered Medications on File Prior to Visit   Medication Dose Route Frequency Provider Last Rate Last Admin    atropine 1% ophthalmic solution 1 drop  1 drop Left Eye On Call Procedure Maulik Mays MD   1 drop at 12/15/20 1040    moxifloxacin 0.5 % ophthalmic solution 1 drop  1 drop Left Eye On Call Procedure Maulik Mays MD   1 drop at 12/15/20 1039    phenylephrine HCL 2.5% ophthalmic solution 1 drop  1 drop Left Eye On Call Procedure Maulik Mays MD   1 drop at 12/15/20 1040    prednisoLONE acetate 1 % ophthalmic suspension 1 drop  1 drop Left Eye On Call Procedure Maulik Mays MD   1 drop at 12/15/20 1040    tetracaine HCl (PF) 0.5 % Drop 1 drop  1 drop Left Eye On Call Procedure Maulik Mays MD   1 drop at 12/15/20 1020    tobramycin-dexamethasone 0.3-0.1% ophthalmic ointment   Left Eye On Call Procedure Maulik Mays MD        tropicamide 1% ophthalmic solution 1 drop  1 drop Left Eye On Call Procedure Maulik Mays MD   1 drop at 12/15/20 1039       Allergies   Review of patient's allergies indicates:   Allergen Reactions    Shellfish containing products Hives and Nausea And Vomiting              Review of Systems (Pertinent positives)  Review of Systems   Constitutional: Positive for malaise/fatigue.   HENT: Negative.    Eyes: Negative.    Respiratory: Negative.    Cardiovascular: Negative.    Gastrointestinal: Negative.    Musculoskeletal: Positive for joint pain and myalgias.   Skin: Negative.    Neurological: Negative.          /62   Pulse 60   Temp 97.7 °F (36.5 °C) (Oral)   Resp 18   Ht 5' 6" (1.676 m)   Wt 72.2 kg (159 lb 2.8 oz)   SpO2 98%   BMI 25.69 " kg/m²     GENERAL APPEARANCE: in no apparent distress and well developed and well nourished  HEENT: PERRL, EOMI, Sclera clear, anicteric, Oropharynx clear, no lesions, Neck supple with midline trachea  NECK: normal, supple, no adenopathy, thyroid normal in size  RESPIRATORY: appears well, vitals normal, no respiratory distress, acyanotic, normal RR, chest clear, no wheezing, crepitations, rhonchi, normal symmetric air entry  HEART: regular rate and rhythm, S1, S2 normal, no murmur, click, rub or gallop.    ABDOMEN: abdomen is soft without tenderness, no masses, no hernias, no organomegaly, no rebound, no guarding.   Extremities: warm/well perfused.  No abnormal hair patterns.  No clubbing, cyanosis or edema.   SKIN: bruising to right arm noted  PSYCH: Alert, oriented x 3, thought content appropriate, speech normal, pleasant and cooperative, good eye contact, well groomed    Assessment/Plan:  Tremayne Fortune is a 87 y.o. male who presents today for :    Tremayne was seen today for follow-up and hypertension.    Diagnoses and all orders for this visit:    Essential hypertension  -     Comprehensive Metabolic Panel; Future    Coronary artery disease involving coronary bypass graft of native heart without angina pectoris  -     Comprehensive Metabolic Panel; Future    Senile purpura  -     CBC Auto Differential; Future    Immunization not administered due to unavailability of vaccine  -     (In Office Administered) Pneumococcal Conjugate Vaccine (20 Valent) (IM)        1.  Labs today  2.  htn stable  3.  Follow up with cardiology as scheduled  4.  pneumo 20 given  5.  Follow up 6 months or sooner if needed    Mayo Groves MD

## 2022-07-29 ENCOUNTER — LAB VISIT (OUTPATIENT)
Dept: LAB | Facility: HOSPITAL | Age: 87
End: 2022-07-29
Attending: FAMILY MEDICINE
Payer: MEDICARE

## 2022-07-29 DIAGNOSIS — I25.810 CORONARY ARTERY DISEASE INVOLVING CORONARY BYPASS GRAFT OF NATIVE HEART WITHOUT ANGINA PECTORIS: ICD-10-CM

## 2022-07-29 DIAGNOSIS — D69.2 SENILE PURPURA: ICD-10-CM

## 2022-07-29 DIAGNOSIS — I10 ESSENTIAL HYPERTENSION: ICD-10-CM

## 2022-07-29 LAB
ALBUMIN SERPL BCP-MCNC: 3.8 G/DL (ref 3.5–5.2)
ALP SERPL-CCNC: 101 U/L (ref 55–135)
ALT SERPL W/O P-5'-P-CCNC: 22 U/L (ref 10–44)
ANION GAP SERPL CALC-SCNC: 10 MMOL/L (ref 8–16)
AST SERPL-CCNC: 25 U/L (ref 10–40)
BASOPHILS # BLD AUTO: 0.02 K/UL (ref 0–0.2)
BASOPHILS NFR BLD: 0.4 % (ref 0–1.9)
BILIRUB SERPL-MCNC: 0.6 MG/DL (ref 0.1–1)
BUN SERPL-MCNC: 9 MG/DL (ref 8–23)
CALCIUM SERPL-MCNC: 9.4 MG/DL (ref 8.7–10.5)
CHLORIDE SERPL-SCNC: 102 MMOL/L (ref 95–110)
CO2 SERPL-SCNC: 24 MMOL/L (ref 23–29)
CREAT SERPL-MCNC: 0.8 MG/DL (ref 0.5–1.4)
DIFFERENTIAL METHOD: ABNORMAL
EOSINOPHIL # BLD AUTO: 0.1 K/UL (ref 0–0.5)
EOSINOPHIL NFR BLD: 2.6 % (ref 0–8)
ERYTHROCYTE [DISTWIDTH] IN BLOOD BY AUTOMATED COUNT: 14.1 % (ref 11.5–14.5)
EST. GFR  (AFRICAN AMERICAN): >60 ML/MIN/1.73 M^2
EST. GFR  (NON AFRICAN AMERICAN): >60 ML/MIN/1.73 M^2
GLUCOSE SERPL-MCNC: 83 MG/DL (ref 70–110)
HCT VFR BLD AUTO: 38.1 % (ref 40–54)
HGB BLD-MCNC: 12.4 G/DL (ref 14–18)
IMM GRANULOCYTES # BLD AUTO: 0.02 K/UL (ref 0–0.04)
IMM GRANULOCYTES NFR BLD AUTO: 0.4 % (ref 0–0.5)
LYMPHOCYTES # BLD AUTO: 1 K/UL (ref 1–4.8)
LYMPHOCYTES NFR BLD: 18.1 % (ref 18–48)
MCH RBC QN AUTO: 31.2 PG (ref 27–31)
MCHC RBC AUTO-ENTMCNC: 32.5 G/DL (ref 32–36)
MCV RBC AUTO: 96 FL (ref 82–98)
MONOCYTES # BLD AUTO: 0.5 K/UL (ref 0.3–1)
MONOCYTES NFR BLD: 9.3 % (ref 4–15)
NEUTROPHILS # BLD AUTO: 3.7 K/UL (ref 1.8–7.7)
NEUTROPHILS NFR BLD: 69.2 % (ref 38–73)
NRBC BLD-RTO: 0 /100 WBC
PLATELET # BLD AUTO: 196 K/UL (ref 150–450)
PMV BLD AUTO: 9.5 FL (ref 9.2–12.9)
POTASSIUM SERPL-SCNC: 4.6 MMOL/L (ref 3.5–5.1)
PROT SERPL-MCNC: 7.2 G/DL (ref 6–8.4)
RBC # BLD AUTO: 3.97 M/UL (ref 4.6–6.2)
SODIUM SERPL-SCNC: 136 MMOL/L (ref 136–145)
WBC # BLD AUTO: 5.29 K/UL (ref 3.9–12.7)

## 2022-07-29 PROCEDURE — 85025 COMPLETE CBC W/AUTO DIFF WBC: CPT | Performed by: FAMILY MEDICINE

## 2022-07-29 PROCEDURE — 80053 COMPREHEN METABOLIC PANEL: CPT | Performed by: FAMILY MEDICINE

## 2022-07-29 PROCEDURE — 36415 COLL VENOUS BLD VENIPUNCTURE: CPT | Mod: PO | Performed by: FAMILY MEDICINE

## 2022-08-02 ENCOUNTER — TELEPHONE (OUTPATIENT)
Dept: FAMILY MEDICINE | Facility: CLINIC | Age: 87
End: 2022-08-02
Payer: MEDICARE

## 2022-08-02 DIAGNOSIS — D64.9 ACUTE ANEMIA: Primary | ICD-10-CM

## 2022-08-02 NOTE — TELEPHONE ENCOUNTER
Pt notified of Doctor's instructions below. Pt verbalized understanding. Lab appointment scheduled as ordered.

## 2022-08-02 NOTE — TELEPHONE ENCOUNTER
----- Message from Mayo Groves MD sent at 8/2/2022  9:13 AM CDT -----  Please let patient know that his labs are back and he has become slightly more anemic.  Please check to see if he has seen blood in urine or stool    Thanks  Dr. Groves

## 2022-08-02 NOTE — TELEPHONE ENCOUNTER
Pt notified of results as stated by MD. Pt states he has not noticed any blood in his urine or stool.

## 2022-08-02 NOTE — PROGRESS NOTES
Please let patient know that his labs are back and he has become slightly more anemic.  Please check to see if he has seen blood in urine or stool    Thanks  Dr. Groves

## 2022-08-02 NOTE — TELEPHONE ENCOUNTER
Please schedule patient to repeat labs next week to make sure his anemia is staying stable    Thanks  Dr. Groves

## 2022-08-09 ENCOUNTER — LAB VISIT (OUTPATIENT)
Dept: LAB | Facility: HOSPITAL | Age: 87
End: 2022-08-09
Attending: FAMILY MEDICINE
Payer: MEDICARE

## 2022-08-09 DIAGNOSIS — D64.9 ACUTE ANEMIA: ICD-10-CM

## 2022-08-09 LAB
BASOPHILS # BLD AUTO: 0.03 K/UL (ref 0–0.2)
BASOPHILS NFR BLD: 0.5 % (ref 0–1.9)
DIFFERENTIAL METHOD: ABNORMAL
EOSINOPHIL # BLD AUTO: 0.1 K/UL (ref 0–0.5)
EOSINOPHIL NFR BLD: 2.6 % (ref 0–8)
ERYTHROCYTE [DISTWIDTH] IN BLOOD BY AUTOMATED COUNT: 14.3 % (ref 11.5–14.5)
HCT VFR BLD AUTO: 38.2 % (ref 40–54)
HGB BLD-MCNC: 12.4 G/DL (ref 14–18)
IMM GRANULOCYTES # BLD AUTO: 0.01 K/UL (ref 0–0.04)
IMM GRANULOCYTES NFR BLD AUTO: 0.2 % (ref 0–0.5)
IRON SERPL-MCNC: 76 UG/DL (ref 45–160)
LYMPHOCYTES # BLD AUTO: 1.2 K/UL (ref 1–4.8)
LYMPHOCYTES NFR BLD: 22.3 % (ref 18–48)
MCH RBC QN AUTO: 30.8 PG (ref 27–31)
MCHC RBC AUTO-ENTMCNC: 32.5 G/DL (ref 32–36)
MCV RBC AUTO: 95 FL (ref 82–98)
MONOCYTES # BLD AUTO: 0.5 K/UL (ref 0.3–1)
MONOCYTES NFR BLD: 9.9 % (ref 4–15)
NEUTROPHILS # BLD AUTO: 3.5 K/UL (ref 1.8–7.7)
NEUTROPHILS NFR BLD: 64.5 % (ref 38–73)
NRBC BLD-RTO: 0 /100 WBC
PLATELET # BLD AUTO: 191 K/UL (ref 150–450)
PMV BLD AUTO: 9.5 FL (ref 9.2–12.9)
RBC # BLD AUTO: 4.03 M/UL (ref 4.6–6.2)
SATURATED IRON: 21 % (ref 20–50)
TOTAL IRON BINDING CAPACITY: 363 UG/DL (ref 250–450)
TRANSFERRIN SERPL-MCNC: 245 MG/DL (ref 200–375)
WBC # BLD AUTO: 5.46 K/UL (ref 3.9–12.7)

## 2022-08-09 PROCEDURE — 36415 COLL VENOUS BLD VENIPUNCTURE: CPT | Mod: PN | Performed by: FAMILY MEDICINE

## 2022-08-09 PROCEDURE — 85025 COMPLETE CBC W/AUTO DIFF WBC: CPT | Performed by: FAMILY MEDICINE

## 2022-08-09 PROCEDURE — 84466 ASSAY OF TRANSFERRIN: CPT | Performed by: FAMILY MEDICINE

## 2022-08-17 ENCOUNTER — PROCEDURE VISIT (OUTPATIENT)
Dept: OPHTHALMOLOGY | Facility: CLINIC | Age: 87
End: 2022-08-17
Attending: OPHTHALMOLOGY
Payer: MEDICARE

## 2022-08-17 DIAGNOSIS — H35.3211 EXUDATIVE AGE-RELATED MACULAR DEGENERATION, RIGHT EYE, WITH ACTIVE CHOROIDAL NEOVASCULARIZATION: Primary | ICD-10-CM

## 2022-08-17 PROCEDURE — 99499 NO LOS: ICD-10-PCS | Mod: S$GLB,,, | Performed by: OPHTHALMOLOGY

## 2022-08-17 PROCEDURE — 67028 INJECTION EYE DRUG: CPT | Mod: RT,S$GLB,, | Performed by: OPHTHALMOLOGY

## 2022-08-17 PROCEDURE — 92134 CPTRZ OPH DX IMG PST SGM RTA: CPT | Mod: S$GLB,,, | Performed by: OPHTHALMOLOGY

## 2022-08-17 PROCEDURE — 92134 POSTERIOR SEGMENT OCT RETINA (OCULAR COHERENCE TOMOGRAPHY)-BOTH EYES: ICD-10-PCS | Mod: S$GLB,,, | Performed by: OPHTHALMOLOGY

## 2022-08-17 PROCEDURE — 99499 UNLISTED E&M SERVICE: CPT | Mod: S$GLB,,, | Performed by: OPHTHALMOLOGY

## 2022-08-17 PROCEDURE — 67028 PR INJECT INTRAVITREAL PHARMCOLOGIC: ICD-10-PCS | Mod: RT,S$GLB,, | Performed by: OPHTHALMOLOGY

## 2022-08-17 RX ADMIN — Medication 1.25 MG: at 11:08

## 2022-08-17 NOTE — PATIENT INSTRUCTIONS

## 2022-08-23 NOTE — PROGRESS NOTES
Subjective:       Patient ID: Tremayne Fortune is a 87 y.o. male      Chief Complaint   Patient presents with    Injections     History of Present Illness  HPI     DLS: 07/13/2022 by Dr. CAM Shultz MD    CC: Pt states vision is still blurry OS  Spot in vision OD is improved    -eye pain  -diplopia  -headaches  ++floaters/--flashes  --curtain /shadow/veil    Eye Meds: NONE    POHx:   1. Dry ARMD OD  2. Macular Subretinal Heme OS  3. RD OS    4. Wet ARMD with active CNV OD    S.P Avastin OD ( 07/13/2022)    5, Asteroid Hyalosis OD    Last edited by Alexander Shultz MD on 8/23/2022  3:12 PM. (History)        Imaging:    See report    Assessment/Plan:       1. Exudative age-related macular degeneration, right eye, with active choroidal neovascularization  Doing well 5 wks after Av #1  Rec Av OD today and TREX to 6 wks    - OCT- Retina  - Prior authorization Ordern    - Posterior Segment OCT Retina-Both eyes    Follow up in about 6 weeks (around 9/28/2022), or if symptoms worsen or fail to improve, for OCT and INJECTION ONLY, Injection Right eye, Avastin.      Patient identified.  Timeout performed.    Risks, benefits, and alternatives to treatment were discussed in detail with the patient, including bleeding/infection (endophthalmitis)/etc.  The patient voiced understanding and wished to proceed with the procedure.  See separate consent form.    Injection Procedure Note:  Diagnosis: Wet AMD Right Eye    Topical Proparacaine drop placed then topical 5% Betadine  Sterile gloves used, and sterile lid speculum placed.  5% Betadine placed at injection site again prior to injection.  Avastin 1.25mg in 0.05cc Injected inferotemporally 3.5-4mm posterior to the limbus.  Complications: None  Va at least CF at 5 feet post injection.  Retina, ONH, IOP normal after injection.    Followup as above.  Patient should return immediately PRN.  Retinal Detachment and Endophthalmitis precautions given.

## 2022-09-28 ENCOUNTER — PROCEDURE VISIT (OUTPATIENT)
Dept: OPHTHALMOLOGY | Facility: CLINIC | Age: 87
End: 2022-09-28
Attending: OPHTHALMOLOGY
Payer: MEDICARE

## 2022-09-28 DIAGNOSIS — H35.3211 EXUDATIVE AGE-RELATED MACULAR DEGENERATION, RIGHT EYE, WITH ACTIVE CHOROIDAL NEOVASCULARIZATION: Primary | ICD-10-CM

## 2022-09-28 PROCEDURE — 99499 NO LOS: ICD-10-PCS | Mod: S$GLB,,, | Performed by: OPHTHALMOLOGY

## 2022-09-28 PROCEDURE — 67028 INJECTION EYE DRUG: CPT | Mod: RT,S$GLB,, | Performed by: OPHTHALMOLOGY

## 2022-09-28 PROCEDURE — 99499 UNLISTED E&M SERVICE: CPT | Mod: S$GLB,,, | Performed by: OPHTHALMOLOGY

## 2022-09-28 PROCEDURE — 92134 POSTERIOR SEGMENT OCT RETINA (OCULAR COHERENCE TOMOGRAPHY)-BOTH EYES: ICD-10-PCS | Mod: S$GLB,,, | Performed by: OPHTHALMOLOGY

## 2022-09-28 PROCEDURE — 92134 CPTRZ OPH DX IMG PST SGM RTA: CPT | Mod: S$GLB,,, | Performed by: OPHTHALMOLOGY

## 2022-09-28 PROCEDURE — 67028 PR INJECT INTRAVITREAL PHARMCOLOGIC: ICD-10-PCS | Mod: RT,S$GLB,, | Performed by: OPHTHALMOLOGY

## 2022-09-28 RX ADMIN — Medication 1.25 MG: at 11:09

## 2022-09-28 NOTE — PROGRESS NOTES
Subjective:       Patient ID: Tremayne Fortune is a 87 y.o. male      Chief Complaint   Patient presents with    Follow-up     History of Present Illness  HPI    6 wk OCT/SWATHI OD only    Vision is same OD, good.  No pain/f/f  Last edited by Alexander Shultz MD on 9/28/2022 11:24 AM.        Imaging:    See report    Assessment/Plan:       1. Exudative age-related macular degeneration, right eye, with active choroidal neovascularization  Doing well 6 wks after Av #2  Rec Av OD today and TREX to 8 wks    - OCT- Retina      Follow up in about 8 weeks (around 11/23/2022), or if symptoms worsen or fail to improve, for OCT and INJECTION ONLY, Injection Right eye, Avastin.      Patient identified.  Timeout performed.    Risks, benefits, and alternatives to treatment were discussed in detail with the patient, including bleeding/infection (endophthalmitis)/etc.  The patient voiced understanding and wished to proceed with the procedure.  See separate consent form.    Injection Procedure Note:  Diagnosis: Wet AMD Right Eye    Topical Proparacaine drop placed then topical 5% Betadine  Sterile gloves used, and sterile lid speculum placed.  5% Betadine placed at injection site again prior to injection.  Avastin 1.25mg in 0.05cc Injected inferotemporally 3.5-4mm posterior to the limbus.  Complications: None  Va at least CF at 5 feet post injection.  Retina, ONH, IOP normal after injection.    Followup as above.  Patient should return immediately PRN.  Retinal Detachment and Endophthalmitis precautions given.

## 2022-10-03 NOTE — PATIENT INSTRUCTIONS

## 2022-10-17 ENCOUNTER — PES CALL (OUTPATIENT)
Dept: ADMINISTRATIVE | Facility: CLINIC | Age: 87
End: 2022-10-17
Payer: MEDICARE

## 2022-11-23 ENCOUNTER — PROCEDURE VISIT (OUTPATIENT)
Dept: OPHTHALMOLOGY | Facility: CLINIC | Age: 87
End: 2022-11-23
Attending: OPHTHALMOLOGY
Payer: MEDICARE

## 2022-11-23 DIAGNOSIS — H35.3211 EXUDATIVE AGE-RELATED MACULAR DEGENERATION, RIGHT EYE, WITH ACTIVE CHOROIDAL NEOVASCULARIZATION: Primary | ICD-10-CM

## 2022-11-23 PROCEDURE — 92134 CPTRZ OPH DX IMG PST SGM RTA: CPT | Mod: S$GLB,,, | Performed by: OPHTHALMOLOGY

## 2022-11-23 PROCEDURE — 92134 POSTERIOR SEGMENT OCT RETINA (OCULAR COHERENCE TOMOGRAPHY)-BOTH EYES: ICD-10-PCS | Mod: S$GLB,,, | Performed by: OPHTHALMOLOGY

## 2022-11-23 PROCEDURE — 67028 PR INJECT INTRAVITREAL PHARMCOLOGIC: ICD-10-PCS | Mod: RT,S$GLB,, | Performed by: OPHTHALMOLOGY

## 2022-11-23 PROCEDURE — 67028 INJECTION EYE DRUG: CPT | Mod: RT,S$GLB,, | Performed by: OPHTHALMOLOGY

## 2022-11-23 PROCEDURE — 99499 UNLISTED E&M SERVICE: CPT | Mod: S$GLB,,, | Performed by: OPHTHALMOLOGY

## 2022-11-23 PROCEDURE — 99499 NO LOS: ICD-10-PCS | Mod: S$GLB,,, | Performed by: OPHTHALMOLOGY

## 2022-11-23 RX ADMIN — Medication 1.25 MG: at 11:11

## 2022-11-23 NOTE — PROGRESS NOTES
Subjective:       Patient ID: Tremayne Fortune is a 87 y.o. male      Chief Complaint   Patient presents with    Procedure     Avastin OD     History of Present Illness  HPI     Procedure     Additional comments: Avastin OD           Comments    DLS: 9/28/22    Pt here for 8 week follow up and Avastin OD  Pt states no changes or vision complaints since last exam      1. Wet ARMD with active CNV OD  -S/p Avastin OD (9/28/22)    2. Macular Subretinal Heme OS    3. RD OS    4. Wet ARMD with inactive CNV OS    5. Asteroid Hyalosis OD          Last edited by Alexander Shultz MD on 11/23/2022 11:15 AM.        Imaging:    See report    Assessment/Plan:       1. Exudative age-related macular degeneration, right eye, with active choroidal neovascularization  Doing well 8 wks after Av #2  Rec Av OD today and TREX to 10 wks    - OCT- Retina      Follow up in about 10 weeks (around 2/1/2023), or if symptoms worsen or fail to improve, for OCT and INJECTION ONLY, Injection Right eye, Avastin.      Patient identified.  Timeout performed.    Risks, benefits, and alternatives to treatment were discussed in detail with the patient, including bleeding/infection (endophthalmitis)/etc.  The patient voiced understanding and wished to proceed with the procedure.  See separate consent form.    Injection Procedure Note:  Diagnosis: Wet AMD Right Eye    Topical Proparacaine drop placed then topical 5% Betadine  Sterile gloves used, and sterile lid speculum placed.  5% Betadine placed at injection site again prior to injection.  Avastin 1.25mg in 0.05cc Injected inferotemporally 3.5-4mm posterior to the limbus.  Complications: None  Va at least CF at 5 feet post injection.  Retina, ONH, IOP normal after injection.    Followup as above.  Patient should return immediately PRN.  Retinal Detachment and Endophthalmitis precautions given.

## 2023-01-25 ENCOUNTER — TELEPHONE (OUTPATIENT)
Dept: FAMILY MEDICINE | Facility: CLINIC | Age: 88
End: 2023-01-25
Payer: MEDICARE

## 2023-01-26 ENCOUNTER — OFFICE VISIT (OUTPATIENT)
Dept: FAMILY MEDICINE | Facility: CLINIC | Age: 88
End: 2023-01-26
Payer: MEDICARE

## 2023-01-26 VITALS
TEMPERATURE: 98 F | BODY MASS INDEX: 25.97 KG/M2 | HEIGHT: 66 IN | RESPIRATION RATE: 18 BRPM | OXYGEN SATURATION: 98 % | SYSTOLIC BLOOD PRESSURE: 132 MMHG | WEIGHT: 161.63 LBS | DIASTOLIC BLOOD PRESSURE: 62 MMHG | HEART RATE: 59 BPM

## 2023-01-26 DIAGNOSIS — I73.9 PERIPHERAL VASCULAR DISEASE: ICD-10-CM

## 2023-01-26 DIAGNOSIS — E78.5 DYSLIPIDEMIA: ICD-10-CM

## 2023-01-26 DIAGNOSIS — Z85.038 HISTORY OF COLON CANCER: ICD-10-CM

## 2023-01-26 DIAGNOSIS — Z00.00 VISIT FOR WELL MAN HEALTH CHECK: Primary | ICD-10-CM

## 2023-01-26 DIAGNOSIS — I10 ESSENTIAL HYPERTENSION: ICD-10-CM

## 2023-01-26 DIAGNOSIS — I25.810 CORONARY ARTERY DISEASE INVOLVING CORONARY BYPASS GRAFT OF NATIVE HEART WITHOUT ANGINA PECTORIS: ICD-10-CM

## 2023-01-26 DIAGNOSIS — I25.709 CORONARY ARTERY DISEASE INVOLVING CORONARY BYPASS GRAFT OF NATIVE HEART WITH ANGINA PECTORIS: ICD-10-CM

## 2023-01-26 PROCEDURE — 1159F PR MEDICATION LIST DOCUMENTED IN MEDICAL RECORD: ICD-10-PCS | Mod: CPTII,S$GLB,, | Performed by: FAMILY MEDICINE

## 2023-01-26 PROCEDURE — 1101F PR PT FALLS ASSESS DOC 0-1 FALLS W/OUT INJ PAST YR: ICD-10-PCS | Mod: CPTII,S$GLB,, | Performed by: FAMILY MEDICINE

## 2023-01-26 PROCEDURE — 99499 RISK ADDL DX/OHS AUDIT: ICD-10-PCS | Mod: S$GLB,,, | Performed by: FAMILY MEDICINE

## 2023-01-26 PROCEDURE — 1160F PR REVIEW ALL MEDS BY PRESCRIBER/CLIN PHARMACIST DOCUMENTED: ICD-10-PCS | Mod: CPTII,S$GLB,, | Performed by: FAMILY MEDICINE

## 2023-01-26 PROCEDURE — 3288F PR FALLS RISK ASSESSMENT DOCUMENTED: ICD-10-PCS | Mod: CPTII,S$GLB,, | Performed by: FAMILY MEDICINE

## 2023-01-26 PROCEDURE — 99397 PER PM REEVAL EST PAT 65+ YR: CPT | Mod: GZ,S$GLB,, | Performed by: FAMILY MEDICINE

## 2023-01-26 PROCEDURE — 99397 PR PREVENTIVE VISIT,EST,65 & OVER: ICD-10-PCS | Mod: GZ,S$GLB,, | Performed by: FAMILY MEDICINE

## 2023-01-26 PROCEDURE — 1126F AMNT PAIN NOTED NONE PRSNT: CPT | Mod: CPTII,S$GLB,, | Performed by: FAMILY MEDICINE

## 2023-01-26 PROCEDURE — 99999 PR PBB SHADOW E&M-EST. PATIENT-LVL V: CPT | Mod: PBBFAC,,, | Performed by: FAMILY MEDICINE

## 2023-01-26 PROCEDURE — 1159F MED LIST DOCD IN RCRD: CPT | Mod: CPTII,S$GLB,, | Performed by: FAMILY MEDICINE

## 2023-01-26 PROCEDURE — 1160F RVW MEDS BY RX/DR IN RCRD: CPT | Mod: CPTII,S$GLB,, | Performed by: FAMILY MEDICINE

## 2023-01-26 PROCEDURE — 1126F PR PAIN SEVERITY QUANTIFIED, NO PAIN PRESENT: ICD-10-PCS | Mod: CPTII,S$GLB,, | Performed by: FAMILY MEDICINE

## 2023-01-26 PROCEDURE — 99499 UNLISTED E&M SERVICE: CPT | Mod: S$GLB,,, | Performed by: FAMILY MEDICINE

## 2023-01-26 PROCEDURE — 99999 PR PBB SHADOW E&M-EST. PATIENT-LVL V: ICD-10-PCS | Mod: PBBFAC,,, | Performed by: FAMILY MEDICINE

## 2023-01-26 PROCEDURE — 3288F FALL RISK ASSESSMENT DOCD: CPT | Mod: CPTII,S$GLB,, | Performed by: FAMILY MEDICINE

## 2023-01-26 PROCEDURE — 1101F PT FALLS ASSESS-DOCD LE1/YR: CPT | Mod: CPTII,S$GLB,, | Performed by: FAMILY MEDICINE

## 2023-01-26 RX ORDER — EZETIMIBE 10 MG/1
10 TABLET ORAL DAILY
Status: CANCELLED | OUTPATIENT
Start: 2023-01-26

## 2023-01-26 RX ORDER — LOSARTAN POTASSIUM 50 MG/1
50 TABLET ORAL DAILY
Refills: 1 | Status: CANCELLED | OUTPATIENT
Start: 2023-01-26

## 2023-01-26 RX ORDER — AMLODIPINE BESYLATE 5 MG/1
5 TABLET ORAL NIGHTLY
Status: CANCELLED | OUTPATIENT
Start: 2023-01-26

## 2023-01-26 RX ORDER — ROSUVASTATIN CALCIUM 5 MG/1
5 TABLET, COATED ORAL NIGHTLY
Status: CANCELLED | OUTPATIENT
Start: 2023-01-26

## 2023-01-26 RX ORDER — METOPROLOL SUCCINATE 50 MG/1
50 TABLET, EXTENDED RELEASE ORAL 2 TIMES DAILY
Status: CANCELLED | OUTPATIENT
Start: 2023-01-26

## 2023-01-26 NOTE — PROGRESS NOTES
"Well Man VISIT      CHIEF COMPLAINT  Chief Complaint   Patient presents with    Follow-up       HPI  Tremayne Fortune is a 87 y.o. male who presents for physical.     Social Factors  Tobacco use: No  Ready to Quit: No  Alcohol: No  Intimate partner violence screening  "Do you feel safe in your current relationship?" yes  "Have you ever been in a relationship in which your partner frightened you or hurt you?" No  Living Will/POA: No  Regular Exercise: No    Depression  Over the past two weeks, have you felt down, depressed, or hopeless? No  Over the past two weeks, have you felt little interest or pleasure in doing things? No    Reproductive Health  STD screening in last year: deferred  HIV screening: deferred    Screen for Chronic Disease  CHD Risk Factors: advanced age (older than 55 for men, 65 for women), dyslipidemia, hypertension and male gender  Estimated body mass index is 26.08 kg/m² as calculated from the following:    Height as of this encounter: 5' 6" (1.676 m).    Weight as of this encounter: 73.3 kg (161 lb 9.6 oz).  Dyslipidemia screening needed: order today  T2DM screening needed: today  Colonoscopy needed: n/a  PSA needed: n/a  AAA screening needed:n/a  Screen men 35 years and older, and men 20 to 34 years of age who have cardiovascular risk factors for dyslipidemia  Begin screening colonoscopies at 50 years of age in men of average risk, and continue until 75 years of age; offer fecal occult blood testing every year, flexible sigmoidoscopy every five years combined with fecal occult blood testing every three years, or colonoscopy every 10 years   The American U[employer] Association recommends offering PSA testing and digital rectal examination to well-informed men beginning at 40 years of age and continuing until life expectancy is less than 10 years  Screen once with ultrasonography in men 65 to 75 years of age if they have a family history or have smoked at [id number] cigarettes in their " "lifetime  Screen men with a sustained blood pressure greater than 135/80 mm Hg for T2DM      Immunizations  delayed    ALLERGIES and MEDS were verified.   PMHx, PSHx, FHx, SOCIALHx were updated as pertinent.    REVIEW OF SYSTEMS  Review of Systems   Constitutional: Negative.    HENT: Negative.     Eyes: Negative.    Respiratory: Negative.     Cardiovascular: Negative.    Gastrointestinal: Negative.    Genitourinary: Negative.    Musculoskeletal: Negative.    Skin: Negative.    Neurological: Negative.        PHYSICAL EXAM  VITAL SIGNS: /62   Pulse (!) 59   Temp 97.7 °F (36.5 °C) (Oral)   Resp 18   Ht 5' 6" (1.676 m)   Wt 73.3 kg (161 lb 9.6 oz)   SpO2 98%   BMI 26.08 kg/m²   GEN: Well developed, Well nourished, No acute distress.  HENT: Normocephalic, Atraumatic, Bilateral external ears normal, Nose normal, Oropharynx moist, No oral exudates.   Eyes: PERRL, EOMI, Conjunctiva normal, No discharge.   Neck: Supple, No tenderness.  Lymphatic: No cervical or supraclavicular lymphadenopathy noted.   Cardiovascular: Normal heart rate, Normal rhythm, No murmurs, No rubs, No gallops.   Thorax & Lungs: Normal breath sounds, No respiratory distress, No wheezing.  Abdomen: Soft, No tenderness, Bowel sounds normal.  Genital: deferred  Skin: Warm, Dry, No erythema, No rash.   Extremities: No edema, No tenderness.       ASSESSMENT/PLAN    Tremayne was seen today for follow-up.    Diagnoses and all orders for this visit:    Visit for [facility] health check  -     CBC Auto Differential; Future  -     Comprehensive Metabolic Panel; Future  -     Lipid Panel; Future  -     Urinalysis; Future    Essential hypertension  -     Comprehensive Metabolic Panel; Future    Coronary artery disease involving coronary bypass graft of native heart without angina pectoris  -     Comprehensive Metabolic Panel; Future    Dyslipidemia  -     Lipid Panel; Future    Coronary artery disease involving coronary bypass graft of native heart with " angina pectoris  -     Lipid Panel; Future    Peripheral vascular disease  -     Lipid Panel; Future    History of colon cancer  -     CBC Auto Differential; Future    Other orders  The following orders have not been finalized:  -     Cancel: losartan (COZAAR) 50 MG tablet  -     Cancel: metoprolol succinate (TOPROL-XL) 50 MG 24 hr tablet  -     Cancel: amLODIPine (NORVASC) 5 MG tablet  -     Cancel: rosuvastatin (CRESTOR) 5 MG tablet  -     Cancel: ezetimibe (ZETIA) 10 mg tablet       FOLLOW UP: 6 months or sooner if needed  Medications managed by cardiology    Mayo Groves MD

## 2023-02-01 ENCOUNTER — PROCEDURE VISIT (OUTPATIENT)
Dept: OPHTHALMOLOGY | Facility: CLINIC | Age: 88
End: 2023-02-01
Attending: OPHTHALMOLOGY
Payer: MEDICARE

## 2023-02-01 DIAGNOSIS — H35.3211 EXUDATIVE AGE-RELATED MACULAR DEGENERATION, RIGHT EYE, WITH ACTIVE CHOROIDAL NEOVASCULARIZATION: Primary | ICD-10-CM

## 2023-02-01 PROCEDURE — 99499 NO LOS: ICD-10-PCS | Mod: S$GLB,,, | Performed by: OPHTHALMOLOGY

## 2023-02-01 PROCEDURE — 92134 CPTRZ OPH DX IMG PST SGM RTA: CPT | Mod: S$GLB,,, | Performed by: OPHTHALMOLOGY

## 2023-02-01 PROCEDURE — 67028 INJECTION EYE DRUG: CPT | Mod: RT,S$GLB,, | Performed by: OPHTHALMOLOGY

## 2023-02-01 PROCEDURE — 99499 UNLISTED E&M SERVICE: CPT | Mod: S$GLB,,, | Performed by: OPHTHALMOLOGY

## 2023-02-01 PROCEDURE — 67028 PR INJECT INTRAVITREAL PHARMCOLOGIC: ICD-10-PCS | Mod: RT,S$GLB,, | Performed by: OPHTHALMOLOGY

## 2023-02-01 PROCEDURE — 92134 POSTERIOR SEGMENT OCT RETINA (OCULAR COHERENCE TOMOGRAPHY)-BOTH EYES: ICD-10-PCS | Mod: S$GLB,,, | Performed by: OPHTHALMOLOGY

## 2023-02-01 RX ADMIN — Medication 1.25 MG: at 02:02

## 2023-02-01 NOTE — PROGRESS NOTES
Subjective:       Patient ID: Tremayne Fortune is a 87 y.o. male      Chief Complaint   Patient presents with    Procedure     Avastin od     History of Present Illness  HPI     Procedure     Additional comments: Avastin od           Comments    DLS: 11/23/22    Pt here for 10 week  Avastin OD  Pt states no changes or vision complaints since last exam      1. Wet ARMD with active CNV OD  -S/p Avastin OD (11/23/22)    2. Macular Subretinal Heme OS    3. RD OS    4. Wet ARMD with inactive CNV OS    5. Asteroid Hyalosis OD          Last edited by Alexander Shultz MD on 2/1/2023  3:25 PM.        Imaging:    See report    Assessment/Plan:       1. Exudative age-related macular degeneration, right eye, with active choroidal neovascularization  Doing well 8`0 wks after Av   Rec Av OD today and TREX to 12 wks    Discussed q 12 wks inj vs obs at next visit and RBA.  Pt will consider  Will discuss further next visit    - OCT- Retina      Follow up in about 12 weeks (around 4/26/2023), or if symptoms worsen or fail to improve, for Comprehensive Examination, OCT Mac, Injection Right eye, Avastin.      Patient identified.  Timeout performed.    Risks, benefits, and alternatives to treatment were discussed in detail with the patient, including bleeding/infection (endophthalmitis)/etc.  The patient voiced understanding and wished to proceed with the procedure.  See separate consent form.    Injection Procedure Note:  Diagnosis: Wet AMD Right Eye    Topical Proparacaine drop placed then topical 5% Betadine  Sterile gloves used, and sterile lid speculum placed.  5% Betadine placed at injection site again prior to injection.  Avastin 1.25mg in 0.05cc Injected inferotemporally 3.5-4mm posterior to the limbus.  Complications: None  Va at least CF at 5 feet post injection.  Retina, ONH, IOP normal after injection.    Followup as above.  Patient should return immediately PRN.  Retinal Detachment and Endophthalmitis precautions  given.

## 2023-02-04 ENCOUNTER — LAB VISIT (OUTPATIENT)
Dept: LAB | Facility: HOSPITAL | Age: 88
End: 2023-02-04
Attending: FAMILY MEDICINE
Payer: MEDICARE

## 2023-02-04 DIAGNOSIS — Z00.00 VISIT FOR WELL MAN HEALTH CHECK: ICD-10-CM

## 2023-02-04 DIAGNOSIS — I25.709 CORONARY ARTERY DISEASE INVOLVING CORONARY BYPASS GRAFT OF NATIVE HEART WITH ANGINA PECTORIS: ICD-10-CM

## 2023-02-04 DIAGNOSIS — I25.810 CORONARY ARTERY DISEASE INVOLVING CORONARY BYPASS GRAFT OF NATIVE HEART WITHOUT ANGINA PECTORIS: ICD-10-CM

## 2023-02-04 DIAGNOSIS — I10 ESSENTIAL HYPERTENSION: ICD-10-CM

## 2023-02-04 DIAGNOSIS — I73.9 PERIPHERAL VASCULAR DISEASE: ICD-10-CM

## 2023-02-04 DIAGNOSIS — Z85.038 HISTORY OF COLON CANCER: ICD-10-CM

## 2023-02-04 DIAGNOSIS — E78.5 DYSLIPIDEMIA: ICD-10-CM

## 2023-02-04 LAB
ALBUMIN SERPL BCP-MCNC: 3.7 G/DL (ref 3.5–5.2)
ALP SERPL-CCNC: 93 U/L (ref 55–135)
ALT SERPL W/O P-5'-P-CCNC: 29 U/L (ref 10–44)
ANION GAP SERPL CALC-SCNC: 12 MMOL/L (ref 8–16)
AST SERPL-CCNC: 23 U/L (ref 10–40)
BASOPHILS # BLD AUTO: 0.02 K/UL (ref 0–0.2)
BASOPHILS NFR BLD: 0.4 % (ref 0–1.9)
BILIRUB SERPL-MCNC: 0.7 MG/DL (ref 0.1–1)
BUN SERPL-MCNC: 12 MG/DL (ref 8–23)
CALCIUM SERPL-MCNC: 9.5 MG/DL (ref 8.7–10.5)
CHLORIDE SERPL-SCNC: 103 MMOL/L (ref 95–110)
CHOLEST SERPL-MCNC: 131 MG/DL (ref 120–199)
CHOLEST/HDLC SERPL: 3.2 {RATIO} (ref 2–5)
CO2 SERPL-SCNC: 24 MMOL/L (ref 23–29)
CREAT SERPL-MCNC: 0.7 MG/DL (ref 0.5–1.4)
DIFFERENTIAL METHOD: ABNORMAL
EOSINOPHIL # BLD AUTO: 0.2 K/UL (ref 0–0.5)
EOSINOPHIL NFR BLD: 3.7 % (ref 0–8)
ERYTHROCYTE [DISTWIDTH] IN BLOOD BY AUTOMATED COUNT: 13.5 % (ref 11.5–14.5)
EST. GFR  (NO RACE VARIABLE): >60 ML/MIN/1.73 M^2
GLUCOSE SERPL-MCNC: 90 MG/DL (ref 70–110)
HCT VFR BLD AUTO: 42.3 % (ref 40–54)
HDLC SERPL-MCNC: 41 MG/DL (ref 40–75)
HDLC SERPL: 31.3 % (ref 20–50)
HGB BLD-MCNC: 13.4 G/DL (ref 14–18)
IMM GRANULOCYTES # BLD AUTO: 0.02 K/UL (ref 0–0.04)
IMM GRANULOCYTES NFR BLD AUTO: 0.4 % (ref 0–0.5)
LDLC SERPL CALC-MCNC: 50.8 MG/DL (ref 63–159)
LYMPHOCYTES # BLD AUTO: 1.2 K/UL (ref 1–4.8)
LYMPHOCYTES NFR BLD: 23.6 % (ref 18–48)
MCH RBC QN AUTO: 30.7 PG (ref 27–31)
MCHC RBC AUTO-ENTMCNC: 31.7 G/DL (ref 32–36)
MCV RBC AUTO: 97 FL (ref 82–98)
MONOCYTES # BLD AUTO: 0.5 K/UL (ref 0.3–1)
MONOCYTES NFR BLD: 9.8 % (ref 4–15)
NEUTROPHILS # BLD AUTO: 3.2 K/UL (ref 1.8–7.7)
NEUTROPHILS NFR BLD: 62.1 % (ref 38–73)
NONHDLC SERPL-MCNC: 90 MG/DL
NRBC BLD-RTO: 0 /100 WBC
PLATELET # BLD AUTO: 181 K/UL (ref 150–450)
PMV BLD AUTO: 9.6 FL (ref 9.2–12.9)
POTASSIUM SERPL-SCNC: 4.4 MMOL/L (ref 3.5–5.1)
PROT SERPL-MCNC: 7 G/DL (ref 6–8.4)
RBC # BLD AUTO: 4.36 M/UL (ref 4.6–6.2)
SODIUM SERPL-SCNC: 139 MMOL/L (ref 136–145)
TRIGL SERPL-MCNC: 196 MG/DL (ref 30–150)
WBC # BLD AUTO: 5.12 K/UL (ref 3.9–12.7)

## 2023-02-04 PROCEDURE — 36415 COLL VENOUS BLD VENIPUNCTURE: CPT | Mod: PO | Performed by: FAMILY MEDICINE

## 2023-02-04 PROCEDURE — 85025 COMPLETE CBC W/AUTO DIFF WBC: CPT | Performed by: FAMILY MEDICINE

## 2023-02-04 PROCEDURE — 80053 COMPREHEN METABOLIC PANEL: CPT | Performed by: FAMILY MEDICINE

## 2023-02-04 PROCEDURE — 80061 LIPID PANEL: CPT | Performed by: FAMILY MEDICINE

## 2023-02-06 NOTE — PATIENT INSTRUCTIONS

## 2023-03-09 ENCOUNTER — PES CALL (OUTPATIENT)
Dept: ADMINISTRATIVE | Facility: CLINIC | Age: 88
End: 2023-03-09
Payer: MEDICARE

## 2023-05-23 ENCOUNTER — PROCEDURE VISIT (OUTPATIENT)
Dept: OPHTHALMOLOGY | Facility: CLINIC | Age: 88
End: 2023-05-23
Attending: OPHTHALMOLOGY
Payer: MEDICARE

## 2023-05-23 DIAGNOSIS — H35.3222 EXUDATIVE AGE-RELATED MACULAR DEGENERATION, LEFT EYE, WITH INACTIVE CHOROIDAL NEOVASCULARIZATION: ICD-10-CM

## 2023-05-23 DIAGNOSIS — H43.21 ASTEROID HYALOSIS, RIGHT: ICD-10-CM

## 2023-05-23 DIAGNOSIS — H35.3211 EXUDATIVE AGE-RELATED MACULAR DEGENERATION, RIGHT EYE, WITH ACTIVE CHOROIDAL NEOVASCULARIZATION: Primary | ICD-10-CM

## 2023-05-23 DIAGNOSIS — H33.22 RETINAL DETACHMENT, LEFT: ICD-10-CM

## 2023-05-23 PROCEDURE — 99214 OFFICE O/P EST MOD 30 MIN: CPT | Mod: 25,S$GLB,, | Performed by: OPHTHALMOLOGY

## 2023-05-23 PROCEDURE — 92134 CPTRZ OPH DX IMG PST SGM RTA: CPT | Mod: S$GLB,,, | Performed by: OPHTHALMOLOGY

## 2023-05-23 PROCEDURE — 67028 PR INJECT INTRAVITREAL PHARMCOLOGIC: ICD-10-PCS | Mod: RT,S$GLB,, | Performed by: OPHTHALMOLOGY

## 2023-05-23 PROCEDURE — 67028 INJECTION EYE DRUG: CPT | Mod: RT,S$GLB,, | Performed by: OPHTHALMOLOGY

## 2023-05-23 PROCEDURE — 99214 PR OFFICE/OUTPT VISIT, EST, LEVL IV, 30-39 MIN: ICD-10-PCS | Mod: 25,S$GLB,, | Performed by: OPHTHALMOLOGY

## 2023-05-23 PROCEDURE — 92134 POSTERIOR SEGMENT OCT RETINA (OCULAR COHERENCE TOMOGRAPHY)-BOTH EYES: ICD-10-PCS | Mod: S$GLB,,, | Performed by: OPHTHALMOLOGY

## 2023-05-23 RX ADMIN — Medication 1.25 MG: at 12:05

## 2023-05-23 NOTE — PROGRESS NOTES
Subjective:       Patient ID: Tremayne Fortune is a 88 y.o. male      Chief Complaint   Patient presents with    Follow-up     AMD, RD with PVR f/u as instructed     History of Present Illness  HPI     Follow-up     Additional comments: AMD, RD with PVR f/u as instructed           Comments    12 wk OCT/DFE/SWATHI OD    Pt States his vision has not changed and he would like to skip injections   today if he is able to.   No complaints OD  OS without pain but vision very poor  No Fl/Juan M/Pain    Eye Meds:   None               Last edited by Alexander Shultz MD on 5/23/2023  2:00 PM.        Imaging:    See report    Assessment/Plan:       1. Exudative age-related macular degeneration, right eye, with active choroidal neovascularization  Stable today 3.5 months s/p Avastin.  Overdue for planned f/u and poss inj    Discussed option q 3 months Avastin vs obs and RBA including endophthalmitis and rebleed  Recommend injection given poor vision OS, massive SRH os  Pt elects injection    Avastin OD today    - OCT- Retina  - Prior authorization Ordern    - Posterior Segment OCT Retina-Both eyes    2. Macular subretinal hemorrhage, left  Had large heme as presenting sign/symptom of wet AMD OS.    Had surgical displacement and subsequent RD  Given prior submac heme and mac off RD, has very low potential.  Recommend observation    3. Retinal detachment, left  Still with stable inferior traction.  Recommend continued obs  RD precautions    4. Exudative age-related macular degeneration, left eye, with inactive choroidal neovascularization  See #2    5. Asteroid hyalosis, right  Observe     Follow up in about 3 months (around 8/23/2023), or if symptoms worsen or fail to improve, for OCT and INJECTION ONLY, Injection Right eye, Avastin.      Patient identified.  Timeout performed.    Risks, benefits, and alternatives to treatment were discussed in detail with the patient, including bleeding/infection (endophthalmitis)/etc.  The  patient voiced understanding and wished to proceed with the procedure.  See separate consent form.    Injection Procedure Note:  Diagnosis: Wet AMD Right Eye    Topical Proparacaine drop placed then topical 5% Betadine  Sterile gloves used, and sterile lid speculum placed.  5% Betadine placed at injection site again prior to injection.  Avastin 1.25mg in 0.05cc Injected inferotemporally 3.5-4mm posterior to the limbus.  Complications: None  Va at least CF at 5 feet post injection.  Retina, ONH, IOP normal after injection.    Followup as above.  Patient should return immediately PRN.  Retinal Detachment and Endophthalmitis precautions given.

## 2023-05-24 NOTE — PATIENT INSTRUCTIONS

## 2023-07-24 ENCOUNTER — TELEPHONE (OUTPATIENT)
Dept: FAMILY MEDICINE | Facility: CLINIC | Age: 88
End: 2023-07-24
Payer: MEDICARE

## 2023-07-24 NOTE — TELEPHONE ENCOUNTER
Brendan and Mr. Fortune appt has been change from 08/31/23 to 02/22/23 @ 2:20 pm for  and 2:40 pm Mrs Fortune

## 2023-08-17 ENCOUNTER — HOSPITAL ENCOUNTER (EMERGENCY)
Facility: HOSPITAL | Age: 88
Discharge: HOME | End: 2023-08-17
Attending: EMERGENCY MEDICINE
Payer: MEDICARE

## 2023-08-17 ENCOUNTER — NURSE TRIAGE (OUTPATIENT)
Dept: ADMINISTRATIVE | Facility: CLINIC | Age: 88
End: 2023-08-17
Payer: MEDICARE

## 2023-08-17 VITALS
RESPIRATION RATE: 18 BRPM | DIASTOLIC BLOOD PRESSURE: 60 MMHG | OXYGEN SATURATION: 99 % | SYSTOLIC BLOOD PRESSURE: 132 MMHG | TEMPERATURE: 98 F | HEART RATE: 58 BPM

## 2023-08-17 DIAGNOSIS — H11.31 SUBCONJUNCTIVAL HEMORRHAGE OF RIGHT EYE: Primary | ICD-10-CM

## 2023-08-17 PROCEDURE — 99281 EMR DPT VST MAYX REQ PHY/QHP: CPT

## 2023-08-17 NOTE — TELEPHONE ENCOUNTER
"Pt's daughter, Hortencia, reports pt is having a bloodshot red eye, does have an eye appointment on Tuesday, but not sure if he needs to be seen sooner. Pt advised to see a MD within 3 days per protocol, Pt encouraged to call eye doctor in the morning to see if he can have a sooner appointment. Instructed to call back with any worsening symptoms or questions, and she/he verbalized understanding.    Reason for Disposition   Bleeding on white of the eye    Additional Information   Negative: SEVERE eye pain   Negative: [1] Eyelids are very swollen (shut or almost) AND [2] fever   Negative: [1] Eyelid (outer) is very red (or tender to touch) AND [2] fever   Negative: [1] Foreign body sensation ("feels like something is in there") AND [2] irrigation didn't help   Negative: Vomiting   Negative: [1] Recent eye surgery AND [2] increasing eye pain   Negative: Patient sounds very sick or weak to the triager   Negative: MODERATE eye pain or discomfort (e.g., interferes with normal activities or awakens from sleep; more than mild)   Negative: New or worsening blurred vision   Negative: Looking at light causes MODERATE to SEVERE eye pain (i.e., photophobia)   Negative: Cloudy spot or sore seen on the cornea (clear part of the eye)   Negative: Eyelid is very swollen (shut or almost)   Negative: Eyelid is red and painful (or tender to touch)   Negative: Eye pain present > 24 hours   Negative: [1] Bleeding on white of the eye AND [2] taking Coumadin (warfarin) or other strong blood thinner, or known bleeding disorder (e.g., thrombocytopenia)    Protocols used: Eye - Red Without Pus-A-AH    "

## 2023-08-18 NOTE — ED NOTES
Tremayne Fortune, a 88 y.o. male presents to the ED w/ complaint of right eye rednesss. Pt daughter states pt has macular degeneration and lost vision in left eye and was told to come to ed if right eye shows any symptoms. Right eye is noted to have blood in it. Pt denies any visual changes. Nadn aao x 4 resp even and unlabored.     Triage note:  Chief Complaint   Patient presents with    Eye Problem     Right eye with medial/inferior erythema/bloodshot. Onset sometime earlier today, patient reports no vision change or pain. Denies bearing down hard today. 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      Review of patient's allergies indicates:   Allergen Reactions    Shellfish containing products Hives and Nausea And Vomiting            Past Medical History:   Diagnosis Date    Colon cancer     Hypertension     Macular degeneration     Total retinal detachment of left eye 02/23/2021

## 2023-08-18 NOTE — ED PROVIDER NOTES
Encounter Date: 8/17/2023       History     Chief Complaint   Patient presents with    Eye Problem     Right eye with medial/inferior erythema/bloodshot. Onset sometime earlier today, patient reports no vision change or pain. Denies bearing down hard today. 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      88-year-old male, history of hypertension, macular degeneration, complaining of redness to the medial and inferior aspect of his right eye, onset at some point today.  No pain in his eye, no visual changes or disturbances.  No trauma to the eye today.  No inciting events that the patient can remember.    The history is provided by the patient.     Review of patient's allergies indicates:   Allergen Reactions    Shellfish containing products Hives and Nausea And Vomiting            Past Medical History:   Diagnosis Date    Colon cancer     Hypertension     Macular degeneration     Total retinal detachment of left eye 02/23/2021     Past Surgical History:   Procedure Laterality Date    CARDIAC SURGERY      4 vessel CABG    CATARACT EXTRACTION W/  INTRAOCULAR LENS IMPLANT Right 07/28/2016    Dr. Delgado    CATARACT EXTRACTION W/  INTRAOCULAR LENS IMPLANT Left 08/11/2016    Dr. Delgado     CHOLECYSTECTOMY      COLON SURGERY      colon resection    REPAIR OF RETINAL DETACHMENT WITH VITRECTOMY Left 02/23/2021    Procedure: REPAIR, RETINAL DETACHMENT, WITH VITRECTOMY/SCERAL BUCKLE/ LEFT EYE;  Surgeon: Alexander Shultz MD;  Location: [facility] OR [address] [place] [address];  Service: Ophthalmology;  Laterality: Left;    SCLERAL BUCKLING Left 02/23/2021    Procedure: SCLERAL BUCKLING;  Surgeon: Alexander Shultz MD;  Location: [facility] OR [address] [place] [address];  Service: Ophthalmology;  Laterality: Left;    VITRECTOMY BY PARS PLANA APPROACH Left 12/15/2020    Procedure: VITRECTOMY, PARS PLANA APPROACH, injection of subretinal tpa, injection of gas, left eye;  Surgeon: Alexander Shultz MD;  Location: [facility] OR [address] [place] [address];  Service: Ophthalmology;   Laterality: Left;  pt can't arrive until 9 or 10am     Family History   Problem Relation Age of Onset    No Known Problems Mother     No Known Problems Father     No Known Problems Sister     No Known Problems Brother     No Known Problems Maternal Aunt     No Known Problems Maternal Uncle     No Known Problems Paternal Aunt     No Known Problems Paternal Uncle     No Known Problems Maternal Grandmother     No Known Problems Maternal Grandfather     No Known Problems Paternal Grandmother     No Known Problems Paternal Grandfather     Amblyopia Neg Hx     Blindness Neg Hx     Cancer Neg Hx     Cataracts Neg Hx     Diabetes Neg Hx     Glaucoma Neg Hx     Hypertension Neg Hx     Macular degeneration Neg Hx     Retinal detachment Neg Hx     Strabismus Neg Hx     Stroke Neg Hx     Thyroid disease Neg Hx      Social History     Tobacco Use    Smoking status: Former     Current packs/day: 0.00     Types: Cigarettes     Quit date: 1996     Years since quittin.0     Passive exposure: Past    Smokeless tobacco: Never   Substance Use Topics    Alcohol use: No    Drug use: No     Review of Systems    Physical Exam     Initial Vitals [23 2136]   BP Pulse Resp Temp SpO2   132/60 (!) 55 18 98.4 °F (36.9 °C) 99 %      MAP       --         Physical Exam    Nursing note and vitals reviewed.  Constitutional: Vital signs are normal. He appears well-developed and well-nourished. He is not diaphoretic.  Non-toxic appearance. He does not appear ill. No distress.   HENT:   Mouth/Throat: Mucous membranes are normal. Mucous membranes are not dry.   Eyes: EOM and lids are normal. Right eye exhibits no chemosis and no discharge. Right conjunctiva has a hemorrhage. No scleral icterus.       Subconjunctival Hemorrhage appreciated to the medial and inferior aspect of the right eye    Right pupil is small but reactive to light   Neck: Neck supple.   Normal range of motion.  Cardiovascular:  Normal rate.           Musculoskeletal:       Cervical back: Normal range of motion and neck supple.     Neurological: He is alert and oriented to person, place, and time.   Skin: Skin is dry and intact. No pallor.   Psychiatric: He has a normal mood and affect. His speech is normal and behavior is normal.         ED Course   Procedures  Labs Reviewed - No data to display         Imaging Results    None          Medications - No data to display  Medical Decision Making  Subconjunctival hemorrhage, no visual complaints or disturbances  Patient only on aspirin    Supportive care recommended  Already has follow-up scheduled with optometry next week                               Clinical Impression:   Final diagnoses:  [H11.31] Subconjunctival hemorrhage of right eye (Primary)        ED Disposition Condition    Discharge Stable          ED Prescriptions    None       Follow-up Information       Follow up With Specialties Details Why Contact Info    Page, Mayo THORPE MD Family Medicine, Wound Care Call   [address] [facility]  [place] [address] [phone]               Lorna Canales MD  08/18/23 0019

## 2023-08-22 ENCOUNTER — PROCEDURE VISIT (OUTPATIENT)
Dept: OPHTHALMOLOGY | Facility: CLINIC | Age: 88
End: 2023-08-22
Attending: OPHTHALMOLOGY
Payer: MEDICARE

## 2023-08-22 DIAGNOSIS — H35.3211 EXUDATIVE AGE-RELATED MACULAR DEGENERATION, RIGHT EYE, WITH ACTIVE CHOROIDAL NEOVASCULARIZATION: Primary | ICD-10-CM

## 2023-08-22 PROCEDURE — 92134 CPTRZ OPH DX IMG PST SGM RTA: CPT | Mod: S$GLB,,, | Performed by: OPHTHALMOLOGY

## 2023-08-22 PROCEDURE — 67028 INJECTION EYE DRUG: CPT | Mod: RT,S$GLB,, | Performed by: OPHTHALMOLOGY

## 2023-08-22 PROCEDURE — 99499 NO LOS: ICD-10-PCS | Mod: S$GLB,,, | Performed by: OPHTHALMOLOGY

## 2023-08-22 PROCEDURE — 99499 UNLISTED E&M SERVICE: CPT | Mod: S$GLB,,, | Performed by: OPHTHALMOLOGY

## 2023-08-22 PROCEDURE — 92134 POSTERIOR SEGMENT OCT RETINA (OCULAR COHERENCE TOMOGRAPHY)-BOTH EYES: ICD-10-PCS | Mod: S$GLB,,, | Performed by: OPHTHALMOLOGY

## 2023-08-22 PROCEDURE — 67028 PR INJECT INTRAVITREAL PHARMCOLOGIC: ICD-10-PCS | Mod: RT,S$GLB,, | Performed by: OPHTHALMOLOGY

## 2023-08-22 RX ORDER — NAPROXEN SODIUM 220 MG/1
1 TABLET ORAL
COMMUNITY

## 2023-08-22 RX ADMIN — Medication 1.25 MG: at 12:08

## 2023-08-22 NOTE — PROGRESS NOTES
Subjective:       Patient ID: Tremayne Fortune is a 88 y.o. male      Chief Complaint   Patient presents with    Follow-up     History of Present Illness  HPI    3m OCT/SWATHI OD    Pt states no changes to va since last visit.  No problems today    Went to ED for NIA.  Resolving  Last edited by Alexander Shultz MD on 8/22/2023  5:00 PM.        Imaging:    See report    Assessment/Plan:       1. Exudative age-related macular degeneration, right eye, with active choroidal neovascularization  Stable today 3 months s/p Avastin.  Overdue for planned f/u and poss inj    Again discussed option q 3 months Avastin vs obs and RBA including endophthalmitis and rebleed  Recommend injection given poor vision OS, massive SRH os  Pt elects injection but requests to space slightly  Had success at 3.5 months before    Avastin OD today and 3.5 months    - OCT- Retina  - Prior authorization Ordern    - Posterior Segment OCT Retina-Both eyes        Follow up in about 14 weeks (around 11/28/2023), or if symptoms worsen or fail to improve, for Dilated Examination, OCT Mac, Avastin, Injection Right eye.      Patient identified.  Timeout performed.    Risks, benefits, and alternatives to treatment were discussed in detail with the patient, including bleeding/infection (endophthalmitis)/etc.  The patient voiced understanding and wished to proceed with the procedure.  See separate consent form.    Injection Procedure Note:  Diagnosis: Wet AMD Right Eye    Topical Proparacaine drop placed then topical 5% Betadine  Sterile gloves used, and sterile lid speculum placed.  5% Betadine placed at injection site again prior to injection.  Avastin 1.25mg in 0.05cc Injected inferotemporally 3.5-4mm posterior to the limbus.  Complications: None  Va at least CF at 5 feet post injection.  Retina, ONH, IOP normal after injection.    Followup as above.  Patient should return immediately PRN.  Retinal Detachment and Endophthalmitis precautions  given.

## 2023-08-23 NOTE — PATIENT INSTRUCTIONS

## 2024-02-22 ENCOUNTER — OFFICE VISIT (OUTPATIENT)
Dept: FAMILY MEDICINE | Facility: CLINIC | Age: 89
End: 2024-02-22
Payer: MEDICARE

## 2024-02-22 ENCOUNTER — LAB VISIT (OUTPATIENT)
Dept: LAB | Facility: HOSPITAL | Age: 89
End: 2024-02-22
Attending: FAMILY MEDICINE
Payer: MEDICARE

## 2024-02-22 VITALS
HEART RATE: 60 BPM | OXYGEN SATURATION: 97 % | HEIGHT: 66 IN | DIASTOLIC BLOOD PRESSURE: 62 MMHG | SYSTOLIC BLOOD PRESSURE: 130 MMHG | RESPIRATION RATE: 18 BRPM | BODY MASS INDEX: 28.08 KG/M2 | WEIGHT: 174.69 LBS | TEMPERATURE: 98 F

## 2024-02-22 DIAGNOSIS — I73.9 PERIPHERAL VASCULAR DISEASE: ICD-10-CM

## 2024-02-22 DIAGNOSIS — I10 ESSENTIAL HYPERTENSION: ICD-10-CM

## 2024-02-22 DIAGNOSIS — E78.5 DYSLIPIDEMIA: ICD-10-CM

## 2024-02-22 DIAGNOSIS — Z23 INFLUENZA VACCINE NEEDED: ICD-10-CM

## 2024-02-22 DIAGNOSIS — I25.810 CORONARY ARTERY DISEASE INVOLVING CORONARY BYPASS GRAFT OF NATIVE HEART WITHOUT ANGINA PECTORIS: ICD-10-CM

## 2024-02-22 DIAGNOSIS — Z00.00 VISIT FOR WELL MAN HEALTH CHECK: Primary | ICD-10-CM

## 2024-02-22 DIAGNOSIS — Z00.00 VISIT FOR WELL MAN HEALTH CHECK: ICD-10-CM

## 2024-02-22 PROBLEM — I25.709 CORONARY ARTERY DISEASE INVOLVING CORONARY BYPASS GRAFT OF NATIVE HEART WITH ANGINA PECTORIS: Status: RESOLVED | Noted: 2022-01-12 | Resolved: 2024-02-22

## 2024-02-22 LAB
ALBUMIN SERPL BCP-MCNC: 3.8 G/DL (ref 3.5–5.2)
ALP SERPL-CCNC: 110 U/L (ref 55–135)
ALT SERPL W/O P-5'-P-CCNC: 70 U/L (ref 10–44)
ANION GAP SERPL CALC-SCNC: 8 MMOL/L (ref 8–16)
AST SERPL-CCNC: 37 U/L (ref 10–40)
BASOPHILS # BLD AUTO: 0.02 K/UL (ref 0–0.2)
BASOPHILS NFR BLD: 0.4 % (ref 0–1.9)
BILIRUB SERPL-MCNC: 0.6 MG/DL (ref 0.1–1)
BUN SERPL-MCNC: 10 MG/DL (ref 8–23)
CALCIUM SERPL-MCNC: 9.4 MG/DL (ref 8.7–10.5)
CHLORIDE SERPL-SCNC: 102 MMOL/L (ref 95–110)
CHOLEST SERPL-MCNC: 127 MG/DL (ref 120–199)
CHOLEST/HDLC SERPL: 3.5 {RATIO} (ref 2–5)
CO2 SERPL-SCNC: 25 MMOL/L (ref 23–29)
CREAT SERPL-MCNC: 0.9 MG/DL (ref 0.5–1.4)
DIFFERENTIAL METHOD BLD: ABNORMAL
EOSINOPHIL # BLD AUTO: 0.2 K/UL (ref 0–0.5)
EOSINOPHIL NFR BLD: 3.3 % (ref 0–8)
ERYTHROCYTE [DISTWIDTH] IN BLOOD BY AUTOMATED COUNT: 14.4 % (ref 11.5–14.5)
EST. GFR  (NO RACE VARIABLE): >60 ML/MIN/1.73 M^2
GLUCOSE SERPL-MCNC: 99 MG/DL (ref 70–110)
HCT VFR BLD AUTO: 41.4 % (ref 40–54)
HDLC SERPL-MCNC: 36 MG/DL (ref 40–75)
HDLC SERPL: 28.3 % (ref 20–50)
HGB BLD-MCNC: 13.4 G/DL (ref 14–18)
IMM GRANULOCYTES # BLD AUTO: 0.03 K/UL (ref 0–0.04)
IMM GRANULOCYTES NFR BLD AUTO: 0.5 % (ref 0–0.5)
LDLC SERPL CALC-MCNC: ABNORMAL MG/DL (ref 63–159)
LYMPHOCYTES # BLD AUTO: 1.4 K/UL (ref 1–4.8)
LYMPHOCYTES NFR BLD: 23.9 % (ref 18–48)
MCH RBC QN AUTO: 31 PG (ref 27–31)
MCHC RBC AUTO-ENTMCNC: 32.4 G/DL (ref 32–36)
MCV RBC AUTO: 96 FL (ref 82–98)
MONOCYTES # BLD AUTO: 0.5 K/UL (ref 0.3–1)
MONOCYTES NFR BLD: 9.5 % (ref 4–15)
NEUTROPHILS # BLD AUTO: 3.5 K/UL (ref 1.8–7.7)
NEUTROPHILS NFR BLD: 62.4 % (ref 38–73)
NONHDLC SERPL-MCNC: 91 MG/DL
NRBC BLD-RTO: 0 /100 WBC
PLATELET # BLD AUTO: 193 K/UL (ref 150–450)
PMV BLD AUTO: 9.9 FL (ref 9.2–12.9)
POTASSIUM SERPL-SCNC: 4.2 MMOL/L (ref 3.5–5.1)
PROT SERPL-MCNC: 7.5 G/DL (ref 6–8.4)
RBC # BLD AUTO: 4.32 M/UL (ref 4.6–6.2)
SODIUM SERPL-SCNC: 135 MMOL/L (ref 136–145)
TRIGL SERPL-MCNC: 411 MG/DL (ref 30–150)
WBC # BLD AUTO: 5.68 K/UL (ref 3.9–12.7)

## 2024-02-22 PROCEDURE — G0008 ADMIN INFLUENZA VIRUS VAC: HCPCS | Mod: S$GLB,,, | Performed by: FAMILY MEDICINE

## 2024-02-22 PROCEDURE — 36415 COLL VENOUS BLD VENIPUNCTURE: CPT | Mod: PO | Performed by: FAMILY MEDICINE

## 2024-02-22 PROCEDURE — 85025 COMPLETE CBC W/AUTO DIFF WBC: CPT | Performed by: FAMILY MEDICINE

## 2024-02-22 PROCEDURE — 90694 VACC AIIV4 NO PRSRV 0.5ML IM: CPT | Mod: S$GLB,,, | Performed by: FAMILY MEDICINE

## 2024-02-22 PROCEDURE — 99397 PER PM REEVAL EST PAT 65+ YR: CPT | Mod: S$GLB,,, | Performed by: FAMILY MEDICINE

## 2024-02-22 PROCEDURE — 80061 LIPID PANEL: CPT | Performed by: FAMILY MEDICINE

## 2024-02-22 PROCEDURE — 99999 PR PBB SHADOW E&M-EST. PATIENT-LVL V: CPT | Mod: PBBFAC,,, | Performed by: FAMILY MEDICINE

## 2024-02-22 PROCEDURE — 80053 COMPREHEN METABOLIC PANEL: CPT | Performed by: FAMILY MEDICINE

## 2024-02-22 RX ORDER — METOPROLOL SUCCINATE 50 MG/1
50 TABLET, EXTENDED RELEASE ORAL 2 TIMES DAILY
Qty: 180 TABLET | Refills: 1 | Status: CANCELLED | OUTPATIENT
Start: 2024-02-22 | End: 2024-08-20

## 2024-02-22 RX ORDER — ROSUVASTATIN CALCIUM 5 MG/1
5 TABLET, COATED ORAL NIGHTLY
Qty: 90 TABLET | Refills: 1 | Status: CANCELLED | OUTPATIENT
Start: 2024-02-22

## 2024-02-22 RX ORDER — MONTELUKAST SODIUM 4 MG/1
1 TABLET, CHEWABLE ORAL 2 TIMES DAILY
Qty: 180 TABLET | Refills: 1 | Status: CANCELLED | OUTPATIENT
Start: 2024-02-22

## 2024-02-22 RX ORDER — LOSARTAN POTASSIUM 50 MG/1
50 TABLET ORAL DAILY
Qty: 90 TABLET | Refills: 1 | Status: CANCELLED | OUTPATIENT
Start: 2024-02-22

## 2024-02-22 RX ORDER — EZETIMIBE 10 MG/1
10 TABLET ORAL DAILY
Qty: 90 TABLET | Refills: 1 | Status: CANCELLED | OUTPATIENT
Start: 2024-02-22

## 2024-02-22 RX ORDER — AMLODIPINE BESYLATE 5 MG/1
5 TABLET ORAL NIGHTLY
Qty: 90 TABLET | Refills: 1 | Status: CANCELLED | OUTPATIENT
Start: 2024-02-22

## 2024-02-22 NOTE — PROGRESS NOTES
"Well Man VISIT      CHIEF COMPLAINT  Chief Complaint   Patient presents with    Follow-up    Hypertension    Medication Refill       HPI  Tremayne Fortune is a 88 y.o. male who presents for physical.     Social Factors  Tobacco use: No  Ready to Quit: No  Alcohol: No  Intimate partner violence screening  "Do you feel safe in your current relationship?" yes  "Have you ever been in a relationship in which your partner frightened you or hurt you?" No  Living Will/POA: No  Regular Exercise: No    Depression  Over the past two weeks, have you felt down, depressed, or hopeless? No  Over the past two weeks, have you felt little interest or pleasure in doing things? No    Reproductive Health  STD screening in last year: deferred  HIV screening: deferred    Screen for Chronic Disease  CHD Risk Factors: advanced age (older than 55 for men, 65 for women), dyslipidemia, hypertension and male gender  Estimated body mass index is 28.2 kg/m² as calculated from the following:    Height as of this encounter: 5' 6" (1.676 m).    Weight as of this encounter: 79.2 kg (174 lb 11.4 oz).  Dyslipidemia screening needed: order today  T2DM screening needed: today  Colonoscopy needed: n/a  PSA needed: n/a  AAA screening needed:n/a  Screen men 35 years and older, and men 20 to 34 years of age who have cardiovascular risk factors for dyslipidemia  Begin screening colonoscopies at 50 years of age in men of average risk, and continue until 75 years of age; offer fecal occult blood testing every year, flexible sigmoidoscopy every five years combined with fecal occult blood testing every three years, or colonoscopy every 10 years   The American U[employer] Association recommends offering PSA testing and digital rectal examination to well-informed men beginning at 40 years of age and continuing until life expectancy is less than 10 years  Screen once with ultrasonography in men 65 to 75 years of age if they have a family history or have smoked " "[id number] cigarettes in their lifetime  Screen men with a sustained blood pressure greater than 135/80 mm Hg for T2DM      Immunizations  delayed    ALLERGIES and MEDS were verified.   PMHx, PSHx, FHx, SOCIALHx were updated as pertinent.    REVIEW OF SYSTEMS  Review of Systems   Constitutional: Negative.    HENT: Negative.     Eyes: Negative.    Respiratory: Negative.     Cardiovascular: Negative.    Gastrointestinal: Negative.    Genitourinary: Negative.    Musculoskeletal: Negative.    Skin: Negative.    Neurological: Negative.          PHYSICAL EXAM  VITAL SIGNS: /62   Pulse 60   Temp 97.7 °F (36.5 °C) (Oral)   Resp 18   Ht 5' 6" (1.676 m)   Wt 79.2 kg (174 lb 11.4 oz)   SpO2 97%   BMI 28.20 kg/m²   GEN: Well developed, Well nourished, No acute distress.  HENT: Normocephalic, Atraumatic, Bilateral external ears normal, Nose normal, Oropharynx moist, No oral exudates.   Eyes: PERRL, EOMI, Conjunctiva normal, No discharge.   Neck: Supple, No tenderness.  Lymphatic: No cervical or supraclavicular lymphadenopathy noted.   Cardiovascular: Normal heart rate, Normal rhythm, No murmurs, No rubs, No gallops.   Thorax & Lungs: Normal breath sounds, No respiratory distress, No wheezing.  Abdomen: Soft, No tenderness, Bowel sounds normal.  Genital: deferred  Skin: Warm, Dry, No erythema, No rash.   Extremities: No edema, No tenderness.       ASSESSMENT/PLAN    Tremayne was seen today for follow-up, hypertension and medication refill.    Diagnoses and all orders for this visit:    Visit for [facility] health check  -     CBC Auto Differential; Future  -     Comprehensive Metabolic Panel; Future  -     Lipid Panel; Future  - Labs to be done today    Influenza vaccine needed  -     Influenza (FLUAD) - Quadrivalent (Adjuvanted) *Preferred* (65+) (PF)    Dyslipidemia  -     Lipid Panel; Future  - Labs today  - On statin therapy without adverse reactions    Essential hypertension  -     Comprehensive Metabolic Panel; " Future  - Stable on current regimen  - No chest pain, weakness, numbness, or shortness of breath    Peripheral vascular disease  -     Lipid Panel; Future  - On statin therapy    Coronary artery disease involving coronary bypass graft of native heart without angina pectoris  - HTN and dyslipidemia controlled             FOLLOW UP: 6 months or sooner if needed  Medications managed by cardiology    Mayo Groves MD

## 2024-07-22 ENCOUNTER — TELEPHONE (OUTPATIENT)
Dept: FAMILY MEDICINE | Facility: CLINIC | Age: 89
End: 2024-07-22
Payer: MEDICARE

## 2024-07-22 NOTE — TELEPHONE ENCOUNTER
Tried to reach pt about the no show/cancel/ reschedule  appt  on 08/29/2024 therefore this appt has been rescheduled to 01/29/25 @ 9:20 am new appointment letter has been sent out to the pt in the mail also a my chart message   Done vs

## 2024-10-17 ENCOUNTER — TELEPHONE (OUTPATIENT)
Dept: FAMILY MEDICINE | Facility: CLINIC | Age: 89
End: 2024-10-17
Payer: MEDICARE

## 2024-10-17 NOTE — TELEPHONE ENCOUNTER
phone pt to eleazar appt per  pt has been recsh from 02/28/25 to 04/23/25 @ 3:20 pm  will send a my chart message  and mail out new appt letter if you need refill  on your medication please send me a my chart message /call the offce vs

## 2024-11-12 ENCOUNTER — TELEPHONE (OUTPATIENT)
Dept: OPHTHALMOLOGY | Facility: CLINIC | Age: 89
End: 2024-11-12
Payer: MEDICARE

## 2024-11-12 NOTE — TELEPHONE ENCOUNTER
----- Message from Deshaun sent at 11/12/2024  1:03 PM CST -----  Regarding: Scheduling Request  Contact: [phone]  Scheduling Request           Appt Type:  Red eye and a lot of pain     Date/Time Preference: today     Treating Provider: N/A     Caller Name: Hortencia Vegao Pt Daughter     Contact Preference: [phone]     Comments/notes: Pt is in a lot of pain and it only seems to be getting worse.

## 2024-11-13 ENCOUNTER — TELEPHONE (OUTPATIENT)
Dept: OPHTHALMOLOGY | Facility: CLINIC | Age: 89
End: 2024-11-13
Payer: MEDICARE

## 2024-11-13 NOTE — TELEPHONE ENCOUNTER
----- Message from Natalie sent at 11/13/2024 10:22 AM CST -----  Regarding: Appt  Contact: Pt  [phone]              Current Appt date:  11/13/24     Type of Appt: Urgent      Physician:  Alexander Shultz MD     Reason for rescheduling: Had no one to stay with pt's spouse her grandmother today she can not be left alone would like moved to tomorrow afternoon      Caller: Jacquelyn Howell's granddaughter      Contact Preference:  [phone]

## 2024-11-16 ENCOUNTER — NURSE TRIAGE (OUTPATIENT)
Dept: ADMINISTRATIVE | Facility: CLINIC | Age: 89
End: 2024-11-16
Payer: MEDICARE

## 2024-11-16 NOTE — TELEPHONE ENCOUNTER
Daughter calling on behalf of patient who is not present currently. She reports patient is being treated for eye symptoms but she does not believe the treatment is working. Reports eye is red and painful. I have explained the triage process to her and asked if I could call patient directly. She does not believe patient will be able to accurately answer questions. I have advised she call back when she was present with the patient. ED precautions provided.    Reason for Disposition   [1] Caller is not with the adult (patient) AND [2] probable NON-URGENT symptoms    Protocols used: Information Only Call - No Triage-A-

## 2024-11-18 ENCOUNTER — OFFICE VISIT (OUTPATIENT)
Dept: OPHTHALMOLOGY | Facility: CLINIC | Age: 89
End: 2024-11-18
Payer: MEDICARE

## 2024-11-18 ENCOUNTER — CLINICAL SUPPORT (OUTPATIENT)
Dept: OPHTHALMOLOGY | Facility: CLINIC | Age: 89
End: 2024-11-18
Payer: MEDICARE

## 2024-11-18 DIAGNOSIS — H57.12 BLIND PAINFUL LEFT EYE: ICD-10-CM

## 2024-11-18 DIAGNOSIS — H35.3211 EXUDATIVE AGE-RELATED MACULAR DEGENERATION, RIGHT EYE, WITH ACTIVE CHOROIDAL NEOVASCULARIZATION: Primary | ICD-10-CM

## 2024-11-18 DIAGNOSIS — H35.3222 EXUDATIVE AGE-RELATED MACULAR DEGENERATION, LEFT EYE, WITH INACTIVE CHOROIDAL NEOVASCULARIZATION: ICD-10-CM

## 2024-11-18 DIAGNOSIS — H43.21 ASTEROID HYALOSIS, RIGHT: ICD-10-CM

## 2024-11-18 DIAGNOSIS — H33.22 RETINAL DETACHMENT, LEFT: ICD-10-CM

## 2024-11-18 DIAGNOSIS — H54.40 BLIND PAINFUL LEFT EYE: ICD-10-CM

## 2024-11-18 PROCEDURE — 99999 PR PBB SHADOW E&M-EST. PATIENT-LVL III: CPT | Mod: PBBFAC,,, | Performed by: OPHTHALMOLOGY

## 2024-11-18 PROCEDURE — 1126F AMNT PAIN NOTED NONE PRSNT: CPT | Mod: CPTII,S$GLB,, | Performed by: OPHTHALMOLOGY

## 2024-11-18 PROCEDURE — 67028 INJECTION EYE DRUG: CPT | Mod: RT,S$GLB,, | Performed by: OPHTHALMOLOGY

## 2024-11-18 PROCEDURE — 1160F RVW MEDS BY RX/DR IN RCRD: CPT | Mod: CPTII,S$GLB,, | Performed by: OPHTHALMOLOGY

## 2024-11-18 PROCEDURE — 99214 OFFICE O/P EST MOD 30 MIN: CPT | Mod: 25,S$GLB,, | Performed by: OPHTHALMOLOGY

## 2024-11-18 PROCEDURE — 1101F PT FALLS ASSESS-DOCD LE1/YR: CPT | Mod: CPTII,S$GLB,, | Performed by: OPHTHALMOLOGY

## 2024-11-18 PROCEDURE — 92134 CPTRZ OPH DX IMG PST SGM RTA: CPT | Mod: S$GLB,,, | Performed by: OPHTHALMOLOGY

## 2024-11-18 PROCEDURE — 3288F FALL RISK ASSESSMENT DOCD: CPT | Mod: CPTII,S$GLB,, | Performed by: OPHTHALMOLOGY

## 2024-11-18 PROCEDURE — 1159F MED LIST DOCD IN RCRD: CPT | Mod: CPTII,S$GLB,, | Performed by: OPHTHALMOLOGY

## 2024-11-18 RX ORDER — PREDNISOLONE ACETATE 10 MG/ML
1 SUSPENSION/ DROPS OPHTHALMIC 2 TIMES DAILY
Qty: 15 ML | Refills: 1 | Status: SHIPPED | OUTPATIENT
Start: 2024-11-18 | End: 2025-11-18

## 2024-11-18 RX ADMIN — Medication 1.25 MG: at 02:11

## 2024-11-18 NOTE — PROGRESS NOTES
Subjective:       Patient ID: Tremayne Fortune is a 89 y.o. male      Chief Complaint   Patient presents with    Eye Pain     History of Present Illness  HPI     Eye Pain    In left eye.  Onset was unknown.  Severity is severe.  This started 1 week   ago.  Occurring intermittently.  It is worse throughout the day.  Since   onset it is stable.  Treatments tried include artificial tears.           Comments    URGENT:: Eye Pain OS and eye irritation OS      CC: pt c/o eye irritation and soreness OS >OD 1 wk pain OS.    ++blurred Va OS >OD due to eye condition  --diplopia  --eye pain   --flashes/++floaters OS  --headaches  --curtain/shadows/veils    Eye Meds: PF OS q3hr    POHx:   1. Exudative ARMD OS w/ active choroidal NVO OS  2. PCO OD            Last edited by Alexander Shultz MD on 11/18/2024  2:09 PM.        Imaging:    See report    Assessment/Plan:       1. Exudative age-related macular degeneration, right eye, with active choroidal neovascularization  Lost to f/u for over a year  Recurrent SRF with exudative material on OCT and decreased vision    Discussed pathology in detail.  Recommend Avastin OD today.  Discussed RBA inc endophthalmitis  Discussed injection protocol, TREX, and visual expectations    - Posterior Segment OCT Retina-Both eyes    2. Macular subretinal hemorrhage, left  Had large heme as presenting sign/symptom of wet AMD OS.    Had surgical displacement and subsequent RD  Given prior submac heme and mac off RD, has very low potential.  Recommend observation    3. Retinal detachment, left  Still with stable inferior traction.  Recommend continued obs  RD precautions    4. Exudative age-related macular degeneration, left eye, with inactive choroidal neovascularization  See #2    5. Asteroid hyalosis, right  Observe     6. Blind, painful eye left  Trial of PF 0/2    Follow up in about 1 month (around 12/18/2024), or if symptoms worsen or fail to improve, for OCT and INJECTION ONLY,  Injection Right eye, Avastin.      Patient identified.  Timeout performed.    Risks, benefits, and alternatives to treatment were discussed in detail with the patient, including bleeding/infection (endophthalmitis)/etc.  The patient voiced understanding and wished to proceed with the procedure.  See separate consent form.    Injection Procedure Note:  Diagnosis: WET AMD Right Eye    Topical Proparacaine drop placed then topical 5% Betadine  2% SC lidocaine injected inferior  Sterile gloves used, and sterile lid speculum placed.  5% Betadine placed at injection site again prior to injection.  Avastin 1.25mg in 0.05cc Injected inferotemporally 3.5-4mm posterior to the limbus.  Complications: None  Va at least CF at 5 feet post injection.  Retina, ONH, IOP normal after injection.    Followup as above.  Patient should return immediately PRN.  Retinal Detachment and Endophthalmitis precautions given.

## 2024-11-27 ENCOUNTER — TELEPHONE (OUTPATIENT)
Dept: OPTOMETRY | Facility: CLINIC | Age: 89
End: 2024-11-27
Payer: MEDICARE

## 2024-11-27 NOTE — TELEPHONE ENCOUNTER
----- Message from Med  April sent at 11/27/2024  4:34 PM CST -----  Regarding: FW: appointment access  Contact: [phone]    ----- Message -----  From: Mayo Veronica  Sent: 11/27/2024   4:26 PM CST  To: [facility] Ophthalmology Triage  Subject: appointment access                               Pt daughter is calling because father eyes are red ansd irratated and infected . Pt needs to be seen . Pt would like to come in for an appointment on Monday . Please contact pt .     Tremayne Fortune   [phone]    Please contact pt

## 2024-11-27 NOTE — TELEPHONE ENCOUNTER
Spoke to Daughter made pt an appt on Monday, specified if pain got worse they need to proceed to the ER @ main campus Do not go to [facility]

## 2024-12-01 ENCOUNTER — PATIENT MESSAGE (OUTPATIENT)
Dept: OPHTHALMOLOGY | Facility: CLINIC | Age: 89
End: 2024-12-01
Payer: MEDICARE

## 2024-12-01 RX ORDER — ATROPINE SULFATE 10 MG/ML
1 SOLUTION/ DROPS OPHTHALMIC 2 TIMES DAILY
Qty: 5 ML | Refills: 1 | Status: SHIPPED | OUTPATIENT
Start: 2024-12-01

## 2024-12-16 ENCOUNTER — PROCEDURE VISIT (OUTPATIENT)
Dept: OPHTHALMOLOGY | Facility: CLINIC | Age: 89
End: 2024-12-16
Attending: OPHTHALMOLOGY
Payer: MEDICARE

## 2024-12-16 DIAGNOSIS — H35.3211 EXUDATIVE AGE-RELATED MACULAR DEGENERATION, RIGHT EYE, WITH ACTIVE CHOROIDAL NEOVASCULARIZATION: Primary | ICD-10-CM

## 2024-12-16 PROCEDURE — 67028 INJECTION EYE DRUG: CPT | Mod: RT,S$GLB,, | Performed by: OPHTHALMOLOGY

## 2024-12-16 PROCEDURE — 92134 CPTRZ OPH DX IMG PST SGM RTA: CPT | Mod: S$GLB,,, | Performed by: OPHTHALMOLOGY

## 2024-12-16 PROCEDURE — 99499 UNLISTED E&M SERVICE: CPT | Mod: S$GLB,,, | Performed by: OPHTHALMOLOGY

## 2024-12-16 RX ORDER — PREDNISOLONE ACETATE 10 MG/ML
1 SUSPENSION/ DROPS OPHTHALMIC 2 TIMES DAILY
Qty: 15 ML | Refills: 1 | Status: SHIPPED | OUTPATIENT
Start: 2024-12-16 | End: 2025-12-16

## 2024-12-16 RX ADMIN — Medication 1.25 MG: at 02:12

## 2024-12-20 NOTE — PROGRESS NOTES
Subjective:       Patient ID: Tremayne Fortune is a 89 y.o. male      Chief Complaint   Patient presents with    Macular Degeneration     History of Present Illness  HPI    1 month Oct/Dfe     Pt reports no pain today but states va has gotten worse and redness is   still present in his os.     Pt states he has been using his drops as directed.     Eye meds:  PF : Every 3 hours   Last edited by Alexander Shultz MD on 12/20/2024  9:33 AM.        Imaging:    See report    Assessment/Plan:       1. Exudative age-related macular degeneration, right eye, with active choroidal neovascularization  Lost to f/u for over a year  Recurrent SRF with exudative material on OCT and decreased vision Nov 2024    Resolution of fluid OD today 1 mo s/p Av restart    Rec Av OD today and TREX to 6 wks    Discussed pathology in detail.  Recommend Avastin OD today.  Discussed RBA inc endophthalmitis  Discussed injection protocol, TREX, and visual expectations    - Posterior Segment OCT Retina-Both eyes    Cont PF 0/2 as has relief of pain from last visit    Follow up in about 6 weeks (around 1/27/2025), or if symptoms worsen or fail to improve, for OCT and INJECTION ONLY, Injection Right eye, Avastin.      Patient identified.  Timeout performed.    Risks, benefits, and alternatives to treatment were discussed in detail with the patient, including bleeding/infection (endophthalmitis)/etc.  The patient voiced understanding and wished to proceed with the procedure.  See separate consent form.    Injection Procedure Note:  Diagnosis: WET AMD Right Eye    Topical Proparacaine drop placed then topical 5% Betadine  2% SC lidocaine injected inferior  Sterile gloves used, and sterile lid speculum placed.  5% Betadine placed at injection site again prior to injection.  Avastin 1.25mg in 0.05cc Injected inferotemporally 3.5-4mm posterior to the limbus.  Complications: None  Va at least CF at 5 feet post injection.  Retina, ONH, IOP normal  after injection.    Followup as above.  Patient should return immediately PRN.  Retinal Detachment and Endophthalmitis precautions given.

## 2024-12-24 ENCOUNTER — TELEPHONE (OUTPATIENT)
Dept: OPHTHALMOLOGY | Facility: CLINIC | Age: 89
End: 2024-12-24
Payer: MEDICARE

## 2024-12-24 NOTE — TELEPHONE ENCOUNTER
Called pt bc he was scheduled for an urgent appt. Pt no showed so I called him and spoke to the patient , he stated he did not need an appt and told me to cancel.     I called daughter who made the urgent appt several times and a voicemail but was unable to reach her.

## 2025-02-27 ENCOUNTER — HOSPITAL ENCOUNTER (OUTPATIENT)
Facility: HOSPITAL | Age: OVER 89
Discharge: HOME HEALTH | End: 2025-03-01
Attending: STUDENT IN AN ORGANIZED HEALTH CARE EDUCATION/TRAINING PROGRAM
Payer: MEDICARE

## 2025-02-27 DIAGNOSIS — R41.0 CONFUSION: Primary | ICD-10-CM

## 2025-02-27 DIAGNOSIS — R07.9 CHEST PAIN: ICD-10-CM

## 2025-02-27 DIAGNOSIS — R53.1 WEAKNESS: ICD-10-CM

## 2025-02-27 DIAGNOSIS — L30.8 DERMATITIS ASSOCIATED WITH MOISTURE: ICD-10-CM

## 2025-02-27 DIAGNOSIS — G93.41 ENCEPHALOPATHY, METABOLIC: ICD-10-CM

## 2025-02-27 LAB
ALBUMIN SERPL BCP-MCNC: 3.6 G/DL (ref 3.5–5.2)
ALP SERPL-CCNC: 117 U/L (ref 40–150)
ALT SERPL W/O P-5'-P-CCNC: 38 U/L (ref 10–44)
ANION GAP SERPL CALC-SCNC: 10 MMOL/L (ref 8–16)
AST SERPL-CCNC: 46 U/L (ref 10–40)
BACTERIA #/AREA URNS HPF: NORMAL /HPF
BASOPHILS # BLD AUTO: 0.01 K/UL (ref 0–0.2)
BASOPHILS NFR BLD: 0.1 % (ref 0–1.9)
BILIRUB SERPL-MCNC: 1.1 MG/DL (ref 0.1–1)
BILIRUB UR QL STRIP: NEGATIVE
BUN SERPL-MCNC: 14 MG/DL (ref 8–23)
CALCIUM SERPL-MCNC: 8.7 MG/DL (ref 8.7–10.5)
CHLORIDE SERPL-SCNC: 107 MMOL/L (ref 95–110)
CK SERPL-CCNC: 801 U/L (ref 20–200)
CLARITY UR: CLEAR
CO2 SERPL-SCNC: 24 MMOL/L (ref 23–29)
COLOR UR: YELLOW
CREAT SERPL-MCNC: 0.8 MG/DL (ref 0.5–1.4)
DIFFERENTIAL METHOD BLD: ABNORMAL
EOSINOPHIL # BLD AUTO: 0.1 K/UL (ref 0–0.5)
EOSINOPHIL NFR BLD: 1.2 % (ref 0–8)
ERYTHROCYTE [DISTWIDTH] IN BLOOD BY AUTOMATED COUNT: 14.6 % (ref 11.5–14.5)
EST. GFR  (NO RACE VARIABLE): >60 ML/MIN/1.73 M^2
GLUCOSE SERPL-MCNC: 100 MG/DL (ref 70–110)
GLUCOSE UR QL STRIP: NEGATIVE
HCT VFR BLD AUTO: 39.5 % (ref 40–54)
HGB BLD-MCNC: 13.5 G/DL (ref 14–18)
HGB UR QL STRIP: NEGATIVE
HYALINE CASTS #/AREA URNS LPF: 0 /LPF
IMM GRANULOCYTES # BLD AUTO: 0.02 K/UL (ref 0–0.04)
IMM GRANULOCYTES NFR BLD AUTO: 0.2 % (ref 0–0.5)
KETONES UR QL STRIP: NEGATIVE
LEUKOCYTE ESTERASE UR QL STRIP: NEGATIVE
LYMPHOCYTES # BLD AUTO: 0.9 K/UL (ref 1–4.8)
LYMPHOCYTES NFR BLD: 10.7 % (ref 18–48)
MAGNESIUM SERPL-MCNC: 2.1 MG/DL (ref 1.6–2.6)
MCH RBC QN AUTO: 31.3 PG (ref 27–31)
MCHC RBC AUTO-ENTMCNC: 34.2 G/DL (ref 32–36)
MCV RBC AUTO: 91 FL (ref 82–98)
MICROSCOPIC COMMENT: NORMAL
MONOCYTES # BLD AUTO: 0.6 K/UL (ref 0.3–1)
MONOCYTES NFR BLD: 6.8 % (ref 4–15)
NEUTROPHILS # BLD AUTO: 6.6 K/UL (ref 1.8–7.7)
NEUTROPHILS NFR BLD: 81 % (ref 38–73)
NITRITE UR QL STRIP: NEGATIVE
NRBC BLD-RTO: 0 /100 WBC
PH UR STRIP: 7 [PH] (ref 5–8)
PHOSPHATE SERPL-MCNC: 3.2 MG/DL (ref 2.7–4.5)
PLATELET # BLD AUTO: 161 K/UL (ref 150–450)
PMV BLD AUTO: 9 FL (ref 9.2–12.9)
POCT GLUCOSE: 104 MG/DL (ref 70–110)
POTASSIUM SERPL-SCNC: 3.8 MMOL/L (ref 3.5–5.1)
PROT SERPL-MCNC: 7.3 G/DL (ref 6–8.4)
PROT UR QL STRIP: ABNORMAL
RBC # BLD AUTO: 4.32 M/UL (ref 4.6–6.2)
RBC #/AREA URNS HPF: 3 /HPF (ref 0–4)
SODIUM SERPL-SCNC: 141 MMOL/L (ref 136–145)
SP GR UR STRIP: 1.02 (ref 1–1.03)
URN SPEC COLLECT METH UR: ABNORMAL
UROBILINOGEN UR STRIP-ACNC: NEGATIVE EU/DL
WBC # BLD AUTO: 8.2 K/UL (ref 3.9–12.7)
WBC #/AREA URNS HPF: 1 /HPF (ref 0–5)

## 2025-02-27 PROCEDURE — 83735 ASSAY OF MAGNESIUM: CPT | Performed by: EMERGENCY MEDICINE

## 2025-02-27 PROCEDURE — 84100 ASSAY OF PHOSPHORUS: CPT | Performed by: EMERGENCY MEDICINE

## 2025-02-27 PROCEDURE — 93005 ELECTROCARDIOGRAM TRACING: CPT

## 2025-02-27 PROCEDURE — 80053 COMPREHEN METABOLIC PANEL: CPT | Performed by: EMERGENCY MEDICINE

## 2025-02-27 PROCEDURE — 99285 EMERGENCY DEPT VISIT HI MDM: CPT | Mod: 25

## 2025-02-27 PROCEDURE — 81000 URINALYSIS NONAUTO W/SCOPE: CPT | Performed by: EMERGENCY MEDICINE

## 2025-02-27 PROCEDURE — 82962 GLUCOSE BLOOD TEST: CPT

## 2025-02-27 PROCEDURE — 85025 COMPLETE CBC W/AUTO DIFF WBC: CPT | Performed by: EMERGENCY MEDICINE

## 2025-02-27 PROCEDURE — 82550 ASSAY OF CK (CPK): CPT | Performed by: EMERGENCY MEDICINE

## 2025-02-27 PROCEDURE — 93010 ELECTROCARDIOGRAM REPORT: CPT | Mod: ,,, | Performed by: INTERNAL MEDICINE
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
## 2025-02-28 PROBLEM — L30.8 DERMATITIS ASSOCIATED WITH MOISTURE: Status: ACTIVE | Noted: 2025-02-28

## 2025-02-28 PROBLEM — G93.41 ENCEPHALOPATHY, METABOLIC: Status: ACTIVE | Noted: 2025-02-28

## 2025-02-28 LAB
ALBUMIN SERPL BCP-MCNC: 3.3 G/DL (ref 3.5–5.2)
ALP SERPL-CCNC: 111 U/L (ref 40–150)
ALT SERPL W/O P-5'-P-CCNC: 36 U/L (ref 10–44)
ANION GAP SERPL CALC-SCNC: 12 MMOL/L (ref 8–16)
AST SERPL-CCNC: 54 U/L (ref 10–40)
BASOPHILS # BLD AUTO: 0.01 K/UL (ref 0–0.2)
BASOPHILS NFR BLD: 0.1 % (ref 0–1.9)
BILIRUB SERPL-MCNC: 1.4 MG/DL (ref 0.1–1)
BUN SERPL-MCNC: 13 MG/DL (ref 8–23)
CALCIUM SERPL-MCNC: 8.5 MG/DL (ref 8.7–10.5)
CHLORIDE SERPL-SCNC: 106 MMOL/L (ref 95–110)
CO2 SERPL-SCNC: 20 MMOL/L (ref 23–29)
CREAT SERPL-MCNC: 0.7 MG/DL (ref 0.5–1.4)
CRP SERPL-MCNC: 47.7 MG/L (ref 0–8.2)
DIFFERENTIAL METHOD BLD: ABNORMAL
EOSINOPHIL # BLD AUTO: 0.1 K/UL (ref 0–0.5)
EOSINOPHIL NFR BLD: 1.7 % (ref 0–8)
ERYTHROCYTE [DISTWIDTH] IN BLOOD BY AUTOMATED COUNT: 14.7 % (ref 11.5–14.5)
EST. GFR  (NO RACE VARIABLE): >60 ML/MIN/1.73 M^2
GLUCOSE SERPL-MCNC: 100 MG/DL (ref 70–110)
HCT VFR BLD AUTO: 39.5 % (ref 40–54)
HGB BLD-MCNC: 12.9 G/DL (ref 14–18)
IMM GRANULOCYTES # BLD AUTO: 0.04 K/UL (ref 0–0.04)
IMM GRANULOCYTES NFR BLD AUTO: 0.5 % (ref 0–0.5)
LYMPHOCYTES # BLD AUTO: 1.1 K/UL (ref 1–4.8)
LYMPHOCYTES NFR BLD: 13.3 % (ref 18–48)
MAGNESIUM SERPL-MCNC: 2.1 MG/DL (ref 1.6–2.6)
MCH RBC QN AUTO: 30.8 PG (ref 27–31)
MCHC RBC AUTO-ENTMCNC: 32.7 G/DL (ref 32–36)
MCV RBC AUTO: 94 FL (ref 82–98)
MONOCYTES # BLD AUTO: 0.6 K/UL (ref 0.3–1)
MONOCYTES NFR BLD: 7.6 % (ref 4–15)
NEUTROPHILS # BLD AUTO: 6.2 K/UL (ref 1.8–7.7)
NEUTROPHILS NFR BLD: 76.8 % (ref 38–73)
NRBC BLD-RTO: 0 /100 WBC
OHS QRS DURATION: 84 MS
OHS QTC CALCULATION: 488 MS
PHOSPHATE SERPL-MCNC: 3.2 MG/DL (ref 2.7–4.5)
PLATELET # BLD AUTO: 162 K/UL (ref 150–450)
PMV BLD AUTO: 9.1 FL (ref 9.2–12.9)
POCT GLUCOSE: 111 MG/DL (ref 70–110)
POTASSIUM SERPL-SCNC: 3.6 MMOL/L (ref 3.5–5.1)
PROT SERPL-MCNC: 6.7 G/DL (ref 6–8.4)
RBC # BLD AUTO: 4.19 M/UL (ref 4.6–6.2)
SODIUM SERPL-SCNC: 138 MMOL/L (ref 136–145)
WBC # BLD AUTO: 8.07 K/UL (ref 3.9–12.7)

## 2025-02-28 PROCEDURE — 63600175 PHARM REV CODE 636 W HCPCS: Performed by: STUDENT IN AN ORGANIZED HEALTH CARE EDUCATION/TRAINING PROGRAM

## 2025-02-28 PROCEDURE — 25000003 PHARM REV CODE 250: Performed by: STUDENT IN AN ORGANIZED HEALTH CARE EDUCATION/TRAINING PROGRAM

## 2025-02-28 PROCEDURE — 83735 ASSAY OF MAGNESIUM: CPT | Performed by: STUDENT IN AN ORGANIZED HEALTH CARE EDUCATION/TRAINING PROGRAM

## 2025-02-28 PROCEDURE — 86140 C-REACTIVE PROTEIN: CPT | Performed by: NURSE PRACTITIONER

## 2025-02-28 PROCEDURE — 97161 PT EVAL LOW COMPLEX 20 MIN: CPT

## 2025-02-28 PROCEDURE — G0378 HOSPITAL OBSERVATION PER HR: HCPCS

## 2025-02-28 PROCEDURE — 97530 THERAPEUTIC ACTIVITIES: CPT

## 2025-02-28 PROCEDURE — 96360 HYDRATION IV INFUSION INIT: CPT

## 2025-02-28 PROCEDURE — 80053 COMPREHEN METABOLIC PANEL: CPT | Performed by: STUDENT IN AN ORGANIZED HEALTH CARE EDUCATION/TRAINING PROGRAM

## 2025-02-28 PROCEDURE — 96372 THER/PROPH/DIAG INJ SC/IM: CPT | Performed by: STUDENT IN AN ORGANIZED HEALTH CARE EDUCATION/TRAINING PROGRAM

## 2025-02-28 PROCEDURE — 36415 COLL VENOUS BLD VENIPUNCTURE: CPT | Performed by: NURSE PRACTITIONER

## 2025-02-28 PROCEDURE — 97165 OT EVAL LOW COMPLEX 30 MIN: CPT

## 2025-02-28 PROCEDURE — 51798 US URINE CAPACITY MEASURE: CPT

## 2025-02-28 PROCEDURE — 85025 COMPLETE CBC W/AUTO DIFF WBC: CPT | Performed by: STUDENT IN AN ORGANIZED HEALTH CARE EDUCATION/TRAINING PROGRAM

## 2025-02-28 PROCEDURE — 84100 ASSAY OF PHOSPHORUS: CPT | Performed by: STUDENT IN AN ORGANIZED HEALTH CARE EDUCATION/TRAINING PROGRAM

## 2025-02-28 PROCEDURE — 99204 OFFICE O/P NEW MOD 45 MIN: CPT | Mod: ,,, | Performed by: INTERNAL MEDICINE

## 2025-02-28 PROCEDURE — 95819 EEG AWAKE AND ASLEEP: CPT

## 2025-02-28 PROCEDURE — 95819 EEG AWAKE AND ASLEEP: CPT | Mod: 26,,, | Performed by: INTERNAL MEDICINE

## 2025-02-28 RX ORDER — ACETAMINOPHEN 325 MG/1
650 TABLET ORAL EVERY 4 HOURS PRN
Status: DISCONTINUED | OUTPATIENT
Start: 2025-02-28 | End: 2025-03-01 | Stop reason: HOSPADM

## 2025-02-28 RX ORDER — ASPIRIN 325 MG
325 TABLET, DELAYED RELEASE (ENTERIC COATED) ORAL DAILY
Status: DISCONTINUED | OUTPATIENT
Start: 2025-02-28 | End: 2025-03-01 | Stop reason: HOSPADM

## 2025-02-28 RX ORDER — LOSARTAN POTASSIUM 25 MG/1
50 TABLET ORAL DAILY
Status: DISCONTINUED | OUTPATIENT
Start: 2025-02-28 | End: 2025-03-01 | Stop reason: HOSPADM

## 2025-02-28 RX ORDER — TALC
6 POWDER (GRAM) TOPICAL NIGHTLY PRN
Status: DISCONTINUED | OUTPATIENT
Start: 2025-02-28 | End: 2025-03-01 | Stop reason: HOSPADM

## 2025-02-28 RX ORDER — ISOSORBIDE MONONITRATE 30 MG/1
30 TABLET, EXTENDED RELEASE ORAL DAILY
Status: DISCONTINUED | OUTPATIENT
Start: 2025-02-28 | End: 2025-03-01 | Stop reason: HOSPADM

## 2025-02-28 RX ORDER — SIMETHICONE 80 MG
1 TABLET,CHEWABLE ORAL 4 TIMES DAILY PRN
Status: DISCONTINUED | OUTPATIENT
Start: 2025-02-28 | End: 2025-03-01 | Stop reason: HOSPADM

## 2025-02-28 RX ORDER — SODIUM,POTASSIUM PHOSPHATES 280-250MG
2 POWDER IN PACKET (EA) ORAL
Status: DISCONTINUED | OUTPATIENT
Start: 2025-02-28 | End: 2025-03-01 | Stop reason: HOSPADM

## 2025-02-28 RX ORDER — IBUPROFEN 200 MG
24 TABLET ORAL
Status: DISCONTINUED | OUTPATIENT
Start: 2025-02-28 | End: 2025-03-01 | Stop reason: HOSPADM

## 2025-02-28 RX ORDER — NALOXONE HCL 0.4 MG/ML
0.02 VIAL (ML) INJECTION
Status: DISCONTINUED | OUTPATIENT
Start: 2025-02-28 | End: 2025-03-01 | Stop reason: HOSPADM

## 2025-02-28 RX ORDER — ENOXAPARIN SODIUM 100 MG/ML
40 INJECTION SUBCUTANEOUS EVERY 24 HOURS
Status: DISCONTINUED | OUTPATIENT
Start: 2025-02-28 | End: 2025-03-01 | Stop reason: HOSPADM

## 2025-02-28 RX ORDER — ONDANSETRON HYDROCHLORIDE 2 MG/ML
4 INJECTION, SOLUTION INTRAVENOUS EVERY 8 HOURS PRN
Status: DISCONTINUED | OUTPATIENT
Start: 2025-02-28 | End: 2025-03-01 | Stop reason: HOSPADM

## 2025-02-28 RX ORDER — LANOLIN ALCOHOL/MO/W.PET/CERES
800 CREAM (GRAM) TOPICAL
Status: DISCONTINUED | OUTPATIENT
Start: 2025-02-28 | End: 2025-03-01 | Stop reason: HOSPADM

## 2025-02-28 RX ORDER — METOPROLOL SUCCINATE 50 MG/1
50 TABLET, EXTENDED RELEASE ORAL 2 TIMES DAILY
Status: DISCONTINUED | OUTPATIENT
Start: 2025-02-28 | End: 2025-03-01 | Stop reason: HOSPADM

## 2025-02-28 RX ORDER — AMLODIPINE BESYLATE 5 MG/1
5 TABLET ORAL NIGHTLY
Status: DISCONTINUED | OUTPATIENT
Start: 2025-02-28 | End: 2025-03-01 | Stop reason: HOSPADM

## 2025-02-28 RX ORDER — AMOXICILLIN 250 MG
1 CAPSULE ORAL DAILY PRN
Status: DISCONTINUED | OUTPATIENT
Start: 2025-02-28 | End: 2025-03-01 | Stop reason: HOSPADM

## 2025-02-28 RX ORDER — EZETIMIBE 10 MG/1
10 TABLET ORAL DAILY
Status: DISCONTINUED | OUTPATIENT
Start: 2025-02-28 | End: 2025-03-01 | Stop reason: HOSPADM

## 2025-02-28 RX ORDER — GLUCAGON 1 MG
1 KIT INJECTION
Status: DISCONTINUED | OUTPATIENT
Start: 2025-02-28 | End: 2025-03-01 | Stop reason: HOSPADM

## 2025-02-28 RX ORDER — IBUPROFEN 200 MG
16 TABLET ORAL
Status: DISCONTINUED | OUTPATIENT
Start: 2025-02-28 | End: 2025-03-01 | Stop reason: HOSPADM

## 2025-02-28 RX ORDER — SODIUM CHLORIDE 0.9 % (FLUSH) 0.9 %
10 SYRINGE (ML) INJECTION EVERY 12 HOURS PRN
Status: DISCONTINUED | OUTPATIENT
Start: 2025-02-28 | End: 2025-03-01 | Stop reason: HOSPADM

## 2025-02-28 RX ORDER — ALUMINUM HYDROXIDE, MAGNESIUM HYDROXIDE, AND SIMETHICONE 1200; 120; 1200 MG/30ML; MG/30ML; MG/30ML
30 SUSPENSION ORAL 4 TIMES DAILY PRN
Status: DISCONTINUED | OUTPATIENT
Start: 2025-02-28 | End: 2025-03-01 | Stop reason: HOSPADM

## 2025-02-28 RX ORDER — PROCHLORPERAZINE EDISYLATE 5 MG/ML
5 INJECTION INTRAMUSCULAR; INTRAVENOUS EVERY 6 HOURS PRN
Status: DISCONTINUED | OUTPATIENT
Start: 2025-02-28 | End: 2025-03-01 | Stop reason: HOSPADM

## 2025-02-28 RX ORDER — ACETAMINOPHEN 325 MG/1
650 TABLET ORAL EVERY 8 HOURS PRN
Status: DISCONTINUED | OUTPATIENT
Start: 2025-02-28 | End: 2025-03-01 | Stop reason: HOSPADM

## 2025-02-28 RX ORDER — BISACODYL 10 MG/1
10 SUPPOSITORY RECTAL DAILY PRN
Status: DISCONTINUED | OUTPATIENT
Start: 2025-02-28 | End: 2025-03-01 | Stop reason: HOSPADM

## 2025-02-28 RX ORDER — ATORVASTATIN CALCIUM 10 MG/1
20 TABLET, FILM COATED ORAL DAILY
Status: DISCONTINUED | OUTPATIENT
Start: 2025-02-28 | End: 2025-03-01 | Stop reason: HOSPADM

## 2025-02-28 RX ADMIN — AMLODIPINE BESYLATE 5 MG: 5 TABLET ORAL at 01:02

## 2025-02-28 RX ADMIN — ATORVASTATIN CALCIUM 20 MG: 10 TABLET, FILM COATED ORAL at 08:02

## 2025-02-28 RX ADMIN — EZETIMIBE 10 MG: 10 TABLET ORAL at 08:02

## 2025-02-28 RX ADMIN — SODIUM CHLORIDE, POTASSIUM CHLORIDE, SODIUM LACTATE AND CALCIUM CHLORIDE 500 ML: 600; 310; 30; 20 INJECTION, SOLUTION INTRAVENOUS at 03:02

## 2025-02-28 RX ADMIN — ASPIRIN 325 MG: 325 TABLET, COATED ORAL at 08:02

## 2025-02-28 RX ADMIN — METOPROLOL SUCCINATE 50 MG: 50 TABLET, EXTENDED RELEASE ORAL at 08:02

## 2025-02-28 RX ADMIN — ENOXAPARIN SODIUM 40 MG: 40 INJECTION SUBCUTANEOUS at 04:02

## 2025-02-28 RX ADMIN — METOPROLOL SUCCINATE 50 MG: 50 TABLET, EXTENDED RELEASE ORAL at 09:02

## 2025-02-28 RX ADMIN — LOSARTAN POTASSIUM 50 MG: 25 TABLET, FILM COATED ORAL at 08:02

## 2025-02-28 RX ADMIN — AMLODIPINE BESYLATE 5 MG: 5 TABLET ORAL at 09:02

## 2025-02-28 RX ADMIN — ISOSORBIDE MONONITRATE 30 MG: 30 TABLET, EXTENDED RELEASE ORAL at 08:02

## 2025-02-28 NOTE — ASSESSMENT & PLAN NOTE
Patient's blood pressure range in the last 24 hours was: BP  Min: 134/63  Max: 158/72.The patient's inpatient anti-hypertensive regimen is listed below:  Current Antihypertensives  amLODIPine tablet 5 mg, Nightly, Oral  isosorbide mononitrate 24 hr tablet 30 mg, Daily, Oral  losartan tablet 50 mg, Daily, Oral  metoprolol succinate (TOPROL-XL) 24 hr tablet 50 mg, 2 times daily, Oral    Plan  - BP is controlled, no changes needed to their regimen     no no

## 2025-02-28 NOTE — PLAN OF CARE
02/28/25 0858   Discharge Planning   Assessment Type Discharge Planning Brief Assessment   Resource/Environmental Concerns none   Support Systems Spouse/significant other;Children;Family members   Equipment Currently Used at Home cane, straight   Current Living Arrangements home   Patient/Family Anticipates Transition to home with family   Patient/Family Anticipated Services at Transition none   DME Needed Upon Discharge  other (see comments)  (TBD)   Discharge Plan A Home with family  (with instructions to follow up)       [facility] [address] [facility] [employer] - [place] - [address]  [address] [place] [address]  Phone: [phone] Fax: [phone]

## 2025-02-28 NOTE — PROCEDURES
EEG,w/awake & drowsy record    Date/Time: 2/28/2025 1:22 PM    Performed by: Klever Juarez MD  Authorized by: Klever Juarez MD      ELECTROENCEPHALOGRAM REPORT    DATE OF SERVICE: 02/28/2025  EEG NUMBER: OW   LOCATION OF SERVICE: [facility]   Electroencephalographic (EEG) recording is with electrodes placed according to the International 10-20 placement system.  Thirty two (32) channels of digital signal (sampling rate of 512/sec) including T1 and T2 was simultaneously recorded from the scalp and may include  EKG, EMG, and/or eye monitors.  Recording band pass was 0.1 to 512 hz.  Digital video recording of the patient is simultaneously recorded with the EEG.  The patient is instructed report clinical symptoms which may occur during the recording session.  EEG and video recording is stored and archived in digital format. Activation procedures which include photic stimulation, hyperventilation and instructing patients to perform simple task are done in selected patients.    The EEG is displayed on a monitor screen and can be reviewed using different montages.  Computer assisted analysis is employed to detect spike and electrographic seizure activity.   The entire record is submitted for computer analysis.  The entire recording is visually reviewed and the times identified by computer analysis as being spikes or seizures are reviewed again.  Compresses spectral analysis (CSA) is also performed on the activity recorded from each individual channel.  This is displayed as a power display of frequencies from 0 to 30 Hz over time.   The CSA is reviewed looking for asymmetries in power between homologous areas of the scalp and then compared with the original EEG recording.     [employer] software was also utilized in the review of this study.  This software suite analyzes the EEG recording in multiple domains.  Coherence and rhythmicity is computed to identify EEG sections which may contain organized  seizures.  Each channel undergoes analysis to detect presence of spike and sharp waves which have special and morphological characteristic of epileptic activity.  The routine EEG recording is converted from spacial into frequency domain.  This is then displayed comparing homologous areas to identify areas of significant asymmetry.  Algorithm to identify non-cortically generated artifact is used to separate eye movement, EMG and other artifact from the EEG    EEG FINDINGS  During the maximally alert state, a 8 Hz posterior dominant rhythm was seen which was symmetrical, well-regulated and briskly attenuated to eye opening. In the more anterior head regions, symmetric frontocentral beta frequencies predominated.  Intermittent delta and theta range generalized slowing was noted throughout the record.  As drowsiness occurred, the posterior dominant rhythm attenuated, slow rolling eye movements appeared, and symmetrical vertex sharp transients were seen.  Stage N2 sleep was reached and was characterized proc by high amplitude K-complexes and 12-15 Hz symmetrical and synchronous frontocentral sleep spindles.    No epileptiform findings were noted, no electrographic seizures were seen, and no clinical events were reported.    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 There were no epileptiform findings, electrographic seizures, or no clinical events recorded.     Klever Juarez MD  Neurology  [facility] [place]

## 2025-02-28 NOTE — NURSING
[facility], [facility]  Nurses Note -- 4 Eyes      2/28/2025       Skin assessed on: Admit      [x] No Pressure Injuries Present    [x]Prevention Measures Documented  Wound care consult placed for redness in inner buttocks;/alter skin integrity. Placed foam dressing on buttocks.   BLE dry flaky/scaling skin    [] Yes LDA  for Pressure Injury Previously documented     [] Yes New Pressure Injury Discovered   [] LDA for New Pressure Injury Added      Attending RN:  Shanon Hernandez RN     Second RN:  Paulino Hollins RN

## 2025-02-28 NOTE — SUBJECTIVE & OBJECTIVE
Past Medical History:   Diagnosis Date    Colon cancer     Hypertension     Macular degeneration     Total retinal detachment of left eye 02/23/2021       Past Surgical History:   Procedure Laterality Date    CARDIAC SURGERY      4 vessel CABG    CATARACT EXTRACTION W/  INTRAOCULAR LENS IMPLANT Right 07/28/2016    Dr. Delgado    CATARACT EXTRACTION W/  INTRAOCULAR LENS IMPLANT Left 08/11/2016    Dr. Delgado     CHOLECYSTECTOMY      COLON SURGERY      colon resection    REPAIR OF RETINAL DETACHMENT WITH VITRECTOMY Left 02/23/2021    Procedure: REPAIR, RETINAL DETACHMENT, WITH VITRECTOMY/SCERAL BUCKLE/ LEFT EYE;  Surgeon: Alexander Shultz MD;  Location: [facility] OR [address] [place] [address];  Service: Ophthalmology;  Laterality: Left;    SCLERAL BUCKLING Left 02/23/2021    Procedure: SCLERAL BUCKLING;  Surgeon: Alexander Shultz MD;  Location: [facility] OR [address] [place] [address];  Service: Ophthalmology;  Laterality: Left;    VITRECTOMY BY PARS PLANA APPROACH Left 12/15/2020    Procedure: VITRECTOMY, PARS PLANA APPROACH, injection of subretinal tpa, injection of gas, left eye;  Surgeon: Alexander Shultz MD;  Location: [facility] OR [address] [place] [address];  Service: Ophthalmology;  Laterality: Left;  pt can't arrive until 9 or 10am       Review of patient's allergies indicates:   Allergen Reactions    Shellfish containing products Hives and Nausea And Vomiting              Current Facility-Administered Medications on File Prior to Encounter   Medication    atropine 1% ophthalmic solution 1 drop    moxifloxacin 0.5 % ophthalmic solution 1 drop    phenylephrine HCL 2.5% ophthalmic solution 1 drop    prednisoLONE acetate 1 % ophthalmic suspension 1 drop    tetracaine HCl (PF) 0.5 % Drop 1 drop    tobramycin-dexamethasone 0.3-0.1% ophthalmic ointment    tropicamide 1% ophthalmic solution 1 drop     Current Outpatient Medications on File Prior to Encounter   Medication Sig    acetaminophen (TYLENOL) 325 MG tablet Take 1 tablet (325 mg total) by mouth every 6 (six)  hours as needed for Pain.    amLODIPine (NORVASC) 5 MG tablet Take 5 mg by mouth nightly.     aspirin (ECOTRIN) 325 MG EC tablet Take 325 mg by mouth once daily.    atropine 1% (ISOPTO ATROPINE) 1 % Drop Place 1 drop into the left eye 2 (two) times a day.    colestipol (COLESTID) 1 gram Tab 1 g 2 (two) times daily.     ezetimibe (ZETIA) 10 mg tablet Take 10 mg by mouth once daily.    fish oil-omega-3 fatty acids 300-1,000 mg capsule Take 1 capsule by mouth 2 (two) times a day.     isosorbide mononitrate (IMDUR) 30 MG 24 hr tablet Take 30 mg by mouth once daily.    losartan (COZAAR) 50 MG tablet Take 50 mg by mouth once daily.    metoprolol succinate (TOPROL-XL) 50 MG 24 hr tablet 50 mg 2 (two) times daily.    multivitamin capsule Take 1 capsule by mouth once daily.    omega 3-dha-epa-fish oil (FISH OIL) 1,200 (144-216) mg Cap 1 capsule.    prednisoLONE acetate (PRED FORTE) 1 % DrpS Place 1 drop into the left eye 2 (two) times daily.    psyllium husk 0.4 gram Cap Take 1 capsule by mouth 4 (four) times daily.    rosuvastatin (CRESTOR) 5 MG tablet Take 5 mg by mouth every evening.     vit C/E/Zn/coppr/lutein/zeaxan (PRESERVISION AREDS-2 ORAL) Take 1 capsule by mouth 2 (two) times a day.     Family History       Problem Relation (Age of Onset)    No Known Problems Mother, Father, Sister, Brother, Maternal Aunt, Maternal Uncle, Paternal Aunt, Paternal Uncle, Maternal Grandmother, Maternal Grandfather, Paternal Grandmother, Paternal Grandfather          Tobacco Use    Smoking status: Former     Current packs/day: 0.00     Types: Cigarettes     Quit date: 1996     Years since quittin.6     Passive exposure: Past    Smokeless tobacco: Never   Substance and Sexual Activity    Alcohol use: No    Drug use: No    Sexual activity: Not Currently     Partners: Female     Review of Systems   Unable to perform ROS: Mental status change     Objective:     Vital Signs (Most Recent):  Temp: 98.7 °F (37.1 °C) (25  0005)  Pulse: 94 (02/28/25 0003)  Resp: 19 (02/27/25 2332)  BP: (!) 152/70 (02/28/25 0000)  SpO2: 95 % (02/28/25 0005) Vital Signs (24h Range):  Temp:  [98.7 °F (37.1 °C)-99 °F (37.2 °C)] 98.7 °F (37.1 °C)  Pulse:  [] 94  Resp:  [16-23] 19  SpO2:  [92 %-96 %] 95 %  BP: (134-158)/() 152/70     Weight: 78.9 kg (174 lb)  Body mass index is 28.08 kg/m².     Physical Exam  Vitals and nursing note reviewed.   Constitutional:       General: He is not in acute distress.     Appearance: He is not ill-appearing.   HENT:      Mouth/Throat:      Mouth: Mucous membranes are moist.   Cardiovascular:      Rate and Rhythm: Normal rate.   Pulmonary:      Effort: Pulmonary effort is normal.   Abdominal:      General: Abdomen is flat.   Skin:     General: Skin is warm.   Neurological:      Mental Status: He is alert.      Comments: A&Ox2.                 Significant Labs: All pertinent labs within the past 24 hours have been reviewed.    Significant Imaging: I have reviewed all pertinent imaging results/findings within the past 24 hours.

## 2025-02-28 NOTE — CARE UPDATE
Admitted for encephalopathy metabolic    In the ED, the patient was hemodynamically stable. Labs were remarkable for normocytic anemia (13.5), mildly elevated T bili (1.1), elevated AST (46). UA was unremarkable. CT head/cervical spine showed no acute process, with moderate of degenerative changes at C5/C6     Patient seen and examined  Reviewed H&P, labs, and vital signs  Consulted neuro  Continue with current medical regimen  Awaiting PT/OT recs

## 2025-02-28 NOTE — PLAN OF CARE
Patient is awake alert and answers simple questions appropriately,  No complaints of pain, SOB, CP, weakness nor dizziness. 500 ml IV bolus as ordered. Call light in reach, bed alarm on., free of falls; instructed to call for assistance. Continue with plan of care as ordered. No distress noted.   Problem: Adult Inpatient Plan of Care  Goal: Plan of Care Review  Outcome: Progressing  Goal: Patient-Specific Goal (Individualized)  Outcome: Progressing  Goal: Optimal Comfort and Wellbeing  Outcome: Progressing     Problem: Skin Injury Risk Increased  Goal: Skin Health and Integrity  Outcome: Progressing     Problem: Fall Injury Risk  Goal: Absence of Fall and Fall-Related Injury  2/28/2025 0522 by Shanon Hernandez, RN  Outcome: Progressing  2/28/2025 0522 by Shanon Hernandez, RN  Outcome: Progressing

## 2025-02-28 NOTE — PT/OT/SLP EVAL
Occupational Therapy   Evaluation    Name: Tremayne Fortune  MRN: [id number]  Admitting Diagnosis: Encephalopathy, metabolic  Recent Surgery: * No surgery found *      Recommendations:     Discharge Recommendations: Low Intensity Therapy (w 24 hr supervision)  Discharge Equipment Recommendations:  none  Barriers to discharge:   (frequent fall hx)    Assessment:     Tremayne Fortune is a 89 y.o. male with a medical diagnosis of Encephalopathy, metabolic.  He presents with The primary encounter diagnosis was Confusion. Diagnoses of Weakness and Chest pain were also pertinent to this visit. . Performance deficits affecting function: weakness, impaired endurance, impaired self care skills, impaired functional mobility, impaired balance, impaired cognition, decreased safety awareness.      OT initial eval completed. Pt presenting with increased weakness, need of assist from baseline and would benefit from skilled OT to maximize function prior to d/c. Recommend LIT once medically stable and 24 hr supervision and shower chair.     Rehab Prognosis: Good; patient would benefit from acute skilled OT services to address these deficits and reach maximum level of function.       Plan:     Patient to be seen 2 x/week to address the above listed problems via self-care/home management, therapeutic activities, therapeutic exercises  Plan of Care Expires: 03/14/25  Plan of Care Reviewed with: patient    Subjective     Chief Complaint: no pain reported  Patient/Family Comments/goals: pt agreeable for eval    Occupational Profile:  Living Environment: Lives with wife, SSH, 2STE  Previous level of function: Per pt, Mod I with SPC. Pt reports independent with ADLs; required assist during eval. Doesn't drive  Equipment Used at Home: cane, straight, rollator, cane, quad, walker, rolling  Assistance upon Discharge: family    Pain/Comfort:  Pain Rating 1: 0/10  Pain Addressed 1: Reposition  Pain Rating Post-Intervention 1:  0/10    Objective:     Communicated with: RN prior to session.  Patient found HOB elevated with bed alarm, telemetry, peripheral IV, Condom Catheter upon OT entry to room.    General Precautions: Standard, fall  Orthopedic Precautions: N/A  Braces: N/A  Respiratory Status: Room air    Occupational Performance:    Bed Mobility:    Patient completed Rolling/Turning to Right with minimum assistance  Patient completed Scooting/Bridging with minimum assistance  Patient completed Supine to Sit with moderate assistance and 2 persons  Patient completed Sit to Supine with moderate assistance and 2 persons    Functional Mobility/Transfers:  Patient completed Sit <> Stand Transfer with moderate assistance and of 2 persons  with  rolling walker   Functional Mobility: Pt performed in room/out of room mobility and side steps at eob with RW and Min A with 2nd person moving items for safe navigation and assisting PRN for RW mgmt during turns. Vc/tcs on hand placement, safe t/f techniques, and step sequencing with RW. Pt presenting with mild R lateral lean; OT guided pt in balance activity in stand/sitting eob for self-correction for midline position. Demos/mirroring cues provided with SBA for pt     Activities of Daily Living:  Upper Body Dressing: minimum assistance to don/doff gown as robe with assist for line mgmt and threading UEs  Lower Body Dressing: moderate assistance to fix socks; pt attempting figure 4 but with impaired hip ext rot. SBA for dynamic balance    Cognitive/Visual Perceptual:  Cognitive/Psychosocial Skills:     -       Oriented to: Person, Place, and Time   -       Follows Commands/attention:Follows two-step commands  -       Memory: Impaired STM  -       Safety awareness/insight to disability: impaired   -       Mood/Affect/Coping skills/emotional control: Cooperative    Physical Exam:  Balance:    -       Fair+ sit, Fair- stand  Dominant hand:    -       R  Upper Extremity Range of Motion:     -       Right  Upper Extremity: WFL  -       Left Upper Extremity: WFL  Upper Extremity Strength:    -       Right Upper Extremity: 4-/5  -       Left Upper Extremity: 4-/5   Strength:    -       Right Upper Extremity: WFL  -       Left Upper Extremity: WFL  Gross motor coordination:   WFL    AMPAC 6 Click ADL:  AMPAC Total Score: 17    Treatment & Education:  Patient educated on role of OT/ POC development. Co-eval with PT to maximize function and safety. Occupational profile developed via interview. Patient guided to transition eob for assessment. Initiated ADL / functional mobility training as above with instruction on safe navigation of environment, postural adjustments, safe t/f techniques. Educated patient on importance of out of bed mobility, movement/repositioning throughout the day, and call button use. Answered questions within scope, and all needs met.     Patient left HOB elevated with all lines intact, call button in reach, bed alarm on, and staff for EEG present    GOALS:   Multidisciplinary Problems       Occupational Therapy Goals          Problem: Occupational Therapy    Goal Priority Disciplines Outcome Interventions   Occupational Therapy Goal     OT, PT/OT Progressing    Description: Goals to be met by: 3/14/2025     Patient will increase functional independence with ADLs by performing:    UE Dressing with Set-up Assistance.  LE Dressing with Supervision.  Grooming while seated with Set-up Assistance.  Toileting from toilet with Supervision for hygiene and clothing management.   Step transfer with Supervision  Toilet transfer to toilet with Supervision.                         DME Justifications:  No DME recommended requiring DME justifications    History:     Past Medical History:   Diagnosis Date    Colon cancer     Hypertension     Macular degeneration     Total retinal detachment of left eye 02/23/2021         Past Surgical History:   Procedure Laterality Date    CARDIAC SURGERY      4 vessel CABG     CATARACT EXTRACTION W/  INTRAOCULAR LENS IMPLANT Right 07/28/2016    Dr. Delgado    CATARACT EXTRACTION W/  INTRAOCULAR LENS IMPLANT Left 08/11/2016    Dr. Delgado     CHOLECYSTECTOMY      COLON SURGERY      colon resection    REPAIR OF RETINAL DETACHMENT WITH VITRECTOMY Left 02/23/2021    Procedure: REPAIR, RETINAL DETACHMENT, WITH VITRECTOMY/SCERAL BUCKLE/ LEFT EYE;  Surgeon: Alexander Shultz MD;  Location: [facility] OR [address] [place] [address];  Service: Ophthalmology;  Laterality: Left;    SCLERAL BUCKLING Left 02/23/2021    Procedure: SCLERAL BUCKLING;  Surgeon: Alexander Shultz MD;  Location: [facility] OR [address] [place] [address];  Service: Ophthalmology;  Laterality: Left;    VITRECTOMY BY PARS PLANA APPROACH Left 12/15/2020    Procedure: VITRECTOMY, PARS PLANA APPROACH, injection of subretinal tpa, injection of gas, left eye;  Surgeon: Alexander Shultz MD;  Location: [facility] OR [address] [place] [address];  Service: Ophthalmology;  Laterality: Left;  pt can't arrive until 9 or 10am       Time Tracking:     OT Date of Treatment: 02/28/25  OT Start Time: 1123  OT Stop Time: 1143  OT Total Time (min): 20 min    Billable Minutes:Evaluation 10  Therapeutic Activity 10    2/28/2025

## 2025-02-28 NOTE — PLAN OF CARE
Problem: Physical Therapy  Goal: Physical Therapy Goal  Description: Goals to be met by: 3/7/25     Patient will increase functional independence with mobility by performin. Pt to be (S) with bed mobility.  2. Pt to transfer with min A.  3. Pt to ambulate 150' w/RW min/CGA.  4. Pt to tolerate x reps BLE exs seated.    Outcome: Progressing   Initial eval completed, see in chart for details.

## 2025-02-28 NOTE — PT/OT/SLP PROGRESS
Occupational Therapy      Patient Name:  Tremayne Fortune   MRN:  [id number]    Patient not seen today secondary to Nursing/PCT care (bath). Will follow-up later as able.    2/28/2025

## 2025-02-28 NOTE — CONSULTS
[facility] [place] - Telemetry  Neurology Consult Note    Patient Name: Tremayne Fortune  Age: 89 y.o.  MRN: [id number]  Admission Date: 2/27/2025  Reason for consult: AMS, fall    CC:  fall    HPI:   Tremayne Fortune is a 89 y.o. year old male with past medical history of CAD s/p CABG, hypertension, hyperlipidemia, peripheral artery disease, anxiety was brought into the ED yesterday for a fall.  History obtained from patient, daughter on the phone and chart review.    Daughter states that patient was doing well yesterday afternoon when they all left to work.  Unclear when he fell but family found him at around 5:00 p.m. on the floor.  Daughter states that patient was cognitively intact without any concern for confusion when they found him.  Patient himself remembers the fall, he states that while he was walking, his legs gave out and he suffered from the fall.  Denies hitting his head or LOC. states that his legs were excessively weak and therefore could not get up after the fall.  He was therefore brought into the ED for further evaluation.    In the ER, vitals were unremarkable.  Patient was noted to be neurologically intact.  CT head and CT C-spine were obtained with no abnormality noted.  Overnight when his granddaughter came to visit him at around 10:00 p.m., he was thought to be confused and not at his cognitive baseline.  Therefore Neurology was consulted for further evaluation.    Daughter states that patient has been having excessive trouble with memory.  Does not have a formal diagnosis of dementia but he does tend to repeat himself a lot to family members.  They deny any behavioral changes at this time.  He does tend to drag his feet during ambulation at home, uses a cane at baseline.    Histories:  Allergies:  Shellfish containing products    Current Medications:  Current Medications[1]    Medical:   Past Medical History:   Diagnosis Date    Colon cancer     Hypertension     Macular degeneration     Total  retinal detachment of left eye 02/23/2021        Surgeries:   Past Surgical History:   Procedure Laterality Date    CARDIAC SURGERY      4 vessel CABG    CATARACT EXTRACTION W/  INTRAOCULAR LENS IMPLANT Right 07/28/2016    Dr. Delgado    CATARACT EXTRACTION W/  INTRAOCULAR LENS IMPLANT Left 08/11/2016    Dr. Delgado     CHOLECYSTECTOMY      COLON SURGERY      colon resection    REPAIR OF RETINAL DETACHMENT WITH VITRECTOMY Left 02/23/2021    Procedure: REPAIR, RETINAL DETACHMENT, WITH VITRECTOMY/SCERAL BUCKLE/ LEFT EYE;  Surgeon: Alexander Shultz MD;  Location: [facility] OR [address] [place] [address];  Service: Ophthalmology;  Laterality: Left;    SCLERAL BUCKLING Left 02/23/2021    Procedure: SCLERAL BUCKLING;  Surgeon: Alexander Shultz MD;  Location: [facility] OR [address] [place] [address];  Service: Ophthalmology;  Laterality: Left;    VITRECTOMY BY PARS PLANA APPROACH Left 12/15/2020    Procedure: VITRECTOMY, PARS PLANA APPROACH, injection of subretinal tpa, injection of gas, left eye;  Surgeon: Alexander Shultz MD;  Location: [facility] OR [address] [place] [address];  Service: Ophthalmology;  Laterality: Left;  pt can't arrive until 9 or 10am        Family:   Family History   Problem Relation Name Age of Onset    No Known Problems Mother      No Known Problems Father      No Known Problems Sister      No Known Problems Brother      No Known Problems Maternal Aunt      No Known Problems Maternal Uncle      No Known Problems Paternal Aunt      No Known Problems Paternal Uncle      No Known Problems Maternal Grandmother      No Known Problems Maternal Grandfather      No Known Problems Paternal Grandmother      No Known Problems Paternal Grandfather      Amblyopia Neg Hx      Blindness Neg Hx      Cancer Neg Hx      Cataracts Neg Hx      Diabetes Neg Hx      Glaucoma Neg Hx      Hypertension Neg Hx      Macular degeneration Neg Hx      Retinal detachment Neg Hx      Strabismus Neg Hx      Stroke Neg Hx      Thyroid disease Neg Hx         Tobacco Use    Smoking status:  "Former     Current packs/day: 0.00     Types: Cigarettes     Quit date: 1996     Years since quittin.6     Passive exposure: Past    Smokeless tobacco: Never   Substance and Sexual Activity    Alcohol use: No    Drug use: No    Sexual activity: Not Currently     Partners: Female         Physical Exam:     Physical Examination  BP (!) 107/56   Pulse 67   Temp 98.1 °F (36.7 °C)   Resp 18   Ht 5' 6" (1.676 m)   Wt 74.6 kg (164 lb 7.4 oz)   SpO2 (!) 92%   BMI 26.55 kg/m²   Body mass index is 26.55 kg/m².    Neurological Exam  Mental Status:   Alert and oriented to name, date, location, president.   Naming/Fluency/Comprehension/Repetition intact.   Recent/remote memory, registration, attention span/concentration, fund of knowledge intact by history.    CN:   II, III, IV, VI: PERRL, EOMI, no nystagmus, fundus not visualized due to inadequate dilation.V: intact to fine touch, temperature, pain.VII: symmetrical facial movement, nice smile, no drooping.VIII: grossly intact to hearing.    Motor:   5/5 in bilateral upper extremities and right lower extremity  3/5 in left lower extremity                Reflexes:   No Clonus, down going toes b/l.    Sensation: on both UEs and LEs    Intact to all modalities tested    Coordination:    Tremor: Absent    Gait:    Not tested    Significant Labs:   Recent Lab Results         25  0519   25        Albumin 3.3       3.6              117       ALT 36       38       Anion Gap 12       10       Appearance, UA   Clear           AST 54       46       Bacteria, UA   None           Baso # 0.01       0.01       Basophil % 0.1       0.1       Bilirubin (UA)   Negative           BILIRUBIN TOTAL 1.4  Comment: For infants and newborns, interpretation of results should be based  on gestational age, weight and in agreement with clinical  observations.    Premature Infant recommended reference ranges:  Up to 24 " hours.............<8.0 mg/dL  Up to 48 hours............<12.0 mg/dL  3-5 days..................<15.0 mg/dL  6-29 days.................<15.0 mg/dL         1.1  Comment: For infants and newborns, interpretation of results should be based  on gestational age, weight and in agreement with clinical  observations.    Premature Infant recommended reference ranges:  Up to 24 hours.............<8.0 mg/dL  Up to 48 hours............<12.0 mg/dL  3-5 days..................<15.0 mg/dL  6-29 days.................<15.0 mg/dL         BUN 13       14       Calcium 8.5       8.7       Chloride 106       107       CO2 20       24       Color, UA   Yellow           CPK       801       Creatinine 0.7       0.8       Differential Method Automated       Automated       eGFR >60       >60       Eos # 0.1       0.1       Eos % 1.7       1.2       Glucose 100       100       Glucose, UA   Negative           Gran # (ANC) 6.2       6.6       Gran % 76.8       81.0       Hematocrit 39.5       39.5       Hemoglobin 12.9       13.5       Hyaline Casts, UA   0           Immature Grans (Abs) 0.04  Comment: Mild elevation in immature granulocytes is non specific and   can be seen in a variety of conditions including stress response,   acute inflammation, trauma and pregnancy. Correlation with other   laboratory and clinical findings is essential.         0.02  Comment: Mild elevation in immature granulocytes is non specific and   can be seen in a variety of conditions including stress response,   acute inflammation, trauma and pregnancy. Correlation with other   laboratory and clinical findings is essential.         Immature Granulocytes 0.5       0.2       Ketones, UA   Negative           Leukocyte Esterase, UA   Negative           Lymph # 1.1       0.9       Lymph % 13.3       10.7       Magnesium  2.1       2.1       MCH 30.8       31.3       MCHC 32.7       34.2       MCV 94       91       Microscopic Comment   SEE COMMENT  Comment: Other formed  elements not mentioned in the report are not   present in the microscopic examination.              Mono # 0.6       0.6       Mono % 7.6       6.8       MPV 9.1       9.0       NITRITE UA   Negative           nRBC 0       0       Blood, UA   Negative           pH, UA   7.0           Phosphorus Level 3.2       3.2       Platelet Count 162       161       POCT Glucose     104         Potassium 3.6       3.8       PROTEIN TOTAL 6.7       7.3       Protein, UA   1+  Comment: Recommend a 24 hour urine protein or a urine   protein/creatinine ratio if globulin induced proteinuria is  clinically suspected.             RBC 4.19       4.32       RBC, UA   3           RDW 14.7       14.6       Sodium 138       141       Spec Grav UA   1.020           Specimen UA   Urine, Clean Catch           UROBILINOGEN UA   Negative           WBC, UA   1           WBC 8.07       8.20               Significant Imaging:   Images were personally reviewed and interpreted:   MRI brain without contrast:  No acute abnormality noted.  Generalized volume loss with significant microvascular changes noted.    EEG mild slowing noted.    Assessment and Plan:   89 y.o. year old male with past medical history of CAD s/p CABG, hypertension, hyperlipidemia, peripheral artery disease, anxiety was brought into the ED yesterday for a fall.  Patient remembers the event, said his legs gave out.  Denies losing consciousness, denies presyncopal symptoms prior to the fall.  On exam left leg is weaker, unclear baseline, unclear if exaggerated after the fall.  MRI brain negative for acute infarct.    Patient noted to be confused overnight by family members and therefore Neurology consult placed.  EEG unremarkable as well.  Likely sundowning in the setting of undiagnosed at least mild dementia from being in unfamiliar environment.  Patient is back to baseline this morning.    Plan:   - recommend obtaining CT L-spine to evaluate for the left leg weakness.  We will  review this peripherally  - blood work with vitamin B1/B6/B 9/B12/vitamin-E/MIHAI/protein electrophoresis sent  - continue management of any medical comorbidities per Hospital Medicine  - follow up in Neurology Clinic for evaluation of memory and gait imbalance.      Thank you for your consult. I will sign off. Please contact us if you have any additional questions.    Time spent on this encounter: 30 minutes. This includes face to face time and non-face to face time preparing to see the patient (eg, review of tests), obtaining and/or reviewing separately obtained history, documenting clinical information in the electronic or other health record, independently interpreting results and communicating results to the patient/family/caregiver, or care coordinator.           Klever Juarez MD  Neurology  [facility]         [1]   Current Facility-Administered Medications   Medication Dose Route Frequency Provider Last Rate Last Admin    acetaminophen tablet 650 mg  650 mg Oral Q8H PRN Kevin Stout MD        acetaminophen tablet 650 mg  650 mg Oral Q4H PRN Kevin Stout MD        aluminum-magnesium hydroxide-simethicone 200-200-20 mg/5 mL suspension 30 mL  30 mL Oral QID PRN Kevin Stout MD        amLODIPine tablet 5 mg  5 mg Oral Nightly Kevin Stout MD   5 mg at 02/28/25 0153    aspirin EC tablet 325 mg  325 mg Oral Daily Kevin Stout MD   325 mg at 02/28/25 0803    atorvastatin tablet 20 mg  20 mg Oral Daily Kevin Stout MD   20 mg at 02/28/25 0803    bisacodyL suppository 10 mg  10 mg Rectal Daily PRN Kevin Stout MD        enoxaparin injection 40 mg  40 mg Subcutaneous Daily Kevin Stout MD        ezetimibe tablet 10 mg  10 mg Oral Daily Kevin Stout MD   10 mg at 02/28/25 0803    glucagon (human recombinant) injection 1 mg  1 mg Intramuscular PRN Kevin Stout MD        glucose chewable tablet 16 g  16 g Oral PRN Kevin Stout MD        glucose chewable tablet 24 g  24 g Oral PRN Kevin Stout  MD        isosorbide mononitrate 24 hr tablet 30 mg  30 mg Oral Daily Kevin Stout MD   30 mg at 02/28/25 0803    losartan tablet 50 mg  50 mg Oral Daily Kevin Stout MD   50 mg at 02/28/25 0803    magnesium oxide tablet 800 mg  800 mg Oral PRN Kevin Stout MD        magnesium oxide tablet 800 mg  800 mg Oral PRN Kevin Stout MD        melatonin tablet 6 mg  6 mg Oral Nightly PRN Kevin Stout MD        metoprolol succinate (TOPROL-XL) 24 hr tablet 50 mg  50 mg Oral BID Kevin Stout MD   50 mg at 02/28/25 0803    naloxone 0.4 mg/mL injection 0.02 mg  0.02 mg Intravenous PRN Kevin Stout MD        ondansetron injection 4 mg  4 mg Intravenous Q8H PRN Kevin Stout MD        potassium bicarbonate disintegrating tablet 35 mEq  35 mEq Oral PRN Kevin Stout MD        potassium bicarbonate disintegrating tablet 50 mEq  50 mEq Oral PRN Kevin Stout MD        potassium bicarbonate disintegrating tablet 60 mEq  60 mEq Oral PRN Kevin Stout MD        potassium, sodium phosphates 280-160-250 mg packet 2 packet  2 packet Oral PRN Kevin Stout MD        potassium, sodium phosphates 280-160-250 mg packet 2 packet  2 packet Oral PRN Kevin Stout MD        potassium, sodium phosphates 280-160-250 mg packet 2 packet  2 packet Oral PRN Kevin Stout MD        prochlorperazine injection Soln 5 mg  5 mg Intravenous Q6H PRKevin Montiel MD        senna-docusate 8.6-50 mg per tablet 1 tablet  1 tablet Oral Daily PRN Kevin Stout MD        simethicone chewable tablet 80 mg  1 tablet Oral QID PRN Kevin Stout MD        sodium chloride 0.9% flush 10 mL  10 mL Intravenous Q12H PRN Kevin Stout MD         Facility-Administered Medications Ordered in Other Encounters   Medication Dose Route Frequency Provider Last Rate Last Admin    atropine 1% ophthalmic solution 1 drop  1 drop Left Eye On Call Procedure Maulik Mays MD   1 drop at 12/15/20 1040    moxifloxacin 0.5 % ophthalmic solution 1 drop  1 drop Left Eye On Call  Procedure Maulik Mays MD   1 drop at 12/15/20 1039    phenylephrine HCL 2.5% ophthalmic solution 1 drop  1 drop Left Eye On Call Procedure Maulik Mays MD   1 drop at 12/15/20 1040    prednisoLONE acetate 1 % ophthalmic suspension 1 drop  1 drop Left Eye On Call Procedure Maulik Mays MD   1 drop at 12/15/20 1040    tetracaine HCl (PF) 0.5 % Drop 1 drop  1 drop Left Eye On Call Procedure Maulik Mays MD   1 drop at 12/15/20 1020    tobramycin-dexamethasone 0.3-0.1% ophthalmic ointment   Left Eye On Call Procedure Maulik Mays MD        tropicamide 1% ophthalmic solution 1 drop  1 drop Left Eye On Call Procedure Maulik Mays MD   1 drop at 12/15/20 1039

## 2025-02-28 NOTE — ASSESSMENT & PLAN NOTE
- Patient presents with confusion, falls.   - Less likely related to sepsis given negative work up.   - CT head without contrast showed no acute process.   - Labs are unremarkable.   - Could be related to underlying dementia     Plan:  - MRI brain ordered   - Consider neurology evaluation

## 2025-02-28 NOTE — NURSING
Report received from kaushik Claudio RN. Patient resting quietly, no apparent distress noted at this time. Wheels locked in lowest position, call light within reach, Telemetry monitoring in place.    [facility]  Nurses Note -- 4 Eyes      2/28/2025       Skin assessed on: Q Shift      [x] No Pressure Injuries Present    [x]Prevention Measures Documented  Altered skin integrity noted on sacral area  [] Yes LDA  for Pressure Injury Previously documented     [] Yes New Pressure Injury Discovered   [] LDA for New Pressure Injury Added      Attending RN:  Becky Guerrier LPN     Second RN:  STARLA Claudio

## 2025-02-28 NOTE — CONSULTS
[facility] [place] - Telemetry  Wound Care  WOC [facility]    Patient Name:  Tremayne Fortune   MRN:  [id number]  Date: 2/28/2025  Diagnosis: Encephalopathy, metabolic    History:     Past Medical History:   Diagnosis Date    Colon cancer     Hypertension     Macular degeneration     Total retinal detachment of left eye 02/23/2021       Social History[1]    Precautions:     Allergies as of 02/27/2025 - Reviewed 02/27/2025   Allergen Reaction Noted    Shellfish containing products Hives and Nausea And Vomiting 07/22/2016       [facility] Assessment Details/Treatment     Active Problem List with Overview Notes    Diagnosis Date Noted    Encephalopathy, metabolic 02/28/2025    Fall 02/28/2025    Exudative age-related macular degeneration, left eye, with active choroidal neovascularization 01/12/2022    Senile purpura 01/12/2022    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    Peripheral vascular disease 01/12/2022    Total retinal detachment of left eye 02/23/2021    Coronary artery disease involving coronary bypass graft of native heart without angina pectoris 01/11/2021    Dyslipidemia 01/11/2021    Overweight (BMI 25.0-29.9) 01/11/2021    History of colon cancer 01/11/2021    Macular subretinal hemorrhage of left eye 12/15/2020    PCO (posterior capsular opacification), right 05/10/2018    Pseudophakia 05/01/2017    Nuclear sclerosis of left eye 08/04/2016    Post-operative state 07/29/2016    Senile nuclear sclerosis 07/28/2016    PSC (posterior subcapsular cataract), right 07/07/2016    Nuclear sclerosis of both eyes 07/07/2016    Early dry stage nonexudative age-related macular degeneration of both eyes 07/07/2016    Essential hypertension 07/07/2016    Asteroid hyalosis, 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 Was on ground for about 8 hours unable to get up.  Having frequent falls over the last 2 days.    WBC 8.07 Hgb 12.9 Hct 39.5 Alb 3.3 Weight 74.6 kg   On Isoflex mattress; Iván score 15; nursing using male external urinary catheter- no documentation of incontinence   4:00 am 4 Eyes Skin Assessment- No Pressure Injuries present- reported MASD inner buttocks  Photodocumentation (from Media )    Buttocks  Assessment:  Erythema inner buttocks/gluteal cleft. Drowsy. Independent bed mobility.   Wearing condom cath and adult brief.   Treatment/Plan:  Remedy products to treat gluteal cleft- Cleanse with Remedy No Rinse Foaming cleanser and apply Remedy Moisture Barrier  Nursing to apply Remedy products to areas of redness in gluteal cleft every shift and prn  Recommendations made to primary team. Orders placed.     2025         [1]   Social History  Socioeconomic History    Marital status:     Number of children: 2   Occupational History    Occupation: retired   Tobacco Use    Smoking status: Former     Current packs/day: 0.00     Types: Cigarettes     Quit date: 1996     Years since quittin.6     Passive exposure: Past    Smokeless tobacco: Never   Substance and Sexual Activity    Alcohol use: No    Drug use: No    Sexual activity: Not Currently     Partners: Female

## 2025-02-28 NOTE — PT/OT/SLP EVAL
Physical Therapy Evaluation     Patient Name: Tremayne Fortune   MRN: [id number]  Recent Surgery: * No surgery found *      Recommendations:     Discharge Recommendations: Low Intensity Therapy   Discharge Equipment Recommendations: none       Assessment:     Tremayne Fortune is a 89 y.o. male admitted with a medical diagnosis of Encephalopathy, metabolic. He presents with the following impairments/functional limitations: impaired functional mobility, gait instability, impaired balance.     Rehab Prognosis: Good; patient would benefit from acute PT services to address these deficits and reach maximum level of function.    Plan:     During this hospitalization, patient to be seen 3 x/week to address the above listed problems via gait training, therapeutic activities, therapeutic exercises    Plan of Care Expires: 03/07/25    Subjective     Chief Complaint: None  Patient Comments/Goals: Pt agreeable to therapy evaluations and treatment.  Pain/Comfort:  0/10    Social History:  Living Environment: Patient lives with their spouse in a single story home with number of outside stair(s): 2  Prior Level of Function: Prior to admission, patient with undetermined level of function  Equipment Used at Home: cane, straight  DME owned (not currently used):  rollator, rolling walker, and quad cane  Assistance Upon Discharge: family    Objective:     Communicated with nurseBecky prior to session. Patient found HOB elevated with bed alarm, Condom Catheter, telemetry upon PT entry to room.    General Precautions: Standard, fall   Orthopedic Precautions: N/A   Braces: N/A    Respiratory Status: Room air    Exams:  Cognition: Patient is oriented to Person, Place, and date and year  RLE ROM: WFL  RLE Strength: WFL  LLE ROM: WFL  LLE Strength: WFL  Sensation:    -       Intact  light/touch BLEs    Functional Mobility:  Gait belt applied - Yes  Bed Mobility  Supine to Sit: moderate assistance for LE management and trunk  management  Sit to Supine: moderate assistance for LE management and trunk management  Transfers  Sit to Stand: moderate assistance and of 2 persons with rolling walker and with cues for hand placement  Gait  Patient ambulated 30' with rolling walker and minimum assistance. Patient demonstrates unsteady gait.   Balance  Sitting: stand by assistance  Standing: moderate assistance      Therapeutic Activities and Exercises:   Patient educated on role of acute care PT and PT POC and call light usage  OOB, ambulated to nursing desk then back to room and assisted back to bed as EEG to follow.    AM-PAC 6 CLICK MOBILITY  Total Score:13    Patient left HOB elevated with all lines intact and call button in reach.    GOALS:   Multidisciplinary Problems       Physical Therapy Goals          Problem: Physical Therapy    Goal Priority Disciplines Outcome Interventions   Physical Therapy Goal     PT, PT/OT Progressing    Description: Goals to be met by: 3/7/25     Patient will increase functional independence with mobility by performin. Pt to be (S) with bed mobility.  2. Pt to transfer with min A.  3. Pt to ambulate 150' w/RW min/CGA.  4. Pt to tolerate x reps BLE exs seated.                         DME Justifications:  No DME recommended requiring DME justifications    History:     Past Medical History:   Diagnosis Date    Colon cancer     Hypertension     Macular degeneration     Total retinal detachment of left eye 2021       Past Surgical History:   Procedure Laterality Date    CARDIAC SURGERY      4 vessel CABG    CATARACT EXTRACTION W/  INTRAOCULAR LENS IMPLANT Right 2016    Dr. Delgado    CATARACT EXTRACTION W/  INTRAOCULAR LENS IMPLANT Left 2016    Dr. Delgado     CHOLECYSTECTOMY      COLON SURGERY      colon resection    REPAIR OF RETINAL DETACHMENT WITH VITRECTOMY Left 2021    Procedure: REPAIR, RETINAL DETACHMENT, WITH VITRECTOMY/SCERAL BUCKLE/ LEFT EYE;  Surgeon: Alexander CHAUDHRY  MD Lexii;  Location: [facility] OR [address] [place] [address];  Service: Ophthalmology;  Laterality: Left;    SCLERAL BUCKLING Left 02/23/2021    Procedure: SCLERAL BUCKLING;  Surgeon: Alexander Shultz MD;  Location: [facility] OR [address] [place] [address];  Service: Ophthalmology;  Laterality: Left;    VITRECTOMY BY PARS PLANA APPROACH Left 12/15/2020    Procedure: VITRECTOMY, PARS PLANA APPROACH, injection of subretinal tpa, injection of gas, left eye;  Surgeon: Alexander Shultz MD;  Location: [address];  Service: Ophthalmology;  Laterality: Left;  pt can't arrive until 9 or 10am       Time Tracking:     PT Received On: 02/28/25  PT Start Time: 1119  PT Stop Time: 1142  PT Total Time (min): 23 min     Billable Minutes: Evaluation 15 and Gait Training 8 (Co-eval with DUNCAN Paulino)    2/28/2025

## 2025-02-28 NOTE — HPI
This is a 89-year-old male with a past medical history of CAD s/p CABG, hypertension, hyperlipidemia, peripheral artery disease, anxiety, who presents after a fall.     Patient presents for evaluation after a fall.  Reportedly, he had a ground level fall for about 8 hours and was unable to get up.  He has been having frequent falls over the last 2 days.  Patient has some short-term memory loss at baseline, but appeared more confused on the day of presentation.  Patient denies any acute symptoms, reports that he came to the hospital because of falls but is confused.    In the ED, the patient was hemodynamically stable.  Labs were remarkable for normocytic anemia (13.5), mildly elevated T bili (1.1), elevated AST (46).  UA was unremarkable.  CT head/cervical spine showed no acute process, with moderate of degenerative changes at C5/C6.  Patient was admitted for further evaluation.

## 2025-02-28 NOTE — PLAN OF CARE
Problem: Occupational Therapy  Goal: Occupational Therapy Goal  Description: Goals to be met by: 3/14/2025     Patient will increase functional independence with ADLs by performing:    UE Dressing with Set-up Assistance.  LE Dressing with Supervision.  Grooming while seated with Set-up Assistance.  Toileting from toilet with Supervision for hygiene and clothing management.   Step transfer with Supervision  Toilet transfer to toilet with Supervision.    Outcome: Progressing     OT initial eval completed. Pt presenting with increased weakness, need of assist from baseline and would benefit from skilled OT to maximize function prior to d/c. Recommend LIT once medically stable and 24 hr supervision and shower chair.

## 2025-02-28 NOTE — ED PROVIDER NOTES
Encounter Date: 2/27/2025       History     Chief Complaint   Patient presents with    Fall     Pt arrived via ems, pt chief complaint is a Fall. Pt had a ground level fall today and was on ground for about 8 hrs. Pt has no physical complaints just feeling weak.       HPI    89-year-old male with a past medical history of hypertension, hyperlipidemia who presents by EMS with after a fall.  Per EMS, the patient reportedly had a ground level fall and was on the floor for about 8 hours due to weakness and inability to get up from the floor. Patient reports that he has had 2 falls in the last 2 days. He states he has a cane at home, however, he does not use it often. He denies head trauma or LOC. Per the patient's granddaughter (Eliana [phone]), the patient lives at home with his wife and at baseline, has has some short term memory loss. However she states today, he appeared confused. She states he was saying that he went to an urgent care, which was not true because he does not drive. She states that he told her that he had fallen at 9 pm last night and was on the floor until 5 pm this evening. He also reported to family that he had fallen a total of 3 times secondary to weakness. She states the patient was very weak, and could not even bare weight on his lower extremity when they attempted to get him up from the floor. She saw him the day prior and he was able to ambulate without difficulty. No blood thinners. No attempt at treatment. Denies other associated symptoms.         Review of patient's allergies indicates:   Allergen Reactions    Shellfish containing products Hives and Nausea And Vomiting            Past Medical History:   Diagnosis Date    Colon cancer     Hypertension     Macular degeneration     Total retinal detachment of left eye 02/23/2021     Past Surgical History:   Procedure Laterality Date    CARDIAC SURGERY      4 vessel CABG    CATARACT EXTRACTION W/  INTRAOCULAR LENS IMPLANT Right 07/28/2016     Dr. Delgado    CATARACT EXTRACTION W/  INTRAOCULAR LENS IMPLANT Left 08/11/2016    Dr. Delgado     CHOLECYSTECTOMY      COLON SURGERY      colon resection    REPAIR OF RETINAL DETACHMENT WITH VITRECTOMY Left 02/23/2021    Procedure: REPAIR, RETINAL DETACHMENT, WITH VITRECTOMY/SCERAL BUCKLE/ LEFT EYE;  Surgeon: Alexander Shultz MD;  Location: [facility] OR [address] [place] [address];  Service: Ophthalmology;  Laterality: Left;    SCLERAL BUCKLING Left 02/23/2021    Procedure: SCLERAL BUCKLING;  Surgeon: Alexander Shultz MD;  Location: [facility] OR [address] [place] [address];  Service: Ophthalmology;  Laterality: Left;    VITRECTOMY BY PARS PLANA APPROACH Left 12/15/2020    Procedure: VITRECTOMY, PARS PLANA APPROACH, injection of subretinal tpa, injection of gas, left eye;  Surgeon: Alexander Shultz MD;  Location: [facility] OR [address] [place] [address];  Service: Ophthalmology;  Laterality: Left;  pt can't arrive until 9 or 10am     Family History   Problem Relation Name Age of Onset    No Known Problems Mother      No Known Problems Father      No Known Problems Sister      No Known Problems Brother      No Known Problems Maternal Aunt      No Known Problems Maternal Uncle      No Known Problems Paternal Aunt      No Known Problems Paternal Uncle      No Known Problems Maternal Grandmother      No Known Problems Maternal Grandfather      No Known Problems Paternal Grandmother      No Known Problems Paternal Grandfather      Amblyopia Neg Hx      Blindness Neg Hx      Cancer Neg Hx      Cataracts Neg Hx      Diabetes Neg Hx      Glaucoma Neg Hx      Hypertension Neg Hx      Macular degeneration Neg Hx      Retinal detachment Neg Hx      Strabismus Neg Hx      Stroke Neg Hx      Thyroid disease Neg Hx       Social History[1]  Review of Systems   Constitutional:  Negative for chills and fever.   HENT:  Negative for congestion and drooling.    Eyes:  Negative for visual disturbance.   Respiratory:  Negative for shortness of breath.    Cardiovascular:  Negative for chest  pain.   Gastrointestinal:  Negative for abdominal pain, nausea and vomiting.   Genitourinary:  Negative for dysuria.   Musculoskeletal:  Negative for back pain, neck pain and neck stiffness.   Neurological:  Positive for weakness. Negative for dizziness, seizures, light-headedness, numbness and headaches.   Psychiatric/Behavioral:  Positive for confusion.        Physical Exam     Initial Vitals [02/27/25 1906]   BP Pulse Resp Temp SpO2   (!) 153/73 77 18 99 °F (37.2 °C) 96 %      MAP       --         Physical Exam    Nursing note and vitals reviewed.  Constitutional: He appears well-developed. He is not diaphoretic.   Frail, elderly appearing male    HENT:   Head: Normocephalic and atraumatic.   Eyes: Conjunctivae are normal. No scleral icterus.   Neck: Neck supple. No JVD present.   Normal range of motion.  Cardiovascular:  Normal rate, regular rhythm, normal heart sounds and intact distal pulses.           Pulmonary/Chest: Breath sounds normal. No stridor. No respiratory distress.   Abdominal: Abdomen is soft. He exhibits no distension. There is no abdominal tenderness. There is no rebound and no guarding.   Musculoskeletal:         General: No tenderness or edema. Normal range of motion.      Cervical back: Normal range of motion and neck supple.     Neurological: He is alert. He has normal strength. He displays no atrophy and no tremor. He exhibits normal muscle tone. He displays no seizure activity.   Moves all extremities, follows all commands, no focal neurologic deficits. Oriented to self and date of birth.    Skin: Skin is warm and dry. No erythema.   Dry scaly skin to bilateral lower extremities.    Psychiatric: He has a normal mood and affect.         ED Course   Procedures  Labs Reviewed   CBC W/ AUTO DIFFERENTIAL - Abnormal       Result Value    WBC 8.20      RBC 4.32 (*)     Hemoglobin 13.5 (*)     Hematocrit 39.5 (*)     MCV 91      MCH 31.3 (*)     MCHC 34.2      RDW 14.6 (*)     Platelets 161       MPV 9.0 (*)     Immature Granulocytes 0.2      Gran # (ANC) 6.6      Immature Grans (Abs) 0.02      Lymph # 0.9 (*)     Mono # 0.6      Eos # 0.1      Baso # 0.01      nRBC 0      Gran % 81.0 (*)     Lymph % 10.7 (*)     Mono % 6.8      Eosinophil % 1.2      Basophil % 0.1      Differential Method Automated     COMPREHENSIVE METABOLIC PANEL - Abnormal    Sodium 141      Potassium 3.8      Chloride 107      CO2 24      Glucose 100      BUN 14      Creatinine 0.8      Calcium 8.7      Total Protein 7.3      Albumin 3.6      Total Bilirubin 1.1 (*)     Alkaline Phosphatase 117      AST 46 (*)     ALT 38      eGFR >60      Anion Gap 10     URINALYSIS, REFLEX TO URINE CULTURE - Abnormal    Specimen UA Urine, Clean Catch      Color, UA Yellow      Appearance, UA Clear      pH, UA 7.0      Specific Gravity, UA 1.020      Protein, UA 1+ (*)     Glucose, UA Negative      Ketones, UA Negative      Bilirubin (UA) Negative      Occult Blood UA Negative      Nitrite, UA Negative      Urobilinogen, UA Negative      Leukocytes, UA Negative      Narrative:     Specimen Source->Urine   CK - Abnormal     (*)    URINALYSIS MICROSCOPIC    RBC, UA 3      WBC, UA 1      Bacteria None      Hyaline Casts, UA 0      Microscopic Comment SEE COMMENT      Narrative:     Specimen Source->Urine   MAGNESIUM   PHOSPHORUS   MAGNESIUM    Magnesium 2.1     PHOSPHORUS    Phosphorus 3.2     POCT GLUCOSE    POCT Glucose 104     POCT GLUCOSE MONITORING CONTINUOUS     EKG Readings: (Independently Interpreted)   EKG obtained at 1956 shows normal sinus rhythm with a heart rate of 74 beats per minute, T-wave inversions in leads 1, aVL as well as V4 through V 5.  No reciprocal changes.  No acute ST segment changes.  No old EKG in [facility] to compare.       Imaging Results              CT Head Without Contrast (Final result)  Result time 02/27/25 22:46:11      Final result by Asuncion Zelaya MD (02/27/25 22:46:11)                   Impression:      No  acute intracranial abnormality detected.    No acute cervical fracture.  Moderate degenerative changes at C5/C6.      Electronically signed by: Asuncion Zelaya  Date:    02/27/2025  Time:    22:46               Narrative:    EXAMINATION:  CT OF THE HEAD WITHOUT AND CT CERVICAL SPINE    CLINICAL HISTORY:  Head trauma, minor (Age >= 65y);; Neck trauma (Age >= 65y);    TECHNIQUE:  5 mm unenhanced axial images were obtained from the skull base to the vertex.  1.25 mm axial images were obtained through the cervical spine.    COMPARISON:  None.    FINDINGS:  CT head: Moderate cerebral atrophy and chronic small vessel ischemic changes are present.  There is no acute intracranial hemorrhage, territorial infarct or mass effect, or midline shift. In the visualized paranasal sinuses and mastoid air cells, there is mild left maxillary sinus mucoperiosteal thickening.  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  Advanced vascular calcifications are present.    CT cervical spine: There is <normal alignment of the cervical spine>.  There is no acute fracture or subluxation.  Moderate degenerative changes are seen at C5-C6 and endplate sclerosis, intervertebral disc space narrowing, and small marginal osteophytes.  Vascular calcifications are seen at the carotid bulbs.                                       CT Cervical Spine Without Contrast (Final result)  Result time 02/27/25 22:46:11      Final result by Asuncion Zelaya MD (02/27/25 22:46:11)                   Impression:      No acute intracranial abnormality detected.    No acute cervical fracture.  Moderate degenerative changes at C5/C6.      Electronically signed by: Asuncion Zelaya  Date:    02/27/2025  Time:    22:46               Narrative:    EXAMINATION:  CT OF THE HEAD WITHOUT AND CT CERVICAL SPINE    CLINICAL HISTORY:  Head trauma, minor (Age >= 65y);; Neck trauma (Age >= 65y);    TECHNIQUE:  5 mm unenhanced axial images were obtained from the skull base to the vertex.  1.25  mm axial images were obtained through the cervical spine.    COMPARISON:  None.    FINDINGS:  CT head: Moderate cerebral atrophy and chronic small vessel ischemic changes are present.  There is no acute intracranial hemorrhage, territorial infarct or mass effect, or midline shift. In the visualized paranasal sinuses and mastoid air cells, there is mild left maxillary sinus mucoperiosteal thickening.  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  Advanced vascular calcifications are present.    CT cervical spine: There is <normal alignment of the cervical spine>.  There is no acute fracture or subluxation.  Moderate degenerative changes are seen at C5-C6 and endplate sclerosis, intervertebral disc space narrowing, and small marginal osteophytes.  Vascular calcifications are seen at the carotid bulbs.                                       Medications   amLODIPine tablet 5 mg (has no administration in time range)   aspirin EC tablet 325 mg (has no administration in time range)   ezetimibe tablet 10 mg (has no administration in time range)   isosorbide mononitrate 24 hr tablet 30 mg (has no administration in time range)   losartan tablet 50 mg (has no administration in time range)   metoprolol succinate (TOPROL-XL) 24 hr tablet 50 mg (has no administration in time range)   atorvastatin tablet 20 mg (has no administration in time range)   sodium chloride 0.9% flush 10 mL (has no administration in time range)   melatonin tablet 6 mg (has no administration in time range)   ondansetron injection 4 mg (has no administration in time range)   prochlorperazine injection Soln 5 mg (has no administration in time range)   senna-docusate 8.6-50 mg per tablet 1 tablet (has no administration in time range)   bisacodyL suppository 10 mg (has no administration in time range)   acetaminophen tablet 650 mg (has no administration in time range)   simethicone chewable tablet 80 mg (has no administration in time range)   aluminum-magnesium  hydroxide-simethicone 200-200-20 mg/5 mL suspension 30 mL (has no administration in time range)   acetaminophen tablet 650 mg (has no administration in time range)   naloxone 0.4 mg/mL injection 0.02 mg (has no administration in time range)   potassium bicarbonate disintegrating tablet 50 mEq (has no administration in time range)   potassium bicarbonate disintegrating tablet 35 mEq (has no administration in time range)   potassium bicarbonate disintegrating tablet 60 mEq (has no administration in time range)   magnesium oxide tablet 800 mg (has no administration in time range)   magnesium oxide tablet 800 mg (has no administration in time range)   potassium, sodium phosphates 280-160-250 mg packet 2 packet (has no administration in time range)   potassium, sodium phosphates 280-160-250 mg packet 2 packet (has no administration in time range)   potassium, sodium phosphates 280-160-250 mg packet 2 packet (has no administration in time range)   glucose chewable tablet 16 g (has no administration in time range)   glucose chewable tablet 24 g (has no administration in time range)   glucagon (human recombinant) injection 1 mg (has no administration in time range)   enoxaparin injection 40 mg (has no administration in time range)     Medical Decision Making   MDM  This is an emergent evaluation of a 89-year-old male with a past medical history of hypertension, hyperlipidemia who presents by EMS with after a fall. Initial vitals in the ED  [02/27/25 1906]  BP: (!) 153/73  Pulse: 77  Resp: 18  Temp: 99 °F (37.2 °C)  SpO2: 96 % .     Physical exam noted above. DDx includes but is not limited to contusion, concussion, electrolyte abnormality, metabolic encephalopathy, dehydration, JOCELYN, UTI, dementia, rhabdomyolysis, epidural versus subdural hematoma, intracranial hemorrhage, occult trauma. Also considered but clinically less likely to be stroke, meningitis, sepsis, dissection. Will obtain labs and imaging including CBC, CMP,  CPK, UA, magnesium, phosphorus, EKG, CT head, CT cervical spine. Will also provide pain medication as needed. Will continue to monitor and frequently reassess pending results of labs, treatments and final disposition.    Patient is aware of plan and is amenable.     Lorna Castañeda D.O  EMERGENCY MEDICINE  8:30 PM 2025      UPDATE  Labs reveal a minimally elevated CPK.  Remainder of labs unremarkable.  CT head and cervical spine also showed no obvious acute intracranial abnormality as well as acute cervical fracture.  EKG shows no acute STEMI. Despite negative workup here in the ED today, family expressed concern that patient not back at his baseline mental status.  Case was discussed with [facility], Dr. Stout and the patient was placed on the observation service. Patient is aware and agreeable to the plan.     Lorna Castañeda D.O  EMERGENCY MEDICINE   1:26 AM 2025         This chart was completed using dictation software, as a result there may be some transcription errors     Amount and/or Complexity of Data Reviewed  Independent Historian:      Details: See HPI  External Data Reviewed: labs, radiology, ECG and notes.  Labs: ordered. Decision-making details documented in ED Course.  Radiology: ordered and independent interpretation performed. Decision-making details documented in ED Course.  ECG/medicine tests: ordered and independent interpretation performed. Decision-making details documented in ED Course.    Risk  Decision regarding hospitalization.                                    Clinical Impression:  Final diagnoses:  [R53.1] Weakness  [R41.0] Confusion (Primary)          ED Disposition Condition    Observation Stable                    [1]   Social History  Tobacco Use    Smoking status: Former     Current packs/day: 0.00     Types: Cigarettes     Quit date: 1996     Years since quittin.6     Passive exposure: Past    Smokeless tobacco: Never   Substance Use  Topics    Alcohol use: No    Drug use: No        Lorna Castañeda, DO  02/28/25 0129

## 2025-03-01 VITALS
BODY MASS INDEX: 26.29 KG/M2 | TEMPERATURE: 98 F | SYSTOLIC BLOOD PRESSURE: 138 MMHG | HEART RATE: 71 BPM | WEIGHT: 163.56 LBS | RESPIRATION RATE: 18 BRPM | HEIGHT: 66 IN | DIASTOLIC BLOOD PRESSURE: 63 MMHG | OXYGEN SATURATION: 94 %

## 2025-03-01 LAB
ANION GAP SERPL CALC-SCNC: 9 MMOL/L (ref 8–16)
BUN SERPL-MCNC: 14 MG/DL (ref 8–23)
CALCIUM SERPL-MCNC: 8.3 MG/DL (ref 8.7–10.5)
CHLORIDE SERPL-SCNC: 106 MMOL/L (ref 95–110)
CO2 SERPL-SCNC: 22 MMOL/L (ref 23–29)
CREAT SERPL-MCNC: 0.7 MG/DL (ref 0.5–1.4)
ERYTHROCYTE [DISTWIDTH] IN BLOOD BY AUTOMATED COUNT: 14.9 % (ref 11.5–14.5)
EST. GFR  (NO RACE VARIABLE): >60 ML/MIN/1.73 M^2
FOLATE SERPL-MCNC: 14.5 NG/ML (ref 4–24)
GLUCOSE SERPL-MCNC: 97 MG/DL (ref 70–110)
HCT VFR BLD AUTO: 38.6 % (ref 40–54)
HGB BLD-MCNC: 12.9 G/DL (ref 14–18)
MCH RBC QN AUTO: 30.8 PG (ref 27–31)
MCHC RBC AUTO-ENTMCNC: 33.4 G/DL (ref 32–36)
MCV RBC AUTO: 92 FL (ref 82–98)
PLATELET # BLD AUTO: 159 K/UL (ref 150–450)
PMV BLD AUTO: 9.3 FL (ref 9.2–12.9)
POTASSIUM SERPL-SCNC: 3.7 MMOL/L (ref 3.5–5.1)
RBC # BLD AUTO: 4.19 M/UL (ref 4.6–6.2)
SODIUM SERPL-SCNC: 137 MMOL/L (ref 136–145)
VIT B12 SERPL-MCNC: 482 PG/ML (ref 210–950)
WBC # BLD AUTO: 6.36 K/UL (ref 3.9–12.7)

## 2025-03-01 PROCEDURE — 85027 COMPLETE CBC AUTOMATED: CPT | Performed by: NURSE PRACTITIONER

## 2025-03-01 PROCEDURE — 97116 GAIT TRAINING THERAPY: CPT

## 2025-03-01 PROCEDURE — G0378 HOSPITAL OBSERVATION PER HR: HCPCS

## 2025-03-01 PROCEDURE — 80048 BASIC METABOLIC PNL TOTAL CA: CPT | Performed by: NURSE PRACTITIONER

## 2025-03-01 PROCEDURE — 97110 THERAPEUTIC EXERCISES: CPT

## 2025-03-01 PROCEDURE — 84165 PROTEIN E-PHORESIS SERUM: CPT | Mod: 26,,, | Performed by: PATHOLOGY

## 2025-03-01 PROCEDURE — 82607 VITAMIN B-12: CPT | Performed by: NURSE PRACTITIONER

## 2025-03-01 PROCEDURE — 86334 IMMUNOFIX E-PHORESIS SERUM: CPT | Mod: 26,,, | Performed by: PATHOLOGY

## 2025-03-01 PROCEDURE — 84446 ASSAY OF VITAMIN E: CPT | Performed by: NURSE PRACTITIONER

## 2025-03-01 PROCEDURE — 84207 ASSAY OF VITAMIN B-6: CPT | Performed by: NURSE PRACTITIONER

## 2025-03-01 PROCEDURE — 84165 PROTEIN E-PHORESIS SERUM: CPT | Performed by: NURSE PRACTITIONER

## 2025-03-01 PROCEDURE — 25000003 PHARM REV CODE 250: Performed by: STUDENT IN AN ORGANIZED HEALTH CARE EDUCATION/TRAINING PROGRAM

## 2025-03-01 PROCEDURE — 84425 ASSAY OF VITAMIN B-1: CPT | Performed by: NURSE PRACTITIONER

## 2025-03-01 PROCEDURE — 86334 IMMUNOFIX E-PHORESIS SERUM: CPT | Performed by: NURSE PRACTITIONER

## 2025-03-01 PROCEDURE — 96372 THER/PROPH/DIAG INJ SC/IM: CPT | Performed by: STUDENT IN AN ORGANIZED HEALTH CARE EDUCATION/TRAINING PROGRAM

## 2025-03-01 PROCEDURE — 36415 COLL VENOUS BLD VENIPUNCTURE: CPT | Performed by: NURSE PRACTITIONER

## 2025-03-01 PROCEDURE — 82746 ASSAY OF FOLIC ACID SERUM: CPT | Performed by: NURSE PRACTITIONER

## 2025-03-01 PROCEDURE — 63600175 PHARM REV CODE 636 W HCPCS: Performed by: STUDENT IN AN ORGANIZED HEALTH CARE EDUCATION/TRAINING PROGRAM

## 2025-03-01 RX ADMIN — EZETIMIBE 10 MG: 10 TABLET ORAL at 08:03

## 2025-03-01 RX ADMIN — ISOSORBIDE MONONITRATE 30 MG: 30 TABLET, EXTENDED RELEASE ORAL at 08:03

## 2025-03-01 RX ADMIN — ASPIRIN 325 MG: 325 TABLET, COATED ORAL at 08:03

## 2025-03-01 RX ADMIN — LOSARTAN POTASSIUM 50 MG: 25 TABLET, FILM COATED ORAL at 08:03

## 2025-03-01 RX ADMIN — ATORVASTATIN CALCIUM 20 MG: 10 TABLET, FILM COATED ORAL at 08:03

## 2025-03-01 RX ADMIN — METOPROLOL SUCCINATE 50 MG: 50 TABLET, EXTENDED RELEASE ORAL at 08:03

## 2025-03-01 RX ADMIN — ENOXAPARIN SODIUM 40 MG: 40 INJECTION SUBCUTANEOUS at 04:03

## 2025-03-01 NOTE — PLAN OF CARE
Problem: Infection  Goal: Absence of Infection Signs and Symptoms  Intervention: Prevent or Manage Infection  Flowsheets (Taken 2/28/2025 1834)  Infection Management: aseptic technique maintained     Problem: Adult Inpatient Plan of Care  Goal: Plan of Care Review  Flowsheets (Taken 2/28/2025 1834)  Plan of Care Reviewed With:   patient   family  Goal: Absence of Hospital-Acquired Illness or Injury  Intervention: Identify and Manage Fall Risk  Flowsheets (Taken 2/28/2025 1834)  Safety Promotion/Fall Prevention:   assistive device/personal item within reach   Fall Risk reviewed with patient/family  Intervention: Prevent Skin Injury  Flowsheets (Taken 2/28/2025 1834)  Body Position: position changed independently  Skin Protection: incontinence pads utilized  Device Skin Pressure Protection: absorbent pad utilized/changed  Intervention: Prevent and Manage VTE (Venous Thromboembolism) Risk  Flowsheets (Taken 2/28/2025 1834)  VTE Prevention/Management: ambulation promoted  Intervention: Prevent Infection  Flowsheets (Taken 2/28/2025 1834)  Infection Prevention: environmental surveillance performed  Goal: Optimal Comfort and Wellbeing  Intervention: Monitor Pain and Promote Comfort  Flowsheets (Taken 2/28/2025 1834)  Pain Management Interventions: care clustered  Intervention: Provide Person-Centered Care  Flowsheets (Taken 2/28/2025 1834)  Trust Relationship/Rapport:   care explained   questions answered     Problem: Skin Injury Risk Increased  Goal: Skin Health and Integrity  Intervention: Optimize Skin Protection  Flowsheets (Taken 2/28/2025 1834)  Skin Protection: incontinence pads utilized  Activity Management: Arm raise - L1  Head of Bed (HOB) Positioning: HOB elevated     Problem: Fall Injury Risk  Goal: Absence of Fall and Fall-Related Injury  Intervention: Identify and Manage Contributors  Flowsheets (Taken 2/28/2025 1834)  Self-Care Promotion: independence encouraged

## 2025-03-01 NOTE — PT/OT/SLP PROGRESS
Physical Therapy Treatment    Patient Name:  Tremayne Fortune   MRN:  [id number]    Recommendations:     Discharge Recommendations: Low Intensity Therapy  Discharge Equipment Recommendations: none  Barriers to discharge: None    Assessment:     Tremayne Fortune is a 89 y.o. male admitted with a medical diagnosis of Encephalopathy, metabolic.  He presents with the following impairments/functional limitations: weakness, impaired endurance, gait instability, impaired functional mobility, impaired balance, decreased safety awareness.    Pt ambulated ~50 ft /c RW and CGA using gait belt. Pt demonstrates decreased step length and required constant VC to stay close to the RW for increased safety. Pt demonstrates effortful sit><standing and required min A and increased time to complete likely secondary to BLE weakness.      Rehab Prognosis: Good; patient would benefit from acute skilled PT services to address these deficits and reach maximum level of function.    Recent Surgery: * No surgery found *      Plan:     During this hospitalization, patient to be seen 3 x/week to address the identified rehab impairments via gait training, therapeutic activities, therapeutic exercises and progress toward the following goals:    Plan of Care Expires:  03/07/25    Subjective     Chief Complaint: weakness  Patient/Family Comments/goals: Pt agreeable to therapy evaluations and treatment.   Pain/Comfort:  Pain Rating 1: 0/10      Objective:     Communicated with nurse prior to session.  Patient found supine with bed alarm, telemetry, blood pressure cuff upon PT entry to room.     General Precautions: Standard, fall  Orthopedic Precautions: N/A  Braces: N/A  Respiratory Status: Room air     Functional Mobility:  Bed Mobility:     Rolling Left:  modified independence and but requires increased time for completion  Rolling Right: modified independence and but requires increased time for completion  Scooting: modified independence and  but requires increased time for completion  Supine to Sit: minimum assistance and for trunk support and LE management   Sit to Supine: minimum assistance and  for trunk support and LE management   Transfers:     Sit to Stand:  minimum assistance and of 1 person with rolling walker and cues for hand placement   Gait: Patient ambulated 50' with rolling walker and CGA-min A using gait belt for safety as patient demonstrates unsteady gait and decreased step length.  Balance: sitting: SBA; standing: min A /c RW and gait belt.      AM-PAC 6 CLICK MOBILITY  Turning over in bed (including adjusting bedclothes, sheets and blankets)?: 3  Sitting down on and standing up from a chair with arms (e.g., wheelchair, bedside commode, etc.): 3  Moving from lying on back to sitting on the side of the bed?: 3  Moving to and from a bed to a chair (including a wheelchair)?: 3  Need to walk in hospital room?: 3  Climbing 3-5 steps with a railing?: 2  Basic Mobility Total Score: 17       Treatment & Education:  Therex: BLE and BUE AROM while seated EOB x10 reps each: shoulder flexion and abduction, seated marches, and LAQ. Pt also perform 2 reps of sit><standing    Gait: Pt ambulated ~50 ft /c RW and CGA using gait belt. Pt demonstrates decreased step length and required constant VC to stay close to the RW for increased safety. Pt demonstrates effortful sit><standing and required min A and increased time to complete likely secondary to BLE weakness.    Patient left supine with HOB elevated to eat his breakfast all lines intact, call button in reach, bed alarm on, and nurse notified..    GOALS:   Multidisciplinary Problems       Physical Therapy Goals          Problem: Physical Therapy    Goal Priority Disciplines Outcome Interventions   Physical Therapy Goal     PT, PT/OT Progressing    Description: Goals to be met by: 3/7/25     Patient will increase functional independence with mobility by performin. Pt to be (S) with bed  mobility.  2. Pt to transfer with min A.  3. Pt to ambulate 150' w/RW min/CGA.  4. Pt to tolerate x reps BLE exs seated.                         DME Justifications:  No DME recommended requiring DME justifications    Time Tracking:     PT Received On: 03/01/25  PT Start Time: 0858     PT Stop Time: 0930  PT Total Time (min): 32 min     Billable Minutes: Gait Training 17 minutes and Therapeutic Exercise 15 minutes    Treatment Type: Treatment  PT/PTA: PT     Number of PTA visits since last PT visit: 0     03/01/2025

## 2025-03-01 NOTE — PLAN OF CARE
Problem: Infection  Goal: Absence of Infection Signs and Symptoms  Outcome: Adequate for Care Transition     Problem: Adult Inpatient Plan of Care  Goal: Plan of Care Review  Outcome: Adequate for Care Transition  Goal: Patient-Specific Goal (Individualized)  Outcome: Adequate for Care Transition  Goal: Absence of Hospital-Acquired Illness or Injury  Outcome: Adequate for Care Transition  Goal: Optimal Comfort and Wellbeing  Outcome: Adequate for Care Transition  Goal: Readiness for Transition of Care  Outcome: Adequate for Care Transition     Problem: Skin Injury Risk Increased  Goal: Skin Health and Integrity  Outcome: Adequate for Care Transition     Problem: Fall Injury Risk  Goal: Absence of Fall and Fall-Related Injury  Outcome: Adequate for Care Transition     Problem: Wound  Goal: Optimal Coping  Outcome: Adequate for Care Transition  Goal: Optimal Functional Ability  Outcome: Adequate for Care Transition  Goal: Absence of Infection Signs and Symptoms  Outcome: Adequate for Care Transition  Goal: Improved Oral Intake  Outcome: Adequate for Care Transition  Goal: Optimal Pain Control and Function  Outcome: Adequate for Care Transition  Goal: Skin Health and Integrity  Outcome: Adequate for Care Transition  Goal: Optimal Wound Healing  Outcome: Adequate for Care Transition

## 2025-03-01 NOTE — PLAN OF CARE
Problem: Adult Inpatient Plan of Care  Goal: Plan of Care Review  Outcome: Progressing     Problem: Skin Injury Risk Increased  Goal: Skin Health and Integrity  Outcome: Progressing     Problem: Fall Injury Risk  Goal: Absence of Fall and Fall-Related Injury  Outcome: Progressing     Problem: Wound  Goal: Optimal Coping  Outcome: Progressing

## 2025-03-01 NOTE — PLAN OF CARE
Case Management Final Discharge Note    Discharge Disposition: home with home health ([facility] )    New DME ordered / company name: none    Relevant SDOH / Transition of Care Barriers:  none    Person available to provide assistance at home when needed and their contact information: Hortencia Fortune (Daughter)  [phone] (Mobile)     Scheduled followup appointment: PCP    Referrals placed: Neurology    Transportation: family    Patient and family educated on discharge services and updated on DC plan. Bedside RN notified, patient clear to discharge from Case Management Perspective.      03/01/25 1639   Final Note   Assessment Type Final Discharge Note   Anticipated Discharge Disposition Home-Health  ([facility] )   What phone number can be called within the next 1-3 days to see how you are doing after discharge? [phone]   Hospital Resources/Appts/Education Provided Appointments scheduled and added to AVS;Post-Acute resouces added to AVS   Post-Acute Status   Post-Acute Authorization Home Health   Home Health Status Set-up Complete/Auth obtained   Patient choice form signed by patient/caregiver List with quality metrics by geographic area provided   Discharge Delays None known at this time       I provided the patient a choice of post acute providers and offered a list of CMS rated home health providers. Patient has declined to select a preferred provider and elects placement with the first accepting in network provider that is available to provide services as ordered by the physician.

## 2025-03-01 NOTE — HOSPITAL COURSE
Admitted for acute encephalopathy. In the ED, the patient was hemodynamically stable. Labs were remarkable for normocytic anemia (13.5), mildly elevated T bili (1.1), elevated AST (46). UA was unremarkable. CT head/cervical spine showed no acute process, with moderate of degenerative changes at C5/C6. Neurology consult placed.  EEG unremarkable as well. MRI brain: 1. Noncontrast MRI demonstrates no acute intracranial abnormality.  Specifically, no evidence of acute infarct. 2. Generalized cerebral volume loss and findings suggestive of chronic microvascular ischemic change. Likely sundowning in the setting of undiagnosed at least mild dementia from being in unfamiliar environment.  Patient is back to baseline this morning. recommend obtaining CT L-spine to evaluate for the left leg weakness: 1. No fracture or malalignment. 2. Generalized osteopenia. 3. Degenerative changes of the lower lumbar spine with mild-to-moderate neural foraminal narrowing at L4-L5. 4. Additional findings above..  We will review this peripherally. blood work with vitamin B1/B6/B 9/B12/vitamin-E/MIHAI/protein electrophoresis sent. follow up in Neurology Clinic for evaluation of memory and gait imbalance.  Follow up outpatient with PCP. PT/OT recs: low intensity therapy. Wound care consulted: Remedy products to treat gluteal cleft- Cleanse with Remedy No Rinse Foaming cleanser and apply Remedy Moisture Barrier. Nursing to apply Remedy products to areas of redness in gluteal cleft every shift and prn.

## 2025-03-01 NOTE — PLAN OF CARE
[facility] Telemetry      HOME HEALTH ORDERS  FACE TO FACE ENCOUNTER    Patient Name: Tremayne Fortune  YOB: 1935    PCP: Mayo Groves MD   PCP Address: [address] / [place] [address]  PCP Phone Number: [phone]  PCP Fax: [phone]    Encounter Date: 2/27/25    Admit to Home Health    Diagnoses:  Active Hospital Problems    Diagnosis  POA    *Encephalopathy, metabolic [G93.41]  Unknown    Fall [W19.XXXA]  Unknown    Dermatitis associated with moisture [L30.8]  Yes    Peripheral vascular disease [I73.9]  Yes    Dyslipidemia [E78.5]  Yes    Coronary artery disease involving coronary bypass graft of native heart without angina pectoris [I25.810]  Yes    Essential hypertension [I10]  Yes      Resolved Hospital Problems   No resolved problems to display.       Follow Up Appointments:  Future Appointments   Date Time Provider Department Center   4/23/2025  3:20 PM Mayo Groves MD [facility] [place]       Allergies:  Review of patient's allergies indicates:   Allergen Reactions    Shellfish containing products Hives and Nausea And Vomiting              Medications: Review discharge medications with patient and family and provide education.      I have seen and examined this patient within the last 30 days. My clinical findings that support the need for the home health skilled services and home bound status are the following:no   Weakness/numbness causing balance and gait disturbance due to Weakness/Debility making it taxing to leave home.     Diet:   cardiac diet      Referrals/ Consults  Physical Therapy to evaluate and treat. Evaluate for home safety and equipment needs; Establish/upgrade home exercise program. Perform / instruct on therapeutic exercises, gait training, transfer training, and Range of Motion.  Occupational Therapy to evaluate and treat. Evaluate home environment for safety and equipment needs. Perform/Instruct on transfers, ADL training, ROM, and therapeutic  exercises.  Aide to provide assistance with personal care, ADLs, and vital signs.    Activities:   activity as tolerated    Nursing:   Agency to admit patient within 24 hours of hospital discharge unless specified on physician order or at patient request    SN to complete comprehensive assessment including routine vital signs. Instruct on disease process and s/s of complications to report to MD. Review/verify medication list sent home with the patient at time of discharge  and instruct patient/caregiver as needed. Frequency may be adjusted depending on start of care date.     Skilled nurse to perform up to 3 visits PRN for symptoms related to diagnosis    Notify MD if SBP > 160 or < 90; DBP > 90 or < 50; HR > 120 or < 50; Temp > 101; O2 < 88%; Other:       Ok to schedule additional visits based on staff availability and patient request on consecutive days within the home health episode.        Home Health Aide:  Nursing Monday, Wednesday and Friday, Physical Therapy Monday, Wednesday and Friday, Occupational Therapy Monday, Wednesday and Friday, and Home Health Aide Monday, Wednesday and Friday    Wound Care Orders  Sacral: Remedy products to treat gluteal cleft- Cleanse with Remedy No Rinse Foaming cleanser and apply Remedy Moisture Barrier  Nursing to apply Remedy products to areas of redness in gluteal cleft every shift and prn    I certify that this patient is confined to his home and needs intermittent skilled nursing care, physical therapy, and occupational therapy.

## 2025-03-01 NOTE — DISCHARGE INSTRUCTIONS
Our goal at [facility] is to always give you outstanding care and exceptional service. You may receive a survey from [employer] by mail, text or e-mail in the next 24-48 hours asking about the care you received with us. The survey should only take 5-10 minutes to complete and is very important to us.     Your feedback provides us with a way to recognize our staff who work tirelessly to provide the best care! Also, your responses help us learn how to improve when your experience was below our aspiration of excellence. We are always looking for ways to improve your stay. We WILL use your feedback to continue making improvements to help us provide the highest quality care. We keep your personal information and feedback confidential. We appreciate your time completing this survey and can't wait to hear from you!!!    We look forward to your continued care with us! Thanks so much for choosing [facility] for your healthcare needs!

## 2025-03-01 NOTE — DISCHARGE SUMMARY
[facility] Medicine  Discharge Summary      Patient Name: Tremayne Fortune  MRN: [id number]  [facility]: [id number]  Patient Class: OP- Observation  Admission Date: 2/27/2025  Hospital Length of Stay: 0 days  Discharge Date and Time:  03/01/2025 9:38 AM  Attending Physician: Tomasa Carrero MD   Discharging Provider: Troy Bone NP  Primary Care Provider: Mayo Groves MD    Primary Care Team: Networked reference to record PCT     HPI:   This is a 89-year-old male with a past medical history of CAD s/p CABG, hypertension, hyperlipidemia, peripheral artery disease, anxiety, who presents after a fall.     Patient presents for evaluation after a fall.  Reportedly, he had a ground level fall for about 8 hours and was unable to get up.  He has been having frequent falls over the last 2 days.  Patient has some short-term memory loss at baseline, but appeared more confused on the day of presentation.  Patient denies any acute symptoms, reports that he came to the hospital because of falls but is confused.    In the ED, the patient was hemodynamically stable.  Labs were remarkable for normocytic anemia (13.5), mildly elevated T bili (1.1), elevated AST (46).  UA was unremarkable.  CT head/cervical spine showed no acute process, with moderate of degenerative changes at C5/C6.  Patient was admitted for further evaluation.    * No surgery found *      Hospital Course:   Admitted for acute encephalopathy. In the ED, the patient was hemodynamically stable. Labs were remarkable for normocytic anemia (13.5), mildly elevated T bili (1.1), elevated AST (46). UA was unremarkable. CT head/cervical spine showed no acute process, with moderate of degenerative changes at C5/C6. Neurology consult placed.  EEG unremarkable as well. MRI brain: 1. Noncontrast MRI demonstrates no acute intracranial abnormality.  Specifically, no evidence of acute infarct. 2. Generalized cerebral volume loss and findings suggestive  of chronic microvascular ischemic change. Likely sundowning in the setting of undiagnosed at least mild dementia from being in unfamiliar environment.  Patient is back to baseline this morning. recommend obtaining CT L-spine to evaluate for the left leg weakness: 1. No fracture or malalignment. 2. Generalized osteopenia. 3. Degenerative changes of the lower lumbar spine with mild-to-moderate neural foraminal narrowing at L4-L5. 4. Additional findings above..  We will review this peripherally. blood work with vitamin B1/B6/B 9/B12/vitamin-E/MIHAI/protein electrophoresis sent. follow up in Neurology Clinic for evaluation of memory and gait imbalance.  Follow up outpatient with PCP. PT/OT recs: low intensity therapy. Wound care consulted: Remedy products to treat gluteal cleft- Cleanse with Remedy No Rinse Foaming cleanser and apply Remedy Moisture Barrier. Nursing to apply Remedy products to areas of redness in gluteal cleft every shift and prn.      Goals of Care Treatment Preferences:  Code Status: Full Code         Consults:   Consults (From admission, onward)          Status Ordering Provider     Inpatient Consult to Neurology Services (General Neurology)  Once        Provider:  Klever Juarez MD    Completed RADHA WALDRON              Final Active Diagnoses:    Diagnosis Date Noted POA    PRINCIPAL PROBLEM:  Encephalopathy, metabolic [G93.41] 02/28/2025 Unknown    Fall [W19.XXXA] 02/28/2025 Unknown    Dermatitis associated with moisture [L30.8] 02/28/2025 Yes    Peripheral vascular disease [I73.9] 01/12/2022 Yes    Dyslipidemia [E78.5] 01/11/2021 Yes    Coronary artery disease involving coronary bypass graft of native heart without angina pectoris [I25.810] 01/11/2021 Yes    Essential hypertension [I10] 07/07/2016 Yes      Problems Resolved During this Admission:       Discharged Condition: stable    Disposition:     Follow Up:    Patient Instructions:      Ambulatory referral/consult to Neurology  "  Standing Status: Future   Referral Priority: Routine Referral Type: Consultation   Referral Reason: Specialty Services Required   Referred to Provider: ZAK COLLIER Requested Specialty: Neurology   Number of Visits Requested: 1       Significant Diagnostic Studies: Labs: BMP:   Recent Labs   Lab 02/27/25 1943 02/28/25 0519 03/01/25 0727    100 97    138 137   K 3.8 3.6 3.7    106 106   CO2 24 20* 22*   BUN 14 13 14   CREATININE 0.8 0.7 0.7   CALCIUM 8.7 8.5* 8.3*   MG 2.1 2.1  --    , CMP   Recent Labs   Lab 02/27/25 1943 02/28/25 0519 03/01/25 0727    138 137   K 3.8 3.6 3.7    106 106   CO2 24 20* 22*    100 97   BUN 14 13 14   CREATININE 0.8 0.7 0.7   CALCIUM 8.7 8.5* 8.3*   PROT 7.3 6.7  --    ALBUMIN 3.6 3.3*  --    BILITOT 1.1* 1.4*  --    ALKPHOS 117 111  --    AST 46* 54*  --    ALT 38 36  --    ANIONGAP 10 12 9   , CBC   Recent Labs   Lab 02/27/25 1943 02/28/25 0519 03/01/25 0728   WBC 8.20 8.07 6.36   HGB 13.5* 12.9* 12.9*   HCT 39.5* 39.5* 38.6*    162 159   , INR   Lab Results   Component Value Date    INR 1.0 09/09/2009   , Lipid Panel   Lab Results   Component Value Date    CHOL 112 04/22/2024    HDL 36 (L) 04/22/2024    LDLCALC 38 04/22/2024    TRIG 189 (H) 04/22/2024    CHOLHDL 3.11 04/22/2024   , Troponin No results for input(s): "TROPONINI" in the last 168 hours., A1C: No results for input(s): "HGBA1C" in the last 4320 hours., and All labs within the past 24 hours have been reviewed    Pending Diagnostic Studies:       Procedure Component Value Units Date/Time    CT Lumbar Spine Without Contrast [[id number]] Resulted: 02/28/25 1852    Order Status: Sent Lab Status: In process Updated: 02/28/25 1901    Protein electrophoresis, serum [[id number]] Collected: 03/01/25 0728    Order Status: Sent Lab Status: No result     Specimen: Blood     Vitamin B1 [[id number]] Collected: 03/01/25 0727    Order Status: Sent Lab Status: In process Updated: " 03/01/25 0747    Specimen: Blood     Narrative:      Collection has been rescheduled by [facility] at 03/01/2025 01:55 Reason:   4:00 am labs per STARLA Vega  Collection has been rescheduled by RBBETTY at 03/01/2025 05:21 Reason:   After 730           Medications:  Reconciled Home Medications:      Medication List        CONTINUE taking these medications      acetaminophen 325 MG tablet  Commonly known as: TYLENOL  Take 1 tablet (325 mg total) by mouth every 6 (six) hours as needed for Pain.     amLODIPine 5 MG tablet  Commonly known as: NORVASC  Take 5 mg by mouth nightly.     aspirin 325 MG EC tablet  Commonly known as: ECOTRIN  Take 325 mg by mouth once daily.     atropine 1% 1 % Drop  Commonly known as: ISOPTO ATROPINE  Place 1 drop into the left eye 2 (two) times a day.     colestipoL 1 gram Tab  Commonly known as: COLESTID  1 g 2 (two) times daily.     ezetimibe 10 mg tablet  Commonly known as: ZETIA  Take 10 mg by mouth once daily.     Fish OiL 1,200 (144-216) mg Cap  Generic drug: omega 3-dha-epa-fish oil  1 capsule.     fish oil-omega-3 fatty acids 300-1,000 mg capsule  Take 1 capsule by mouth 2 (two) times a day.     isosorbide mononitrate 30 MG 24 hr tablet  Commonly known as: IMDUR  Take 30 mg by mouth once daily.     losartan 50 MG tablet  Commonly known as: COZAAR  Take 50 mg by mouth once daily.     metoprolol succinate 50 MG 24 hr tablet  Commonly known as: TOPROL-XL  50 mg 2 (two) times daily.     multivitamin capsule  Take 1 capsule by mouth once daily.     prednisoLONE acetate 1 % Drps  Commonly known as: PRED FORTE  Place 1 drop into the left eye 2 (two) times daily.     PRESERVISION AREDS-2 ORAL  Take 1 capsule by mouth 2 (two) times a day.     psyllium husk 0.4 gram Cap  Take 1 capsule by mouth 4 (four) times daily.     rosuvastatin 5 MG tablet  Commonly known as: CRESTOR  Take 5 mg by mouth every evening.              Indwelling Lines/Drains at time of discharge:   Lines/Drains/Airways       Drain   Duration             Male External Urinary Catheter 02/28/25 0341 1 day                    Time spent on the discharge of patient: greater than 45 minutes         Troy Bone NP  Department of Hospital Medicine  [facility] - Telemetry

## 2025-03-01 NOTE — SUBJECTIVE & OBJECTIVE
Interval History: patient seen and examined. NAD. Awaiting CT results     Review of Systems   Unable to perform ROS: Mental status change     Objective:     Vital Signs (Most Recent):  Temp: 98.2 °F (36.8 °C) (03/01/25 1116)  Pulse: 68 (03/01/25 1506)  Resp: 19 (03/01/25 1116)  BP: (!) 127/58 (03/01/25 1116)  SpO2: (!) 92 % (03/01/25 1116) Vital Signs (24h Range):  Temp:  [97.5 °F (36.4 °C)-98.9 °F (37.2 °C)] 98.2 °F (36.8 °C)  Pulse:  [46-74] 68  Resp:  [18-19] 19  SpO2:  [92 %-96 %] 92 %  BP: (120-142)/(58-87) 127/58     Weight: 74.2 kg (163 lb 9.3 oz)  Body mass index is 26.4 kg/m².    Intake/Output Summary (Last 24 hours) at 3/1/2025 1520  Last data filed at 3/1/2025 1518  Gross per 24 hour   Intake 360 ml   Output 1300 ml   Net -940 ml         Physical Exam  Vitals and nursing note reviewed.   Constitutional:       General: He is not in acute distress.     Appearance: He is not ill-appearing.   HENT:      Mouth/Throat:      Mouth: Mucous membranes are moist.   Cardiovascular:      Rate and Rhythm: Normal rate.   Pulmonary:      Effort: Pulmonary effort is normal.   Abdominal:      General: Abdomen is flat.   Skin:     General: Skin is warm.   Neurological:      Mental Status: He is alert.      Comments: A&Ox2.              Significant Labs: All pertinent labs within the past 24 hours have been reviewed.    Significant Imaging: I have reviewed all pertinent imaging results/findings within the past 24 hours.

## 2025-03-01 NOTE — ASSESSMENT & PLAN NOTE
- Patient presents with confusion, falls.   - Less likely related to sepsis given negative work up.   - CT head without contrast showed no acute process.   - Labs are unremarkable.   - Could be related to underlying dementia     Plan:  MRI brain 1. Noncontrast MRI demonstrates no acute intracranial abnormality.  Specifically, no evidence of acute infarct. 2. Generalized cerebral volume loss and findings suggestive of chronic microvascular ischemic change.  Neurology consult placed.  EEG unremarkable as well.  Likely sundowning in the setting of undiagnosed at least mild dementia from being in unfamiliar environment.  Patient is back to baseline this morning. recommend obtaining CT L-spine to evaluate for the left leg weakness.  We will review this peripherally. blood work with vitamin B1/B6/B 9/B12/vitamin-E/MIHAI/protein electrophoresis sent. follow up in Neurology Clinic for evaluation of memory and gait imbalance.

## 2025-03-01 NOTE — PLAN OF CARE
Problem: Infection  Goal: Absence of Infection Signs and Symptoms  Outcome: Met     Problem: Adult Inpatient Plan of Care  Goal: Plan of Care Review  Outcome: Met  Goal: Patient-Specific Goal (Individualized)  Outcome: Met  Goal: Absence of Hospital-Acquired Illness or Injury  Outcome: Met  Goal: Optimal Comfort and Wellbeing  Outcome: Met  Goal: Readiness for Transition of Care  Outcome: Met     Problem: Skin Injury Risk Increased  Goal: Skin Health and Integrity  Outcome: Met     Problem: Fall Injury Risk  Goal: Absence of Fall and Fall-Related Injury  Outcome: Met     Problem: Wound  Goal: Optimal Coping  Outcome: Met  Goal: Optimal Functional Ability  Outcome: Met  Goal: Absence of Infection Signs and Symptoms  Outcome: Met  Goal: Improved Oral Intake  Outcome: Met  Goal: Optimal Pain Control and Function  Outcome: Met  Goal: Skin Health and Integrity  Outcome: Met  Goal: Optimal Wound Healing  Outcome: Met

## 2025-03-02 NOTE — NURSING
Patient escorted by LPN to family vehicle for discharge home. Patient accompanied by spouse and family. No apparent distress noted.

## 2025-03-03 ENCOUNTER — PATIENT OUTREACH (OUTPATIENT)
Dept: ADMINISTRATIVE | Facility: CLINIC | Age: OVER 89
End: 2025-03-03
Payer: MEDICARE

## 2025-03-03 LAB
ALBUMIN SERPL ELPH-MCNC: 3.34 G/DL (ref 3.35–5.55)
ALPHA1 GLOB SERPL ELPH-MCNC: 0.33 G/DL (ref 0.17–0.41)
ALPHA2 GLOB SERPL ELPH-MCNC: 0.76 G/DL (ref 0.43–0.99)
B-GLOBULIN SERPL ELPH-MCNC: 0.78 G/DL (ref 0.5–1.1)
GAMMA GLOB SERPL ELPH-MCNC: 0.79 G/DL (ref 0.67–1.58)
INTERPRETATION SERPL IFE-IMP: NORMAL
PROT SERPL-MCNC: 6 G/DL (ref 6–8.4)

## 2025-03-03 NOTE — PROGRESS NOTES
C3 nurse spoke with Tremayne Dejon Adamsononso, patient's daughter, Hortencia for a TCC post hospital discharge follow up call. The patient's daughter, Hortencia, is in agreement with the [facility]'s scheduled [facility] appointment with [facility], Chloé Loya NP, on 03/10/2025 at 0800. Physical address and contact number confirmed.

## 2025-03-05 LAB
PATHOLOGIST INTERPRETATION IFE: NORMAL
PATHOLOGIST INTERPRETATION SPE: NORMAL

## 2025-03-06 LAB — PYRIDOXAL SERPL-MCNC: 6 UG/L (ref 5–50)

## 2025-03-07 LAB — VIT B1 BLD-MCNC: 58 UG/L (ref 38–122)

## 2025-03-12 ENCOUNTER — TELEPHONE (OUTPATIENT)
Dept: NEUROLOGY | Facility: CLINIC | Age: OVER 89
End: 2025-03-12
Payer: MEDICARE

## 2025-03-12 NOTE — TELEPHONE ENCOUNTER
----- Message from Joni sent at 3/11/2025  3:38 PM CDT -----  Type:  Sooner Apoointment RequestCaller is requesting a sooner appointment.  Caller declined first available appointment listed below.  Caller will not accept being placed on the waitlist and is requesting a message be sent to doctor.Name of Caller:pt's granddaughter Eliana Reeves is the first available appointment?04/24Symptoms:hospital f/u confusion and weaknessWould the patient rather a call back or a response via [facility]? callBest Call Back Number:[phone] Information: Pt was due to be seen last month for hosp f/u

## 2025-03-12 NOTE — TELEPHONE ENCOUNTER
----- Message from Brittany sent at 3/12/2025 12:16 PM CDT -----  Regarding: missed call  Type:  Patient Returning CallWho Called:joann/granddaughterWho Left Message for Patient:Vaibhav the patient know what this is regarding?:yesWould the patient rather a call back or a response via [facility]? callBe Call Back Number:[phone] Information:

## 2025-03-17 ENCOUNTER — OFFICE VISIT (OUTPATIENT)
Dept: HOME HEALTH SERVICES | Facility: CLINIC | Age: OVER 89
End: 2025-03-17
Payer: MEDICARE

## 2025-03-17 VITALS
TEMPERATURE: 98 F | DIASTOLIC BLOOD PRESSURE: 63 MMHG | HEART RATE: 59 BPM | OXYGEN SATURATION: 96 % | RESPIRATION RATE: 17 BRPM | SYSTOLIC BLOOD PRESSURE: 137 MMHG

## 2025-03-17 DIAGNOSIS — N39.41 URGE INCONTINENCE OF URINE: ICD-10-CM
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
## 2025-03-17 DIAGNOSIS — E78.5 DYSLIPIDEMIA: Primary | ICD-10-CM

## 2025-03-17 DIAGNOSIS — I10 ESSENTIAL HYPERTENSION: ICD-10-CM

## 2025-03-17 PROBLEM — R32 URINARY INCONTINENCE: Status: ACTIVE | Noted: 2025-03-17

## 2025-03-17 NOTE — ASSESSMENT & PLAN NOTE
BP today 137/63. Goal < 140.   - well controlled  - DASH diet   - monitor BP   - continue amlodipine, losartan, metoprolol  - F/U w/ PCP

## 2025-03-17 NOTE — ASSESSMENT & PLAN NOTE
Patient's family member reports episodes of incontinence secondary to urgency.   - monitor for s/s of UTI (dysuria, confusion, fever)   - start toileting schedule  q2 hours while awake   - F/U w/ PCP

## 2025-03-17 NOTE — ASSESSMENT & PLAN NOTE
Lab Results   Component Value Date    CHOL 112 04/22/2024    CHOL 127 02/22/2024    CHOL 131 02/04/2023     Lab Results   Component Value Date    HDL 36 (L) 04/22/2024    HDL 36 (L) 02/22/2024    HDL 41 02/04/2023     Lab Results   Component Value Date    LDLCALC 38 04/22/2024    LDLCALC Invalid, Trig>400.0 02/22/2024    LDLCALC 50.8 (L) 02/04/2023     Lab Results   Component Value Date    TRIG 189 (H) 04/22/2024    TRIG 411 (H) 02/22/2024    TRIG 196 (H) 02/04/2023       Lab Results   Component Value Date    CHOLHDL 3.11 04/22/2024    CHOLHDL 28.3 02/22/2024    CHOLHDL 31.3 02/04/2023      - continue low cholesterol, low fat diet   - continue statin therapy and zetia  - F/U w/ PCP for lipid panel monitoring

## 2025-03-17 NOTE — PROGRESS NOTES
[facility]  Transitional Care Management (TCM) Home Visit    Encounter Provider: Chloé Loya   PCP: Mayo Groves MD  Consult Requested By: No ref. provider found  Admit Date: 2/27/25   IP Discharge Date: 3/1/25  Hospital Length of Stay: 1  Days since discharge (from IP or SNF):    [facility] Contact Note: 3/3/25  Hospital Diagnosis: No admission diagnoses are documented for this encounter.     HISTORY OF PRESENT ILLNESS      Patient ID: Tremayne Fortune is a 89 y.o. male was recently admitted to the hospital, this is their TCM encounter.    Hospital Course Synopsis:    Admitted for acute encephalopathy. In the ED, the patient was hemodynamically stable. Labs were remarkable for normocytic anemia (13.5), mildly elevated T bili (1.1), elevated AST (46). UA was unremarkable. CT head/cervical spine showed no acute process, with moderate of degenerative changes at C5/C6. Neurology consult placed.  EEG unremarkable as well. MRI brain: 1. Noncontrast MRI demonstrates no acute intracranial abnormality.  Specifically, no evidence of acute infarct. 2. Generalized cerebral volume loss and findings suggestive of chronic microvascular ischemic change. Likely sundowning in the setting of undiagnosed at least mild dementia from being in unfamiliar environment.  Patient is back to baseline this morning. recommend obtaining CT L-spine to evaluate for the left leg weakness: 1. No fracture or malalignment. 2. Generalized osteopenia. 3. Degenerative changes of the lower lumbar spine with mild-to-moderate neural foraminal narrowing at L4-L5. 4. Additional findings above..  We will review this peripherally. blood work with vitamin B1/B6/B 9/B12/vitamin-E/MIHAI/protein electrophoresis sent. follow up in Neurology Clinic for evaluation of memory and gait imbalance.  Follow up outpatient with PCP. PT/OT recs: low intensity therapy. Wound care consulted: Remedy products to treat gluteal cleft- Cleanse with Remedy No  Rinse Foaming cleanser and apply Remedy Moisture Barrier. Nursing to apply Remedy products to areas of redness in gluteal cleft every shift and prn.      DECISION MAKING TODAY       Assessment & Plan:  1. Dyslipidemia  Assessment & Plan:  Lab Results   Component Value Date    CHOL 112 04/22/2024    CHOL 127 02/22/2024    CHOL 131 02/04/2023     Lab Results   Component Value Date    HDL 36 (L) 04/22/2024    HDL 36 (L) 02/22/2024    HDL 41 02/04/2023     Lab Results   Component Value Date    LDLCALC 38 04/22/2024    LDLCALC Invalid, Trig>400.0 02/22/2024    LDLCALC 50.8 (L) 02/04/2023     Lab Results   Component Value Date    TRIG 189 (H) 04/22/2024    TRIG 411 (H) 02/22/2024    TRIG 196 (H) 02/04/2023       Lab Results   Component Value Date    CHOLHDL 3.11 04/22/2024    CHOLHDL 28.3 02/22/2024    CHOLHDL 31.3 02/04/2023      - continue low cholesterol, low fat diet   - continue statin therapy and zetia  - F/U w/ PCP for lipid panel monitoring       2. Essential hypertension  Assessment & Plan:  BP today 137/63. Goal < 140.   - well controlled  - DASH diet   - monitor BP   - continue amlodipine, losartan, metoprolol  - F/U w/ PCP       3. Fall, subsequent encounter  Assessment & Plan:  - initiate fall precaution   - continue PT/OT       4. Urge incontinence of urine  Assessment & Plan:  Patient's family member reports episodes of incontinence secondary to urgency.   - monitor for s/s of UTI (dysuria, confusion, fever)   - start toileting schedule  q2 hours while awake   - F/U w/ PCP            Medication List on Discharge:     Medication List            Accurate as of March 17, 2025  6:55 PM. If you have any questions, ask your nurse or doctor.                CONTINUE taking these medications      acetaminophen 325 MG tablet  Commonly known as: TYLENOL  Take 1 tablet (325 mg total) by mouth every 6 (six) hours as needed for Pain.     amLODIPine 5 MG tablet  Commonly known as: NORVASC  Take 5 mg by mouth nightly.      aspirin 325 MG EC tablet  Commonly known as: ECOTRIN  Take 325 mg by mouth once daily.     atropine 1% 1 % Drop  Commonly known as: ISOPTO ATROPINE  Place 1 drop into the left eye 2 (two) times a day.     colestipoL 1 gram Tab  Commonly known as: COLESTID  1 g 2 (two) times daily.     ezetimibe 10 mg tablet  Commonly known as: ZETIA  Take 10 mg by mouth once daily.     Fish OiL 1,200 (144-216) mg Cap  Generic drug: omega 3-dha-epa-fish oil  1 capsule.     fish oil-omega-3 fatty acids 300-1,000 mg capsule  Take 1 capsule by mouth 2 (two) times a day.     isosorbide mononitrate 30 MG 24 hr tablet  Commonly known as: IMDUR  Take 30 mg by mouth once daily.     losartan 50 MG tablet  Commonly known as: COZAAR  Take 50 mg by mouth once daily.     metoprolol succinate 50 MG 24 hr tablet  Commonly known as: TOPROL-XL  50 mg 2 (two) times daily.     multivitamin capsule  Take 1 capsule by mouth once daily.     prednisoLONE acetate 1 % Drps  Commonly known as: PRED FORTE  Place 1 drop into the left eye 2 (two) times daily.     PRESERVISION AREDS-2 ORAL  Take 1 capsule by mouth 2 (two) times a day.     psyllium husk 0.4 gram Cap  Take 1 capsule by mouth 4 (four) times daily.     rosuvastatin 5 MG tablet  Commonly known as: CRESTOR  Take 5 mg by mouth every evening.              Medication Reconciliation:  Were medications changed on discharge? No  Were medications in the home? Yes  Is the patient taking the medications as directed? Yes  Does the patient understand the medications and changes? Yes  Does updated med list accurately reflects meds patient is currently taking? Yes    ENVIRONMENT OF CARE      Family and/or Caregiver present at visit?  Yes  Name of Caregiver:   History provided by: patient and caregiver     Advance Care Planning   Advanced Care Planning Status:  Patient has had an ACP conversation  Living Will: No  Power of : No  [place]: No    Does Caregiver have HCPoA: No  Changes today: No   Is patient  hospice appropriate: No  (If needed, use PPS <30 or FAST score >7)  Was referral to hospice placed: No       Impression upon entering the home:  Physical Dwelling: single family home   Appearance of home environment: walking pathways: cluttered  Functional Status: independent  Mobility: ambulatory with device  Nutritional access: available food but inadequate intake  Home Health: Yes,  Agency [facility]     DME/Supplies: none     Diagnostic tests reviewed/disposition: No diagnosic tests pending after this hospitalization.  Disease/illness education:  HTN  Establishment or re-establishment of referral orders for community resources: No other necessary community resources.   Discussion with other health care providers: No discussion with other health care providers necessary.   Does patient have a PCP at [facility]? Yes   Repatriation plan with PCP? follow-up with PCP within 90d   Does patient have an ostomy (ileostomy, colostomy, suprapubic catheter, nephrostomy tube, tracheostomy, PEG tube, pleurex catheter, cholecystostomy, etc)? No  Were BPAs reviewed? Yes    Social History     Socioeconomic History    Marital status:     Number of children: 2   Occupational History    Occupation: retired   Tobacco Use    Smoking status: Former     Current packs/day: 0.00     Types: Cigarettes     Quit date: 1996     Years since quittin.6     Passive exposure: Past    Smokeless tobacco: Never   Substance and Sexual Activity    Alcohol use: No    Drug use: No    Sexual activity: Not Currently     Partners: Female       OBJECTIVE:     Vital Signs:  Vitals:    25 1041   BP: 137/63   Pulse: (!) 59   Resp: 17   Temp: 97.9 °F (36.6 °C)       Review of Systems   Constitutional:  Negative for chills and fever.   HENT:  Negative for trouble swallowing.    Eyes:  Negative for visual disturbance.   Respiratory:  Negative for cough and shortness of breath.    Cardiovascular:  Negative for chest pain.   Gastrointestinal:   Negative for abdominal pain, constipation, diarrhea, nausea and vomiting.   Endocrine: Negative for cold intolerance and heat intolerance.   Genitourinary:  Positive for urgency.        Incontinence    Skin:  Negative for wound.   Allergic/Immunologic: Negative for environmental allergies.   Neurological:  Positive for weakness. Negative for dizziness and headaches.   Psychiatric/Behavioral:  Negative for hallucinations and suicidal ideas.        Physical Exam:  Physical Exam  Constitutional:       General: He is not in acute distress.     Appearance: Normal appearance.   HENT:      Head: Normocephalic and atraumatic.      Nose: No rhinorrhea.   Eyes:      General: No scleral icterus.  Cardiovascular:      Rate and Rhythm: Normal rate.   Pulmonary:      Effort: Pulmonary effort is normal. No respiratory distress.      Breath sounds: Normal breath sounds.   Abdominal:      General: Bowel sounds are normal. There is no distension.      Palpations: Abdomen is soft.      Tenderness: There is no abdominal tenderness.   Musculoskeletal:      Cervical back: No rigidity or tenderness.      Right lower leg: No edema.      Left lower leg: No edema.   Skin:     General: Skin is warm and dry.   Neurological:      Mental Status: He is alert and oriented to person, place, and time.      Motor: Weakness present.   Psychiatric:         Mood and Affect: Mood normal.         Behavior: Behavior normal.         INSTRUCTIONS FOR PATIENT:     Scheduled Follow-up, Appts Reviewed with Modifications if Needed: Yes  Future Appointments   Date Time Provider Department Center   3/18/2025 10:30 AM Tae Walters MD [facility] NEURO [facility]   4/23/2025  3:20 PM Mayo Groves MD [facility]     Take all medications as prescribed  Keep all follow-up appointments  Return to the hospital or call your primary care provider if any worsening symptoms such as fever, chest pain, shortness of breath, return of symptoms, or any other  concerns.      Signature: JILLIAN Mckee    Transition of Care Visit:  I have reviewed and updated the history and problem list.  I have reconciled the medication list.  I have discussed the hospitalization and current medical issues, prognosis and plans with the patient/family.

## 2025-03-18 ENCOUNTER — OFFICE VISIT (OUTPATIENT)
Dept: NEUROLOGY | Facility: CLINIC | Age: OVER 89
End: 2025-03-18
Payer: MEDICARE

## 2025-03-18 VITALS
TEMPERATURE: 95 F | BODY MASS INDEX: 27.25 KG/M2 | OXYGEN SATURATION: 95 % | DIASTOLIC BLOOD PRESSURE: 55 MMHG | HEART RATE: 64 BPM | SYSTOLIC BLOOD PRESSURE: 109 MMHG | WEIGHT: 169.56 LBS | HEIGHT: 66 IN

## 2025-03-18 DIAGNOSIS — R41.0 CONFUSION: ICD-10-CM

## 2025-03-18 DIAGNOSIS — G95.9 CERVICAL MYELOPATHY: ICD-10-CM

## 2025-03-18 DIAGNOSIS — F02.80 ALZHEIMER DISEASE: Primary | ICD-10-CM

## 2025-03-18 DIAGNOSIS — R53.1 WEAKNESS: ICD-10-CM

## 2025-03-18 DIAGNOSIS — G30.9 ALZHEIMER DISEASE: Primary | ICD-10-CM

## 2025-03-18 PROCEDURE — 99999 PR PBB SHADOW E&M-EST. PATIENT-LVL V: CPT | Mod: PBBFAC,,, | Performed by: PSYCHIATRY & NEUROLOGY

## 2025-03-18 NOTE — PROGRESS NOTES
Neurology Clinic Note      Date: 3/18/25  Patient Name: Tremayne Fortune   MRN: [id number]   PCP: Mayo Groves  Referring Provider: Troy Bone NP           HPI:   Mr. Tremayne Fortune is a 89 y.o. male presenting with weakness    Had a fall and was in the hospital, here at [facility].  Was found down. Had metabolic encephalopathy.  Gradually unsteady.  Lives with his wife.  More mobile than him.  Has had progressive forgetfulness, short term memory.  Was a .    In hospital, mri brain, no acute change,  elevated transaminases, mild, L lower extremity. Eeg done, reportedly unremarkable.  Labs elevated cpk, normal b12 c reactive protein elevated, no monoclonal bands on ipep ggod renal function.    A ct scan of his lumbar spine was done.   Djd, distended bladder, atrophy  lumbar paraspinous muscles.    No back pain, no neck pain.          grandaughter LUIS            PAST MEDICAL HISTORY:  Past Medical History:   Diagnosis Date    Colon cancer     Hypertension     Macular degeneration     Total retinal detachment of left eye 02/23/2021       PAST SURGICAL HISTORY:  Past Surgical History:   Procedure Laterality Date    CARDIAC SURGERY      4 vessel CABG    CATARACT EXTRACTION W/  INTRAOCULAR LENS IMPLANT Right 07/28/2016    Dr. Delgado    CATARACT EXTRACTION W/  INTRAOCULAR LENS IMPLANT Left 08/11/2016    Dr. Delgado     CHOLECYSTECTOMY      COLON SURGERY      colon resection    REPAIR OF RETINAL DETACHMENT WITH VITRECTOMY Left 02/23/2021    Procedure: REPAIR, RETINAL DETACHMENT, WITH VITRECTOMY/SCERAL BUCKLE/ LEFT EYE;  Surgeon: Alexander Shultz MD;  Location: [facility] OR [address] [place] [address];  Service: Ophthalmology;  Laterality: Left;    SCLERAL BUCKLING Left 02/23/2021    Procedure: SCLERAL BUCKLING;  Surgeon: Alexander Shultz MD;  Location: [facility] OR [address] [place] [address];  Service: Ophthalmology;  Laterality: Left;    VITRECTOMY BY PARS PLANA APPROACH Left 12/15/2020    Procedure: VITRECTOMY, PARS PLANA APPROACH,  "injection of subretinal tpa, injection of gas, left eye;  Surgeon: Alexander Shultz MD;  Location: [facility] OR [address] [place] [address];  Service: Ophthalmology;  Laterality: Left;  pt can't arrive until 9 or 10am       CURRENT MEDS:  Current Medications[1]    ALLERGIES:  Review of patient's allergies indicates:   Allergen Reactions    Shellfish containing products Hives and Nausea And Vomiting              FAMILY HISTORY:  Family History   Problem Relation Name Age of Onset    No Known Problems Mother      No Known Problems Father      No Known Problems Sister      No Known Problems Brother      No Known Problems Maternal Aunt      No Known Problems Maternal Uncle      No Known Problems Paternal Aunt      No Known Problems Paternal Uncle      No Known Problems Maternal Grandmother      No Known Problems Maternal Grandfather      No Known Problems Paternal Grandmother      No Known Problems Paternal Grandfather      Amblyopia Neg Hx      Blindness Neg Hx      Cancer Neg Hx      Cataracts Neg Hx      Diabetes Neg Hx      Glaucoma Neg Hx      Hypertension Neg Hx      Macular degeneration Neg Hx      Retinal detachment Neg Hx      Strabismus Neg Hx      Stroke Neg Hx      Thyroid disease Neg Hx         SOCIAL HISTORY:  Social History[2]    Review of Systems:  12 system review of systems is negative except for the symptoms mentioned in HPI.      Objective:     Vitals:    03/18/25 1038   BP: (!) 109/55   BP Location: Right arm   Patient Position: Sitting   Pulse: 64   Temp: (!) 94.7 °F (34.8 °C)   TempSrc: Oral   SpO2: 95%   Weight: 76.9 kg (169 lb 8.5 oz)   Height: 5' 6" (1.676 m)         General: Well-developed, well-groomed. No apparent distress  Eyes: Anicteric, noninjected.  ENT: Normocephalic, atraumatic. , count finger at 8 feet with R eye only neck fair range of motion  Respiratory: No respiratory distress.    Cardiovascular:  No carotid bruits or heart murmurs  Abdomen:  Nontender  Skin: No rashes, or lesions, nodules on exposed " areas    Neurological examination    Mental status:  Alert, not sure his age. Not sure of year of birth, but knows birth date confused about the current year, thinks its 1990, know the president's name.  Knows his address. Knows he is in the drs office.      Cranial nerves:  Cranial nerves 2-12 normal.    Motor:  Strength and tone normal.  No tremor.      Sensory:  ss pp ok in fdeet      Cerebellar:  Finger-nose-finger testing normal bilaterally.  Heel-shin testing normal bilaterally.      Reflexes:  Bilateral biceps jerks, brachioradialis reflexes, knee jerks, ankle jerks,  dull    Grasps and gegenthalten present    Toes downgoing      Gait and station:  wide based, tentative         Images:            Other Studies:    Ct's and mri reviewed, marked loss of disc height, c 5/6.          Assessment and Plan:   Tremayne Fortune is a 89 y.o. male presenting with dementia, likely alzheimer disease, gait difficulty likely gait apraxia.  Possible cervical myelopathy.    Has home health with PT, JEFRY.      Check tsh, cpk, treponema pallidum, may have had rhabdo     Mri cervical spine no contrast        Discussed the book 36 hour day, safety, supervision    No  new meds at this time    Problem List Items Addressed This Visit    None  Visit Diagnoses         Alzheimer disease    -  Primary      Weakness          Confusion        Relevant Orders    TSH w/reflex to FT4    Treponema Pallidium Antibodies IgG, IgM    CK      Cervical myelopathy        Relevant Orders    MRI Cervical Spine Without Contrast              Follow up in about 4 weeks (around 4/15/2025).        Tae Walters MD  Neurology  [facility]         [1]   Current Outpatient Medications   Medication Sig Dispense Refill    acetaminophen (TYLENOL) 325 MG tablet Take 1 tablet (325 mg total) by mouth every 6 (six) hours as needed for Pain. (Patient not taking: Reported on 3/17/2025)  0    amLODIPine (NORVASC) 5 MG tablet Take 5 mg by mouth nightly.        aspirin (ECOTRIN) 325 MG EC tablet Take 325 mg by mouth once daily.      atropine 1% (ISOPTO ATROPINE) 1 % Drop Place 1 drop into the left eye 2 (two) times a day. (Patient not taking: Reported on 3/17/2025) 5 mL 1    colestipol (COLESTID) 1 gram Tab 1 g 2 (two) times daily.  (Patient not taking: Reported on 3/17/2025)      ezetimibe (ZETIA) 10 mg tablet Take 10 mg by mouth once daily.      fish oil-omega-3 fatty acids 300-1,000 mg capsule Take 1 capsule by mouth 2 (two) times a day.       isosorbide mononitrate (IMDUR) 30 MG 24 hr tablet Take 30 mg by mouth once daily.      losartan (COZAAR) 50 MG tablet Take 50 mg by mouth once daily.  1    metoprolol succinate (TOPROL-XL) 50 MG 24 hr tablet 50 mg 2 (two) times daily.      multivitamin capsule Take 1 capsule by mouth once daily. (Patient not taking: Reported on 3/17/2025)      omega 3-dha-epa-fish oil (FISH OIL) 1,200 (144-216) mg Cap 1 capsule. (Patient not taking: Reported on 3/3/2025)      prednisoLONE acetate (PRED FORTE) 1 % DrpS Place 1 drop into the left eye 2 (two) times daily. (Patient not taking: Reported on 3/3/2025) 15 mL 1    psyllium husk 0.4 gram Cap Take 1 capsule by mouth 4 (four) times daily. (Patient not taking: Reported on 3/17/2025)      rosuvastatin (CRESTOR) 5 MG tablet Take 5 mg by mouth every evening.       vit C/E/Zn/coppr/lutein/zeaxan (PRESERVISION AREDS-2 ORAL) Take 1 capsule by mouth 2 (two) times a day.       No current facility-administered medications for this visit.     Facility-Administered Medications Ordered in Other Visits   Medication Dose Route Frequency Provider Last Rate Last Admin    atropine 1% ophthalmic solution 1 drop  1 drop Left Eye On Call Procedure Maulik Mays MD   1 drop at 12/15/20 1040    moxifloxacin 0.5 % ophthalmic solution 1 drop  1 drop Left Eye On Call Procedure Maulik Mays MD   1 drop at 12/15/20 1039    phenylephrine HCL 2.5% ophthalmic solution 1 drop  1 drop Left Eye On Call Procedure  Maulik Mays MD   1 drop at 12/15/20 1040    prednisoLONE acetate 1 % ophthalmic suspension 1 drop  1 drop Left Eye On Call Procedure Maulik Mays MD   1 drop at 12/15/20 1040    tetracaine HCl (PF) 0.5 % Drop 1 drop  1 drop Left Eye On Call Procedure Maulik Mays MD   1 drop at 12/15/20 1020    tobramycin-dexamethasone 0.3-0.1% ophthalmic ointment   Left Eye On Call Procedure Maulik Mays MD        tropicamide 1% ophthalmic solution 1 drop  1 drop Left Eye On Call Procedure Maulik Mays MD   1 drop at 12/15/20 1039   [2]   Social History  Tobacco Use    Smoking status: Former     Current packs/day: 0.00     Types: Cigarettes     Quit date: 1996     Years since quittin.6     Passive exposure: Past    Smokeless tobacco: Never   Substance Use Topics    Alcohol use: No    Drug use: No

## 2025-03-24 DIAGNOSIS — Z00.00 ENCOUNTER FOR MEDICARE ANNUAL WELLNESS EXAM: ICD-10-CM

## 2025-04-26 ENCOUNTER — EXTERNAL HOME HEALTH (OUTPATIENT)
Dept: HOME HEALTH SERVICES | Facility: HOSPITAL | Age: OVER 89
End: 2025-04-26
Payer: MEDICARE

## 2025-04-29 ENCOUNTER — DOCUMENT SCAN (OUTPATIENT)
Dept: HOME HEALTH SERVICES | Facility: HOSPITAL | Age: OVER 89
End: 2025-04-29
Payer: MEDICARE

## 2025-07-12 ENCOUNTER — EXTERNAL HOME HEALTH (OUTPATIENT)
Dept: HOME HEALTH SERVICES | Facility: HOSPITAL | Age: OVER 89
End: 2025-07-12
Payer: MEDICARE

## 2025-08-14 ENCOUNTER — TELEPHONE (OUTPATIENT)
Dept: FAMILY MEDICINE | Facility: CLINIC | Age: OVER 89
End: 2025-08-14
Payer: MEDICARE

## (undated) DEVICE — TRAY MUSCLE LID EYE

## (undated) DEVICE — SEE MEDLINE ITEM 157131

## (undated) DEVICE — PENCIL BIPOLAR 25G STR TIP

## (undated) DEVICE — SOL BETADINE 5%

## (undated) DEVICE — SOL BALANCED SALT 500ML

## (undated) DEVICE — COVER MAYO STAND REINFRCD 30

## (undated) DEVICE — KIT GREY EYE

## (undated) DEVICE — DRESSING EYE OVAL LF

## (undated) DEVICE — LENS VITRCTMY OPHTH 30DEG 59DE

## (undated) DEVICE — PACK INJ VISC FLD 20G/23G SIL

## (undated) DEVICE — PROBE ILLUM FLEX CURVE LASER

## (undated) DEVICE — DRAPE EYE

## (undated) DEVICE — SOL BSS BALANCED SALT

## (undated) DEVICE — SOL WATER STRL IRR 1000ML

## (undated) DEVICE — SHEILD & GARTERS FOX METAL EYE

## (undated) DEVICE — SOL GONAK

## (undated) DEVICE — FORCEP GRIESHABER MAXGRIP 25G

## (undated) DEVICE — KIT PERFLUOROCARBON LIQUID

## (undated) DEVICE — GOWN SURGICAL X-LARGE

## (undated) DEVICE — CORD BPLR SGL USE DISP STRL

## (undated) DEVICE — OIL SILICONE

## (undated) DEVICE — PACK AUTO GAS FILL

## (undated) DEVICE — CANNULA OPTHALMIC SOF TIP 25G

## (undated) DEVICE — CANNULA DUALBORE SIDEFLO 25G